# Patient Record
Sex: FEMALE | Race: WHITE | NOT HISPANIC OR LATINO | Employment: FULL TIME | URBAN - METROPOLITAN AREA
[De-identification: names, ages, dates, MRNs, and addresses within clinical notes are randomized per-mention and may not be internally consistent; named-entity substitution may affect disease eponyms.]

---

## 2017-01-17 ENCOUNTER — MYC MEDICAL ADVICE (OUTPATIENT)
Dept: FAMILY MEDICINE | Facility: CLINIC | Age: 47
End: 2017-01-17

## 2017-01-19 ENCOUNTER — OFFICE VISIT (OUTPATIENT)
Dept: FAMILY MEDICINE | Facility: CLINIC | Age: 47
End: 2017-01-19
Payer: COMMERCIAL

## 2017-01-19 ENCOUNTER — RADIANT APPOINTMENT (OUTPATIENT)
Dept: GENERAL RADIOLOGY | Facility: CLINIC | Age: 47
End: 2017-01-19
Attending: FAMILY MEDICINE
Payer: COMMERCIAL

## 2017-01-19 VITALS
HEIGHT: 64 IN | SYSTOLIC BLOOD PRESSURE: 107 MMHG | OXYGEN SATURATION: 97 % | WEIGHT: 226 LBS | DIASTOLIC BLOOD PRESSURE: 68 MMHG | TEMPERATURE: 97 F | HEART RATE: 94 BPM | BODY MASS INDEX: 38.58 KG/M2

## 2017-01-19 DIAGNOSIS — M25.562 LEFT KNEE PAIN, UNSPECIFIED CHRONICITY: ICD-10-CM

## 2017-01-19 DIAGNOSIS — M25.562 LEFT KNEE PAIN, UNSPECIFIED CHRONICITY: Primary | ICD-10-CM

## 2017-01-19 PROCEDURE — 73562 X-RAY EXAM OF KNEE 3: CPT | Mod: LT

## 2017-01-19 PROCEDURE — 99214 OFFICE O/P EST MOD 30 MIN: CPT | Performed by: FAMILY MEDICINE

## 2017-01-19 ASSESSMENT — PAIN SCALES - GENERAL: PAINLEVEL: SEVERE PAIN (7)

## 2017-01-19 NOTE — MR AVS SNAPSHOT
After Visit Summary   1/19/2017    Siri Vyas    MRN: [id number]           Patient Information     Date Of Birth          1970        Visit Information        Provider Department      1/19/2017 3:20 PM Chapito Coleman MD [facility]        Today's Diagnoses     Left knee pain, unspecified chronicity    -  1       Care Instructions        ================================================================================  Normal Values   Blood pressure  <140/90 for most adults    <130/80 for some chronic diseases (ask your care team about yours)    BMI (body mass index)  18.5-25 kg/m2 (based on height and weight)     Thank you for visiting [facility]    Normal or non-critical lab and imaging results will be communicated to you by MyChart, letter or phone within 7 days.  If you do not hear from us within 10 days, please call the clinic. If you have a critical or abnormal lab result, we will notify you by phone as soon as possible.     If you have any questions regarding your visit please contact:     Team Comfort:   Clinic Hours Telephone Number   Dr. Benito Perez 7am-5pm  Monday - Friday [phone]  Jodee Hung RN   Pharmacy 8:00am-8pm Monday-Friday    9am-5pm Saturday-Sunday [phone]   Urgent Care 11am-9pm Monday-Friday        9am-5pm Saturday-Sunday [phone]     After hours, weekend or if you need to make an appointment with your primary provider please call [phone].   After Hours nurse advise: call [facility]: [phone]    Medication Refills:  Call your pharmacy and they will forward the refill to us. Please allow 3 business days for your refills to be completed.                Follow-ups after your visit        Your next 10 appointments already scheduled     Jan 19, 2017  3:35 PM   XR KNEE LEFT 3 VIEWS with [id number]   [facility] ([facility])    [address] [place] [id number] [phone]           Please bring a list of your current medicines to your exam. (Include vitamins, minerals and over-thecounter medicines.) Leave your valuables at home.  Tell your doctor if there is a chance you may be pregnant.  You do not need to do anything special for this exam.              Future tests that were ordered for you today     Open Future Orders        Priority Expected Expires Ordered    MR Knee Left w/o Contrast Routine  1/19/2018 1/19/2017            Who to contact     If you have questions or need follow up information about today's clinic visit or your schedule please contact [facility] directly at [phone].  Normal or non-critical lab and imaging results will be communicated to you by MyChart, letter or phone within 4 business days after the clinic has received the results. If you do not hear from us within 7 days, please contact the clinic through [facility] or phone. If you have a critical or abnormal lab result, we will notify you by phone as soon as possible.  Submit refill requests through wali or call your pharmacy and they will forward the refill request to us. Please allow 3 business days for your refill to be completed.          Additional Information About Your Visit        [facility] Information     wali gives you secure access to your electronic health record. If you see a primary care provider, you can also send messages to your care team and make appointments. If you have questions, please call your primary care clinic.  If you do not have a primary care provider, please call [phone] and they will assist you.        Care EveryWhere ID     This is your Care EveryWhere ID. This could be used by other organizations to access your [facility] medical records  [id number]        Your Vitals Were     Pulse Temperature Height BMI (Body Mass Index) Pulse Oximetry  "Breastfeeding?    94 97  F (36.1  C) (Oral) 5' 3.5\" (1.613 m) 39.40 kg/m2 97% No       Blood Pressure from Last 3 Encounters:   01/19/17 107/68   09/15/16 112/74   03/29/16 105/70    Weight from Last 3 Encounters:   01/19/17 226 lb (102.513 kg)   09/15/16 212 lb 14.4 oz (96.571 kg)   03/29/16 203 lb 3.2 oz (92.171 kg)               Primary Care Provider Office Phone # Fax #    Chapito Coleman [phone]       [facility] [address]  [facility] [address]        Thank you!     Thank you for choosing [facility]  for your care. Our goal is always to provide you with excellent care. Hearing back from our patients is one way we can continue to improve our services. Please take a few minutes to complete the written survey that you may receive in the mail after your visit with us. Thank you!             Your Updated Medication List - Protect others around you: Learn how to safely use, store and throw away your medicines at www.disposemymeds.org.          This list is accurate as of: 1/19/17  3:32 PM.  Always use your most recent med list.                   Brand Name Dispense Instructions for use    fexofenadine 180 MG tablet    ALLEGRA    90 tablet    Take  by mouth. 1 TABLET DAILY FOR ALLERGIES       fluticasone 50 MCG/ACT spray    FLONASE    1 Package    Spray 2 sprays into both nostrils daily       MULTIVITAMINS PO          OMEGA-3 FISH OIL PO          SUMAtriptan 25 MG tablet    IMITREX    9 tablet    Take 1-2 tablets (25-50 mg) by mouth at onset of headache for migraine May repeat dose in 2 hours.  Do not exceed 200 mg in 24 hours       TYLENOL 325 MG tablet   Generic drug:  acetaminophen      Take 325-650 mg by mouth every 6 hours as needed         "

## 2017-01-19 NOTE — PATIENT INSTRUCTIONS
================================================================================  Normal Values   Blood pressure  <140/90 for most adults    <130/80 for some chronic diseases (ask your care team about yours)    BMI (body mass index)  18.5-25 kg/m2 (based on height and weight)     Thank you for visiting [facility]    Normal or non-critical lab and imaging results will be communicated to you by MyChart, letter or phone within 7 days.  If you do not hear from us within 10 days, please call the clinic. If you have a critical or abnormal lab result, we will notify you by phone as soon as possible.     If you have any questions regarding your visit please contact:     Team Comfort:   Clinic Hours Telephone Number   Dr. Benito Perez 7am-5pm  Monday - Friday [phone]  Jodee Hung RN   Pharmacy 8:00am-8pm Monday-Friday    9am-5pm Saturday-Sunday [phone]   Urgent Care 11am-9pm Monday-Friday        9am-5pm Saturday-Sunday [phone]     After hours, weekend or if you need to make an appointment with your primary provider please call [phone].   After Hours nurse advise: call [facility]: [phone]    Medication Refills:  Call your pharmacy and they will forward the refill to us. Please allow 3 business days for your refills to be completed.

## 2017-01-19 NOTE — PROGRESS NOTES
"  SUBJECTIVE:                                                    Siri Vyas is a 46 year old female who presents to clinic today for the following health issues:      Joint Pain     Onset: about 2 weeks     Description:   Location: left knee  Character: Sharp, Dull ache, Stabbing and Burning    Intensity: 7/10    Progression of Symptoms: worse    Accompanying Signs & Symptoms:  Other symptoms: swelling   History:   Previous similar pain: no       Precipitating factors:   Trauma or overuse: no     Alleviating factors:  Improved by: nothing       Therapies Tried and outcome: none      Problem list and histories reviewed & adjusted, as indicated.  Additional history: as documented    Problem list, Medication list, Allergies, and Medical/Social/Surgical histories reviewed in EPIC and updated as appropriate.    ROS:  Constitutional, HEENT, cardiovascular, pulmonary, GI, , musculoskeletal, neuro, skin, endocrine and psych systems are negative, except as otherwise noted.    OBJECTIVE:                                                    /68 mmHg  Pulse 94  Temp(Src) 97  F (36.1  C) (Oral)  Ht 5' 3.5\" (1.613 m)  Wt 226 lb (102.513 kg)  BMI 39.40 kg/m2  SpO2 97%  Breastfeeding? No  Body mass index is 39.4 kg/(m^2).  GENERAL: healthy, alert and no distress  NECK: no adenopathy, no asymmetry, masses, or scars and thyroid normal to palpation  RESP: lungs clear to auscultation - no rales, rhonchi or wheezes  CV: regular rate and rhythm, normal S1 S2, no S3 or S4, no murmur, click or rub, no peripheral edema and peripheral pulses strong  ABDOMEN: soft, nontender, no hepatosplenomegaly, no masses and bowel sounds normal  MS: medial joint line pain and posterior pain with Sherri  Diagnostic Test Results:  none      ASSESSMENT/PLAN:                                                      (M25.562) Left knee pain, unspecified chronicity  (primary encounter diagnosis)  Comment: likely cartilage injury  Plan: XR Knee " Left 3 Views, MR Knee Left w/o Contrast        Will await MRI scan results. May take ibuprofen as needed. Ice as needed.      See Patient Instructions    Chapito Coleman MD, MD  [facility]

## 2017-01-19 NOTE — NURSING NOTE
"Chief Complaint   Patient presents with     Knee Pain     Left       Initial /68 mmHg  Pulse 94  Temp(Src) 97  F (36.1  C) (Oral)  Ht 5' 3.5\" (1.613 m)  Wt 226 lb (102.513 kg)  BMI 39.40 kg/m2  SpO2 97%  Breastfeeding? No Estimated body mass index is 39.4 kg/(m^2) as calculated from the following:    Height as of this encounter: 5' 3.5\" (1.613 m).    Weight as of this encounter: 226 lb (102.513 kg).  BP completed using cuff size: suhail Vaughan MA      "

## 2017-01-21 ENCOUNTER — RADIANT APPOINTMENT (OUTPATIENT)
Dept: MRI IMAGING | Facility: CLINIC | Age: 47
End: 2017-01-21
Attending: FAMILY MEDICINE
Payer: COMMERCIAL

## 2017-01-21 DIAGNOSIS — M25.562 LEFT KNEE PAIN, UNSPECIFIED CHRONICITY: ICD-10-CM

## 2017-01-21 PROCEDURE — 73721 MRI JNT OF LWR EXTRE W/O DYE: CPT | Mod: TC

## 2017-01-23 ENCOUNTER — TELEPHONE (OUTPATIENT)
Dept: FAMILY MEDICINE | Facility: CLINIC | Age: 47
End: 2017-01-23

## 2017-01-23 NOTE — TELEPHONE ENCOUNTER
Notes Recorded by Chapito Coleman MD on 1/23/2017 at 1:06 PM  Please notify patient that there is a cartilage defect of the left knee that is likely causing her symptoms. Patient referred to Orthopedics for further evaluation and recommendations. Patient can call [phone] to schedule appointment.    Chapito Coleman MD    This writer attempted to contact Siri on 01/23/2017.    Was call answered?  No.  Left message on voicemail with information to call me back.    If patient calls back, please Contact Clinic RN team. If no one available, send encounter message    Anabell Claros

## 2017-01-24 ENCOUNTER — OFFICE VISIT (OUTPATIENT)
Dept: ORTHOPEDICS | Facility: CLINIC | Age: 47
End: 2017-01-24
Payer: COMMERCIAL

## 2017-01-24 VITALS — WEIGHT: 227 LBS | RESPIRATION RATE: 14 BRPM | BODY MASS INDEX: 37.82 KG/M2 | HEIGHT: 65 IN

## 2017-01-24 DIAGNOSIS — M94.262 CHONDROMALACIA, KNEE, LEFT: Primary | ICD-10-CM

## 2017-01-24 PROCEDURE — 99243 OFF/OP CNSLTJ NEW/EST LOW 30: CPT | Performed by: ORTHOPAEDIC SURGERY

## 2017-01-24 NOTE — MR AVS SNAPSHOT
After Visit Summary   1/24/2017    Siri Vyas    MRN: [id number]           Patient Information     Date Of Birth          1970        Visit Information        Provider Department      1/24/2017 9:15 AM Hernesto Cunningham MD [facility] Instructions    Diagnosis  Chondromalacia patella.  Glucosamine 1500 mg/day and chondroitin sulfate 1200 mg/day advised.  Avoid hopping.  Do quadriceps strengthening (especially VMO) .  Do hip abduction strengthening.  Use anti-inflammatories as needed.  Aleve up to 4 tablets per day or ibuprofen up to 12 tablets per day.                      Patellofemoral Pain Syndrome (Runner's Knee)             What is patellofemoral pain syndrome?   Patellofemoral pain syndrome is pain behind the kneecap. It may also be called patellofemoral disorder, patellar malalignment, runner's knee, and chondromalacia.   How does it occur?   Patellofemoral pain syndrome can occur from overuse of the knee in sports and activities such as running, walking, jumping, or bicycling.   The kneecap (patella) is attached to the large group of muscles in the thigh called the quadriceps. It is also attached to the shin bone by the patellar tendon. The kneecap fits into grooves in the end of the thigh bone (femur) called the femoral condyle. With repeated bending and straightening of the knee, you can irritate the inside surface of the kneecap and cause pain.   Patellofemoral pain syndrome also may result from the way your hips, legs, knees, or feet are aligned. For example, if you have wide hips or underdeveloped thigh muscles, or if you are knock-kneed You may also have this problem if your foot flattens too much when you walk or run (a condition called over-pronation).   What are the symptoms?   The main symptom is pain behind the kneecap. You may have pain when you walk, run, or sit for a long time. The pain is usually worse when you walk downhill or down  stairs. Your knee may swell at times. You may feel or hear snapping, popping, or grinding in the knee.   How is it diagnosed?   Your healthcare provider will review your symptoms and examine your knee. You will have knee X-rays. You may have an MRI to check for damage to the surface of the patella or femur or another injury.   How is it treated?   Treatment includes the following:   Put an ice pack, gel pack, or package of frozen vegetables, wrapped in a cloth on the area every 3 to 4 hours, for up to 20 minutes at a time.   Raise the knee on a pillow when you sit or lie down.   Take an anti-inflammatory medicine such as ibuprofen, or other medicine as directed by your provider. Nonsteroidal anti-inflammatory medicines (NSAIDs) may cause stomach bleeding and other problems. These risks increase with age. Read the label and take as directed. Unless recommended by your healthcare provider, do not take for more than 10 days.   Follow your provider's instructions for doing exercises to help you recover. Your healthcare provider will show you exercises to help decrease the pain behind your kneecap.   If you over-pronate, your healthcare provider may recommend shoe inserts, called orthotics. You can buy orthotics at a pharmacy or athletic shoe store or they can be custom-made.   Use an infrapatellar strap, a strap placed below the kneecap over the patellar tendon.   Wear a neoprene knee sleeve, which will give support to your knee and patella.   While you recover from your injury, you will need to change your sport or activity to one that does not make your condition worse. For example, you may need to bicycle or swim instead of run.   In cases of severe patellofemoral pain syndrome, surgery may be recommended.   How long will the effects last?   Patellofemoral pain often lasts a long time and can come back after symptoms were better for a while. Treatment requires proper rehabilitation exercises that are done regularly.    When can I return to my normal activities?   Everyone recovers from an injury at a different rate. Return to your activities depends on how soon your knee recovers, not by how many days or weeks it has been since your injury has occurred. In general, the longer you have symptoms before you start treatment, the longer it will take to get better. The goal is to return you to your normal activities as soon as is safely possible. If you return too soon you may worsen your injury.   You may safely return to your normal activities when, starting from the top of the list and progressing to the end, each of the following is true:   Your injured knee can be fully straightened and bent without pain.   Your knee and leg have regained normal strength compared to the uninjured knee and leg.   You are able to walk, bend, and squat without pain.   How can I prevent runner's knee?   Runner's knee can best be prevented by strengthening your thigh muscles, particularly the inside part of this muscle group. It is also important to wear shoes that fit well and that have good arch supports.     Published by [employer].  This content is reviewed periodically and is subject to change as new health information becomes available. The information is intended to inform and educate and is not a replacement for medical evaluation, advice, diagnosis or treatment by a healthcare professional.   Written by Rey Pinzon MD, for [employer]   ? 2010 [employer] and/or its affiliates. All Rights Reserved.   Copyright   [employer] 2011  Adult Health Advisor    Patellofemoral Pain Syndrome (Runner's Knee) Rehabilitation Exercises              You can do the hamstring stretch right away. When the pain in your knee has decreased, you can do the quadriceps stretch and start strengthening the thigh muscles using the rest of the exercises.   Standing hamstring stretch: Put the heel of the leg on your injured side on a stool about 15  inches high. Keep your leg straight. Lean forward, bending at the hips, until you feel a mild stretch in the back of your thigh. Make sure you don't roll your shoulders or bend at the waist when doing this or you will stretch your lower back instead of your leg. Hold the stretch for 15 to 30 seconds. Repeat 3 times.   Quadriceps stretch: Stand an arm's length away from the wall with your injured leg farthest from the wall. Facing straight ahead, brace yourself by keeping one hand against the wall. With your other hand, grasp the ankle of your injured leg and pull your heel toward your buttocks. Don't arch or twist your back. Keep your knees together. Hold this stretch for 15 to 30 seconds.   Side-lying leg lift: Lie on your uninjured side. Tighten the front thigh muscles on your injured leg and lift that leg 8 to 10 inches away from the other leg. Keep the leg straight and lower it slowly. Do 3 sets of 10.   Quad sets: Sit on the floor with your injured leg straight and your other leg bent. Press the back of the knee of your injured leg against the floor by tightening the muscles on the top of your thigh. Hold this position 10 seconds. Relax. Do 3 sets of 10.   Straight leg raise: Lie on your back with your legs straight out in front of you. Bend the knee on your uninjured side and place the foot flat on the floor. Tighten the thigh muscle on your injured side and lift your leg about 8 inches off the floor. Keep your leg straight and your thigh muscle tight. Slowly lower your leg back down to the floor. Do 3 sets of 10.   Step-up: Stand with the foot of your injured leg on a support 3 to 5 inches high (like a small step or block of wood). Keep your other foot flat on the floor. Shift your weight onto the injured leg on the support. Straighten your injured leg as the other leg comes off the floor. Return to the starting position by bending your injured leg and slowly lowering your uninjured leg back to the floor. Do  3 sets of 10.   Wall squat with a ball: Stand with your back, shoulders, and head against a wall. Look straight ahead. Keep your shoulders relaxed and your feet 3 feet from the wall and shoulder's width apart. Place a soccer or basketball-sized ball behind your back. Keeping your back against the wall, slowly squat down to a 45-degree angle. Your thighs will not yet be parallel to the floor. Hold this position for 10 seconds and then slowly slide back up the wall. Repeat 10 times. Build up to 3 sets of 10.   Knee stabilization: Wrap a piece of elastic tubing around the ankle of the uninjured leg. Tie a knot in the other end of the tubing and close it in a door.   0. Stand facing the door on the leg without tubing and bend your knee slightly, keeping your thigh muscles tight. While maintaining this position, move the leg with the tubing straight back behind you. Do 3 sets of 10.   0. Turn 90 degrees so the leg without tubing is closest to the door. Move the leg with tubing away from your body. Do 3 sets of 10.   0. Turn 90 degrees again so your back is to the door. Move the leg with tubing straight out in front of you. Do 3 sets of 10.   0. Turn your body 90 degrees again so the leg with tubing is closest to the door. Move the leg with tubing across your body. Do 3 sets of 10.   Hold onto a chair if you need help balancing. This exercise can be made even more challenging by standing on a pillow while you move the leg with tubing.   Resisted terminal knee extension: Make a loop from a piece of elastic tubing by tying a knot in both ends. Close both knots in a door. Step into the loop so the tubing is around the back of your injured leg. Lift the other foot off the ground. Hold onto a chair for balance, if needed. Bend the knee on the leg with tubing about 45 degrees. Slowly straighten your leg, keeping your thigh muscle tight as you do this. Do this 10 times. Do 3 sets. An easier way to do this is to stand on both  legs for better support while you do the exercise.   Standing calf stretch: Stand facing a wall with your hands on the wall at about eye level. Keep your injured leg back with your heel on the floor. Keep the other leg forward with the knee bent. Turn your back foot slightly inward (as if you were pigeon-toed). Slowly lean into the wall until you feel a stretch in the back of your calf. Hold the stretch for 15 to 30 seconds. Return to the starting position. Repeat 3 times. Do this exercise several times each day.   Clam exercise: Lie on your uninjured side with your hips and knees bent and feet together. Slowly raise your top leg toward the ceiling while keeping your heels touching each other. Hold for 2 seconds and lower slowly. Do 3 sets of 10 repetitions.   Iliotibial band stretch: Side-bending: Cross one leg in front of the other leg and lean in the opposite direction from the front leg. Reach the arm on the side of the back leg over your head while you do this. Hold this position for 15 to 30 seconds. Return to the starting position. Repeat 3 times and then switch legs and repeat the exercise.   Published by [employer].  This content is reviewed periodically and is subject to change as new health information becomes available. The information is intended to inform and educate and is not a replacement for medical evaluation, advice, diagnosis or treatment by a healthcare professional.   Written by Anu Busby MS, PT, and Candace Lopez, PT, [facility], [facility], for [employer].   ? 2010 [facility] and/or its affiliates. All Rights Reserved.   Copyright   [employer] 2011  Adult Health Advisor                                                    Follow-ups after your visit        Who to contact     If you have questions or need follow up information about today's clinic visit or your schedule please contact [facility] directly at [phone].  Normal or non-critical lab and imaging  "results will be communicated to you by MyChart, letter or phone within 4 business days after the clinic has received the results. If you do not hear from us within 7 days, please contact the clinic through [employer] or phone. If you have a critical or abnormal lab result, we will notify you by phone as soon as possible.  Submit refill requests through [employer] or call your pharmacy and they will forward the refill request to us. Please allow 3 business days for your refill to be completed.          Additional Information About Your Visit        [employer] Information     [employer] gives you secure access to your electronic health record. If you see a primary care provider, you can also send messages to your care team and make appointments. If you have questions, please call your primary care clinic.  If you do not have a primary care provider, please call [phone] and they will assist you.        Care EveryWhere ID     This is your Care EveryWhere ID. This could be used by other organizations to access your [place] medical records  [id number]        Your Vitals Were     Respirations Height BMI (Body Mass Index)             14 1.638 m (5' 4.5\") 38.38 kg/m2          Blood Pressure from Last 3 Encounters:   01/19/17 107/68   09/15/16 112/74   03/29/16 105/70    Weight from Last 3 Encounters:   01/24/17 102.967 kg (227 lb)   01/19/17 102.513 kg (226 lb)   09/15/16 96.571 kg (212 lb 14.4 oz)              Today, you had the following     No orders found for display       Primary Care Provider Office Phone # Fax #    Chapito Coleman [phone]       [facility] [address]  [facility] [address]        Thank you!     Thank you for choosing [facility]  for your care. Our goal is always to provide you with excellent care. Hearing back from our patients is one way we can continue to improve our services. Please take a few minutes to complete the written survey that you may " receive in the mail after your visit with us. Thank you!             Your Updated Medication List - Protect others around you: Learn how to safely use, store and throw away your medicines at www.disposemymeds.org.          This list is accurate as of: 1/24/17  9:48 AM.  Always use your most recent med list.                   Brand Name Dispense Instructions for use    fexofenadine 180 MG tablet    ALLEGRA    90 tablet    Take  by mouth. 1 TABLET DAILY FOR ALLERGIES       fluticasone 50 MCG/ACT spray    FLONASE    1 Package    Spray 2 sprays into both nostrils daily       MULTIVITAMINS PO          OMEGA-3 FISH OIL PO          SUMAtriptan 25 MG tablet    IMITREX    9 tablet    Take 1-2 tablets (25-50 mg) by mouth at onset of headache for migraine May repeat dose in 2 hours.  Do not exceed 200 mg in 24 hours       TYLENOL 325 MG tablet   Generic drug:  acetaminophen      Take 325-650 mg by mouth every 6 hours as needed

## 2017-01-24 NOTE — PATIENT INSTRUCTIONS
Diagnosis  Chondromalacia patella.  Glucosamine 1500 mg/day and chondroitin sulfate 1200 mg/day advised.  Avoid hopping.  Do quadriceps strengthening (especially VMO) .  Do hip abduction strengthening.  Use anti-inflammatories as needed.  Aleve up to 4 tablets per day or ibuprofen up to 12 tablets per day.                      Patellofemoral Pain Syndrome (Runner's Knee)             What is patellofemoral pain syndrome?   Patellofemoral pain syndrome is pain behind the kneecap. It may also be called patellofemoral disorder, patellar malalignment, runner's knee, and chondromalacia.   How does it occur?   Patellofemoral pain syndrome can occur from overuse of the knee in sports and activities such as running, walking, jumping, or bicycling.   The kneecap (patella) is attached to the large group of muscles in the thigh called the quadriceps. It is also attached to the shin bone by the patellar tendon. The kneecap fits into grooves in the end of the thigh bone (femur) called the femoral condyle. With repeated bending and straightening of the knee, you can irritate the inside surface of the kneecap and cause pain.   Patellofemoral pain syndrome also may result from the way your hips, legs, knees, or feet are aligned. For example, if you have wide hips or underdeveloped thigh muscles, or if you are knock-kneed You may also have this problem if your foot flattens too much when you walk or run (a condition called over-pronation).   What are the symptoms?   The main symptom is pain behind the kneecap. You may have pain when you walk, run, or sit for a long time. The pain is usually worse when you walk downhill or down stairs. Your knee may swell at times. You may feel or hear snapping, popping, or grinding in the knee.   How is it diagnosed?   Your healthcare provider will review your symptoms and examine your knee. You will have knee X-rays. You may have an MRI to check for damage to the surface of the patella or femur or  another injury.   How is it treated?   Treatment includes the following:   Put an ice pack, gel pack, or package of frozen vegetables, wrapped in a cloth on the area every 3 to 4 hours, for up to 20 minutes at a time.   Raise the knee on a pillow when you sit or lie down.   Take an anti-inflammatory medicine such as ibuprofen, or other medicine as directed by your provider. Nonsteroidal anti-inflammatory medicines (NSAIDs) may cause stomach bleeding and other problems. These risks increase with age. Read the label and take as directed. Unless recommended by your healthcare provider, do not take for more than 10 days.   Follow your provider's instructions for doing exercises to help you recover. Your healthcare provider will show you exercises to help decrease the pain behind your kneecap.   If you over-pronate, your healthcare provider may recommend shoe inserts, called orthotics. You can buy orthotics at a pharmacy or athletic shoe store or they can be custom-made.   Use an infrapatellar strap, a strap placed below the kneecap over the patellar tendon.   Wear a neoprene knee sleeve, which will give support to your knee and patella.   While you recover from your injury, you will need to change your sport or activity to one that does not make your condition worse. For example, you may need to bicycle or swim instead of run.   In cases of severe patellofemoral pain syndrome, surgery may be recommended.   How long will the effects last?   Patellofemoral pain often lasts a long time and can come back after symptoms were better for a while. Treatment requires proper rehabilitation exercises that are done regularly.   When can I return to my normal activities?   Everyone recovers from an injury at a different rate. Return to your activities depends on how soon your knee recovers, not by how many days or weeks it has been since your injury has occurred. In general, the longer you have symptoms before you start treatment,  the longer it will take to get better. The goal is to return you to your normal activities as soon as is safely possible. If you return too soon you may worsen your injury.   You may safely return to your normal activities when, starting from the top of the list and progressing to the end, each of the following is true:   Your injured knee can be fully straightened and bent without pain.   Your knee and leg have regained normal strength compared to the uninjured knee and leg.   You are able to walk, bend, and squat without pain.   How can I prevent runner's knee?   Runner's knee can best be prevented by strengthening your thigh muscles, particularly the inside part of this muscle group. It is also important to wear shoes that fit well and that have good arch supports.     Published by [employer].  This content is reviewed periodically and is subject to change as new health information becomes available. The information is intended to inform and educate and is not a replacement for medical evaluation, advice, diagnosis or treatment by a healthcare professional.   Written by Rey Pinzon MD, for [employer]   ? 2010 [employer] and/or its affiliates. All Rights Reserved.   Copyright   [employer] 2011  Adult Health Advisor    Patellofemoral Pain Syndrome (Runner's Knee) Rehabilitation Exercises              You can do the hamstring stretch right away. When the pain in your knee has decreased, you can do the quadriceps stretch and start strengthening the thigh muscles using the rest of the exercises.   Standing hamstring stretch: Put the heel of the leg on your injured side on a stool about 15 inches high. Keep your leg straight. Lean forward, bending at the hips, until you feel a mild stretch in the back of your thigh. Make sure you don't roll your shoulders or bend at the waist when doing this or you will stretch your lower back instead of your leg. Hold the stretch for 15 to 30 seconds. Repeat 3  times.   Quadriceps stretch: Stand an arm's length away from the wall with your injured leg farthest from the wall. Facing straight ahead, brace yourself by keeping one hand against the wall. With your other hand, grasp the ankle of your injured leg and pull your heel toward your buttocks. Don't arch or twist your back. Keep your knees together. Hold this stretch for 15 to 30 seconds.   Side-lying leg lift: Lie on your uninjured side. Tighten the front thigh muscles on your injured leg and lift that leg 8 to 10 inches away from the other leg. Keep the leg straight and lower it slowly. Do 3 sets of 10.   Quad sets: Sit on the floor with your injured leg straight and your other leg bent. Press the back of the knee of your injured leg against the floor by tightening the muscles on the top of your thigh. Hold this position 10 seconds. Relax. Do 3 sets of 10.   Straight leg raise: Lie on your back with your legs straight out in front of you. Bend the knee on your uninjured side and place the foot flat on the floor. Tighten the thigh muscle on your injured side and lift your leg about 8 inches off the floor. Keep your leg straight and your thigh muscle tight. Slowly lower your leg back down to the floor. Do 3 sets of 10.   Step-up: Stand with the foot of your injured leg on a support 3 to 5 inches high (like a small step or block of wood). Keep your other foot flat on the floor. Shift your weight onto the injured leg on the support. Straighten your injured leg as the other leg comes off the floor. Return to the starting position by bending your injured leg and slowly lowering your uninjured leg back to the floor. Do 3 sets of 10.   Wall squat with a ball: Stand with your back, shoulders, and head against a wall. Look straight ahead. Keep your shoulders relaxed and your feet 3 feet from the wall and shoulder's width apart. Place a soccer or basketball-sized ball behind your back. Keeping your back against the wall, slowly  squat down to a 45-degree angle. Your thighs will not yet be parallel to the floor. Hold this position for 10 seconds and then slowly slide back up the wall. Repeat 10 times. Build up to 3 sets of 10.   Knee stabilization: Wrap a piece of elastic tubing around the ankle of the uninjured leg. Tie a knot in the other end of the tubing and close it in a door.   0. Stand facing the door on the leg without tubing and bend your knee slightly, keeping your thigh muscles tight. While maintaining this position, move the leg with the tubing straight back behind you. Do 3 sets of 10.   0. Turn 90 degrees so the leg without tubing is closest to the door. Move the leg with tubing away from your body. Do 3 sets of 10.   0. Turn 90 degrees again so your back is to the door. Move the leg with tubing straight out in front of you. Do 3 sets of 10.   0. Turn your body 90 degrees again so the leg with tubing is closest to the door. Move the leg with tubing across your body. Do 3 sets of 10.   Hold onto a chair if you need help balancing. This exercise can be made even more challenging by standing on a pillow while you move the leg with tubing.   Resisted terminal knee extension: Make a loop from a piece of elastic tubing by tying a knot in both ends. Close both knots in a door. Step into the loop so the tubing is around the back of your injured leg. Lift the other foot off the ground. Hold onto a chair for balance, if needed. Bend the knee on the leg with tubing about 45 degrees. Slowly straighten your leg, keeping your thigh muscle tight as you do this. Do this 10 times. Do 3 sets. An easier way to do this is to stand on both legs for better support while you do the exercise.   Standing calf stretch: Stand facing a wall with your hands on the wall at about eye level. Keep your injured leg back with your heel on the floor. Keep the other leg forward with the knee bent. Turn your back foot slightly inward (as if you were pigeon-toed).  Slowly lean into the wall until you feel a stretch in the back of your calf. Hold the stretch for 15 to 30 seconds. Return to the starting position. Repeat 3 times. Do this exercise several times each day.   Clam exercise: Lie on your uninjured side with your hips and knees bent and feet together. Slowly raise your top leg toward the ceiling while keeping your heels touching each other. Hold for 2 seconds and lower slowly. Do 3 sets of 10 repetitions.   Iliotibial band stretch: Side-bending: Cross one leg in front of the other leg and lean in the opposite direction from the front leg. Reach the arm on the side of the back leg over your head while you do this. Hold this position for 15 to 30 seconds. Return to the starting position. Repeat 3 times and then switch legs and repeat the exercise.   Published by [employer].  This content is reviewed periodically and is subject to change as new health information becomes available. The information is intended to inform and educate and is not a replacement for medical evaluation, advice, diagnosis or treatment by a healthcare professional.   Written by Anu Busby, MS, PT, and Candace Lopez PT, [facility], [facility], for [employer].   ? 2010 [facility] and/or its affiliates. All Rights Reserved.   Copyright   [employer] Reference Systems 2011  Adult Health Advisor

## 2017-01-24 NOTE — NURSING NOTE
"Chief Complaint   Patient presents with     Consult     Left knee pain, referred by Dr. Coleman. Xray left knee 1/19/17, MRI 1/24/17.        Initial Resp 14  Ht 1.638 m (5' 4.5\")  Wt 102.967 kg (227 lb)  BMI 38.38 kg/m2 Estimated body mass index is 38.38 kg/(m^2) as calculated from the following:    Height as of this encounter: 1.638 m (5' 4.5\").    Weight as of this encounter: 102.967 kg (227 lb).  BP completed using cuff size: NA (Not Taken)    Chago Siegel PA-C  Supervising physician: Hernesto Cunningham MD  Dept. of Orthopedics  [facility]          "

## 2017-01-26 PROBLEM — M94.262 CHONDROMALACIA, KNEE, LEFT: Status: ACTIVE | Noted: 2017-01-26

## 2017-01-26 NOTE — PROGRESS NOTES
Siri Vyas is a 46 year old female who is seen in consultation at the request of Dr. Chapito Coleman  for left knee pain.    Past Medical History   Diagnosis Date     Anxiety disorder      High cholesterol 10/2005     Depression with anxiety 10/2006     PE (pulmonary embolism) 2007     AR (allergic rhinitis)        Past Surgical History   Procedure Laterality Date     Tonsillectomy  1999     Laparoscopic cholecystectomy       Sinus surgery  2002     Nasls sinuses     Gyn surgery  ,,,           Dermoid cyst  (ovary) removal         Family History   Problem Relation Age of Onset     Breast Cancer Mother      Lipids Mother      Hypertension Mother      Lipids Brother      CEREBROVASCULAR DISEASE Maternal Grandmother 76     Neurologic Disorder Son 14     migraines     Hearing Loss Mother        Social History     Social History     Marital Status:      Spouse Name: Jose Armando     Number of Children: 4     Years of Education: 16     Occupational History     [employer]     Social History Main Topics     Smoking status: Never Smoker      Smokeless tobacco: Never Used      Comment: no second hand smoke     Alcohol Use: Yes      Comment: 1-2 days in a week. Each time 1-2 beers. 0-5 beers weeks     Drug Use: No     Sexual Activity:     Partners: Male     Other Topics Concern     Not on file     Social History Narrative       Current Outpatient Prescriptions   Medication Sig Dispense Refill     Omega-3 Fatty Acids (OMEGA-3 FISH OIL PO)        Multiple Vitamin (MULTIVITAMINS PO)        SUMAtriptan (IMITREX) 25 MG tablet Take 1-2 tablets (25-50 mg) by mouth at onset of headache for migraine May repeat dose in 2 hours.  Do not exceed 200 mg in 24 hours 9 tablet 5     fluticasone (FLONASE) 50 MCG/ACT nasal spray Spray 2 sprays into both nostrils daily 1 Package 1     acetaminophen (TYLENOL) 325 MG tablet Take 325-650 mg by mouth every 6 hours as needed        "fexofenadine (ALLEGRA) 180 MG tablet Take  by mouth. 1 TABLET DAILY FOR ALLERGIES 90 tablet 0       Allergies   Allergen Reactions     No Known Drug Allergy      Seasonal Allergies        REVIEW OF SYSTEMS:  CONSTITUTIONAL:  NEGATIVE for fever, chills, change in weight, not feeling tired  SKIN:  NEGATIVE for worrisome rashes, no skin lumps, no skin ulcers and no non-healing wounds  EYES:  NEGATIVE for vision changes or irritation.  ENT/MOUTH:  NEGATIVE.  No hearing loss, no hoarseness, no difficulty swallowing.  RESP:  NEGATIVE. No cough or shortness of breath.  CV:  NEGATIVE for chest pain, palpitations or peripheral edema  GI:  NEGATIVE for nausea, abdominal pain, heartburn, or change in bowel habits  :  Negative. No dysuria, no hematuria  MUSCULOSKELETAL:  See HPI above  NEURO:  NEGATIVE . No headaches, no dizziness,  no numbness  ENDOCRINE:  NEGATIVE for temperature intolerance, skin/hair changes  HEME/ALLERGY/IMMUNE:  NEGATIVE for bleeding problems  PSYCHIATRIC:  NEGATIVE. no anxiety, no depression.      Exam:  Vitals: Resp 14  Ht 1.638 m (5' 4.5\")  Wt 102.967 kg (227 lb)  BMI 38.38 kg/m2  BMI= Body mass index is 38.38 kg/(m^2).  Constitutional:  healthy, alert and no distress  Neuro: Alert and Oriented x 3, Gait normal. Sensation grossly WNL.  HEENT:  Atraumatic, EOMI  Neck:  Neck supple with no tenderness.  Psych: Affect normal   Respiratory: Breathing not labored.  Cardiovascular: normal peripheral pulses  Lymph: no adenopathy  Skin: No rashes,worrisome lesions or skin problems  Spine: straight, no straight leg raising pain.  Hips show full range of motion.  There is no tenderness over the sacro-iliac joints, sciatic notch, or greater trochanters.     "

## 2017-01-26 NOTE — PROGRESS NOTES
DATE OF VISIT:  2017       HISTORY OF PRESENT ILLNESS:  Ms. Daiana Figueroa is a 46-year-old female referred from Dr. Chapito Coleman for evaluation of left knee pain.  This started in 2016 and has gotten progressively worse since then.  It bothers her with stairs, bending and running.  She has a clicking in the knee.  She has sharp, aching, shooting pain primarily in the anterior portion of the knee, rates this at 6/10.  She has had MRI scan showing cartilage thinning and a small 1 cm defect in the articular cartilage in the weightbearing surface of the medial femoral condyle.  Menisci were normal.      PHYSICAL EXAMINATION:  Shows a trace effusion of the left knee. She has full mobility of the knees.  She does have clicking under the patella on the left.  She has tenderness around the medial joint and anteromedial joint on the left.  There is also some tenderness laterally over the femur, possibly related to IT band.  She has no ligamentous laxity of MCL, LCL or cruciates.  She had anteromedial pain with lateral Sherri.  She had negative medial Sherri, indicating this is primarily inflammation.  Sensation and circulation were intact to the legs.  There is no increased warmth or erythema.      IMPRESSION:  Left knee chondromalacia.  We discussed options and will recommend glucosamine and chondroitin sulfate, avoid hopping or jumping.  Rhythmic activities were recommended Aleve up to 4 tablets per day, quad and hip abduction strengthening were described.  Return to clinic p.kim ISLAS MD             D: 2017 07:43   T: 2017 08:09   MT: mala      Name:     DAIANA FIGUEROA   MRN:      [id number]        Account:      [id number]   :      1970           Visit Date:   2017      Document: [id number]       cc: Chapito Coleman MD

## 2017-02-22 ENCOUNTER — OFFICE VISIT (OUTPATIENT)
Dept: FAMILY MEDICINE | Facility: CLINIC | Age: 47
End: 2017-02-22
Payer: COMMERCIAL

## 2017-02-22 VITALS
BODY MASS INDEX: 35.65 KG/M2 | WEIGHT: 214 LBS | HEIGHT: 65 IN | OXYGEN SATURATION: 94 % | TEMPERATURE: 99.5 F | DIASTOLIC BLOOD PRESSURE: 81 MMHG | SYSTOLIC BLOOD PRESSURE: 131 MMHG | HEART RATE: 110 BPM

## 2017-02-22 DIAGNOSIS — R07.0 THROAT PAIN: ICD-10-CM

## 2017-02-22 DIAGNOSIS — Z13.6 CARDIOVASCULAR SCREENING; LDL GOAL LESS THAN 160: ICD-10-CM

## 2017-02-22 DIAGNOSIS — E66.812 OBESITY, CLASS II, BMI 35-39.9: ICD-10-CM

## 2017-02-22 DIAGNOSIS — J06.9 VIRAL UPPER RESPIRATORY ILLNESS: Primary | ICD-10-CM

## 2017-02-22 LAB
DEPRECATED S PYO AG THROAT QL EIA: NORMAL
FLUAV+FLUBV AG SPEC QL: NEGATIVE
FLUAV+FLUBV AG SPEC QL: NORMAL
MICRO REPORT STATUS: NORMAL
SPECIMEN SOURCE: NORMAL
SPECIMEN SOURCE: NORMAL

## 2017-02-22 PROCEDURE — 99213 OFFICE O/P EST LOW 20 MIN: CPT | Performed by: NURSE PRACTITIONER

## 2017-02-22 PROCEDURE — 87081 CULTURE SCREEN ONLY: CPT | Performed by: NURSE PRACTITIONER

## 2017-02-22 PROCEDURE — 87804 INFLUENZA ASSAY W/OPTIC: CPT | Performed by: NURSE PRACTITIONER

## 2017-02-22 PROCEDURE — 87880 STREP A ASSAY W/OPTIC: CPT | Performed by: NURSE PRACTITIONER

## 2017-02-22 RX ORDER — OSELTAMIVIR PHOSPHATE 75 MG/1
75 CAPSULE ORAL 2 TIMES DAILY
Qty: 10 CAPSULE | Refills: 0 | Status: SHIPPED | OUTPATIENT
Start: 2017-02-22 | End: 2017-04-24

## 2017-02-22 NOTE — NURSING NOTE
"Chief Complaint   Patient presents with     URI       Initial /81  Pulse 110  Temp 99.5  F (37.5  C) (Oral)  Ht 5' 4.5\" (1.638 m)  Wt 214 lb (97.1 kg)  LMP 02/06/2017  SpO2 94%  BMI 36.17 kg/m2 Estimated body mass index is 36.17 kg/(m^2) as calculated from the following:    Height as of this encounter: 5' 4.5\" (1.638 m).    Weight as of this encounter: 214 lb (97.1 kg).  Medication Reconciliation: complete       Concepcion Taylor MA    "

## 2017-02-22 NOTE — PATIENT INSTRUCTIONS
"  Viral Syndrome (Adult)  A viral illness may cause a number of symptoms. The symptoms depend on the part of the body that the virus affects. If it settles in the nose, throat, and lungs, it may cause cough, sore throat, congestion, and sometimes headache. If it settles in the stomach and intestinal tract, it may cause vomiting and diarrhea. Sometimes it causes vague symptoms like \"aching all over,\" feeling tired, loss of appetite, or fever.  A viral illness usually lasts 1 to 2 weeks, but sometimes it lasts longer. In some cases, a more serious infection can look like a viral syndrome in the first few days of the illness. You may need another exam and additional tests to know the difference. Watch for the warning signs listed below.  Home care  Follow these guidelines for taking care of yourself at home:    If symptoms are severe, rest at home for the first 2 to 3 days.    Stay away from cigarette smoke - both your smoke and the smoke from others.    You may use over-the-counter medication acetaminophen or ibuprofen for fever, muscle aching, and headache, unless another medicine was prescribed for this. If you have chronic liver or kidney disease or ever had a stomach ulcer or GI bleeding, talk with your doctor before using these medicines . No one who is younger than 18 and ill with a fever should take aspirin. It may cause severe disease or death liver damage .    Your appetite may be poor, so a light diet is fine. Avoid dehydration by drinking 8 to 12 8-ounce glasses of fluids each day. This may include water; orange juice; lemonade; apple, grape, and cranberry juice; clear fruit drinks; electrolyte replacement and sports drinks; and decaffeinated teas and coffee. If you have been diagnosed with a kidney disease, ask your doctor how much and what types of fluids you should drink to prevent dehydration. If you have kidney disease, drinking too much fluid can cause it build up in the your body and be dangerous to " your health.    Over-the-counter remedies won't shorten the length of the illness but may be helpful for cough, sore throat; and nasal and sinus congestion. Don't use decongestants if you have high blood pressure.  Follow-up care  Follow up with your health care provider if you do not improve over the next week.  When to seek medical advice  Call your healthcare provider right away if any of these occur:    Cough with lots of colored sputum (mucus) or blood in your sputum    Chest pain, shortness of breath, wheezing, or difficulty breathing    Severe headache; face, neck, or ear pain    Severe, constant pain in the lower right side of your belly (abdominal)    Continued vomiting (can t keep liquids down)    Frequent diarrhea (more than 5 times a day); blood (red or black color) or mucus in diarrhea    Feeling weak, dizzy, or like you are going to faint    Extreme thirst    Fever of 100.4 F (38 C) or higher, or as directed by your healthcare provider  Call 911  Get emergency medical care if any of the following occur:    Convulsion    Feeling weak, dizzy, or like you are going to faint    Chest pain, shortness of breath, wheezing, or difficulty breathing    [id number] [employer]. [address] [place] [address], [place] [address]. All rights reserved. This information is not intended as a substitute for professional medical care. Always follow your healthcare professional's instructions.

## 2017-02-22 NOTE — PROGRESS NOTES
"  SUBJECTIVE:                                                    Siri Vyas is a 46 year old female who presents to clinic today for the following health issues:      Acute Illness   Acute illness concerns: URI  Onset: 3 days ago    Fever: YES- 102.5 T max    Chills/Sweats: YES    Headache (location?): YES    Sinus Pressure:YES    Conjunctivitis: NO    Ear Pain: YES: both    Rhinorrhea: NO    Congestion: YES    Sore Throat: YES     Cough: YES-nonproductive    Wheeze: YES    Decreased Appetite: YES    Nausea: no     Vomiting: no     Diarrhea:  no     Dysuria/Freq.: no     Fatigue/Achiness: YES    Sick/Strep Exposure: no      Therapies Tried and outcome: Tylenol to break fevers       Problem list and histories reviewed & adjusted, as indicated.  Additional history: as documented    Labs reviewed in EPIC  Problem list, Medication list, Allergies, and Medical/Social/Surgical histories reviewed in [facility] and updated as appropriate.    ROS:  Constitutional, HEENT, cardiovascular, pulmonary, gi and gu systems are negative, except as otherwise noted.    OBJECTIVE:                                                    /81  Pulse 110  Temp 99.5  F (37.5  C) (Oral)  Ht 5' 4.5\" (1.638 m)  Wt 214 lb (97.1 kg)  LMP 02/06/2017  SpO2 94%  BMI 36.17 kg/m2  Body mass index is 36.17 kg/(m^2).  GENERAL: healthy, alert and no distress  EYES: Eyes grossly normal to inspection, PERRL and conjunctivae and sclerae normal  HENT: ear canals and TM's normal, nose and mouth without ulcers or lesions, clear rhinorrhea  NECK: no adenopathy, no asymmetry, masses, or scars and thyroid normal to palpation  RESP:Loose, nonproductive cough, lungs clear to auscultation - no rales, rhonchi or wheezes  CV: regular rate and rhythm, normal S1 S2, no S3 or S4, no murmur, click or rub, no peripheral edema and peripheral pulses strong  ABDOMEN: soft, nontender, no hepatosplenomegaly, no masses and bowel sounds normal  MS: no gross musculoskeletal defects " "noted, no edema  SKIN: no suspicious lesions or rashes  PSYCH: mentation appears normal, affect normal/bright    Diagnostic Test Results:  No results found for this or any previous visit (from the past 24 hour(s)).     ASSESSMENT/PLAN:                                                        BP Screening:   Last 3 BP Readings:    BP Readings from Last 3 Encounters:   02/22/17 131/81   01/19/17 107/68   09/15/16 112/74       The following was recommended to the patient:  Re-screen BP within a year and recommended lifestyle modifications  BMI:   Estimated body mass index is 36.17 kg/(m^2) as calculated from the following:    Height as of this encounter: 5' 4.5\" (1.638 m).    Weight as of this encounter: 214 lb (97.1 kg).   Weight management plan: Discussed healthy diet and exercise guidelines and patient will follow up in 6 months in clinic to re-evaluate.      1. Viral upper respiratory illness  Patient looks ill- am treating with Tamiflu despite negative Rapid  Influenza A/B testing. We discussed the pathophysiology of influenza and viral etiology,reviewed the risks and benefits of various over the counter and prescription medications.  Additionally, we reviewed the infectious nature of this condition and techniques to minimize transmission and future infections.Wediscussed warning signs of worsening infection, if she deviates from the expected course, she will contact us.  I also discussed signs and symptoms of the flu, if other household contacts develop these symptoms, they are to come into the clinic immediately, as anti-viral medications need to be started within the first 48 hours.       - Influenza A/B antigen  - oseltamivir (TAMIFLU) 75 MG capsule; Take 1 capsule (75 mg) by mouth 2 times daily  Dispense: 10 capsule; Refill: 0    2. Throat pain    RST negative, await TC result.  Supportive measures for now- fluids, rest, humidified air, tylenol/Ibuprofen prn fever, body aches, hand washing reviewed, avoid " shared eating/drinking utensils.    - Rapid strep screen  - Beta strep group A culture    3. CARDIOVASCULAR SCREENING; LDL GOAL LESS THAN 160    - Lipid Profile (Chol, Trig, HDL, LDL calc); Future    4. Obesity, Class II, BMI 35-39.9 (H)  Benefits of weight loss reviewed in detail, encouraged her to cut back on the carbohydrates in the diet, consume more fruits and vegetables, drink plenty of water, avoid fruit juices, sodas, get 150 min moderate exercise/week.  Recheck weight in 6 months.        See Patient Instructions    [facility]

## 2017-02-22 NOTE — LETTER
[address] [address]  [phone]    February 22, 2017        Siri Vyas  [address]  [facility] [address]          To whom it may concern:    This patient missed work 2/22/2017 due to a clinic visit.  She can return to work when she is afebrile, likely not before Monday 2/27/17.    Please contact me for questions or concerns.        Sincerely,        Ines DEL RIO, CNP

## 2017-02-22 NOTE — MR AVS SNAPSHOT
"              After Visit Summary   2/22/2017    Siri Vyas    MRN: [id number]           Patient Information     Date Of Birth          1970        Visit Information        Provider Department      2/22/2017 1:20 PM Ines Mayen APRN CNP [facility] [address] [facility]        Today's Diagnoses     Viral upper respiratory illness    -  1    Throat pain        CARDIOVASCULAR SCREENING; LDL GOAL LESS THAN 160          Care Instructions      Viral Syndrome (Adult)  A viral illness may cause a number of symptoms. The symptoms depend on the part of the body that the virus affects. If it settles in the nose, throat, and lungs, it may cause cough, sore throat, congestion, and sometimes headache. If it settles in the stomach and intestinal tract, it may cause vomiting and diarrhea. Sometimes it causes vague symptoms like \"aching all over,\" feeling tired, loss of appetite, or fever.  A viral illness usually lasts 1 to 2 weeks, but sometimes it lasts longer. In some cases, a more serious infection can look like a viral syndrome in the first few days of the illness. You may need another exam and additional tests to know the difference. Watch for the warning signs listed below.  Home care  Follow these guidelines for taking care of yourself at home:    If symptoms are severe, rest at home for the first 2 to 3 days.    Stay away from cigarette smoke - both your smoke and the smoke from others.    You may use over-the-counter medication acetaminophen or ibuprofen for fever, muscle aching, and headache, unless another medicine was prescribed for this. If you have chronic liver or kidney disease or ever had a stomach ulcer or GI bleeding, talk with your doctor before using these medicines . No one who is younger than 18 and ill with a fever should take aspirin. It may cause severe disease or death liver damage .    Your appetite may be poor, so a light diet is fine. Avoid dehydration by drinking 8 to 12 8-ounce " glasses of fluids each day. This may include water; orange juice; lemonade; apple, grape, and cranberry juice; clear fruit drinks; electrolyte replacement and sports drinks; and decaffeinated teas and coffee. If you have been diagnosed with a kidney disease, ask your doctor how much and what types of fluids you should drink to prevent dehydration. If you have kidney disease, drinking too much fluid can cause it build up in the your body and be dangerous to your health.    Over-the-counter remedies won't shorten the length of the illness but may be helpful for cough, sore throat; and nasal and sinus congestion. Don't use decongestants if you have high blood pressure.  Follow-up care  Follow up with your health care provider if you do not improve over the next week.  When to seek medical advice  Call your healthcare provider right away if any of these occur:    Cough with lots of colored sputum (mucus) or blood in your sputum    Chest pain, shortness of breath, wheezing, or difficulty breathing    Severe headache; face, neck, or ear pain    Severe, constant pain in the lower right side of your belly (abdominal)    Continued vomiting (can t keep liquids down)    Frequent diarrhea (more than 5 times a day); blood (red or black color) or mucus in diarrhea    Feeling weak, dizzy, or like you are going to faint    Extreme thirst    Fever of 100.4 F (38 C) or higher, or as directed by your healthcare provider  Call 911  Get emergency medical care if any of the following occur:    Convulsion    Feeling weak, dizzy, or like you are going to faint    Chest pain, shortness of breath, wheezing, or difficulty breathing    9430-1587 [employer]. [address] [place] [address], [place] [address]. All rights reserved. This information is not intended as a substitute for professional medical care. Always follow your healthcare professional's instructions.              Follow-ups after your visit        Future tests that were  "ordered for you today     Open Future Orders        Priority Expected Expires Ordered    Lipid Profile (Chol, Trig, HDL, LDL calc) Routine  2/22/2018 2/22/2017            Who to contact     If you have questions or need follow up information about today's clinic visit or your schedule please contact [facility] directly at [phone].  Normal or non-critical lab and imaging results will be communicated to you by MyChart, letter or phone within 4 business days after the clinic has received the results. If you do not hear from us within 7 days, please contact the clinic through [employer] or phone. If you have a critical or abnormal lab result, we will notify you by phone as soon as possible.  Submit refill requests through [employer] or call your pharmacy and they will forward the refill request to us. Please allow 3 business days for your refill to be completed.          Additional Information About Your Visit        [employer] Information     [employer] gives you secure access to your electronic health record. If you see a primary care provider, you can also send messages to your care team and make appointments. If you have questions, please call your primary care clinic.  If you do not have a primary care provider, please call [phone] and they will assist you.        Care EveryWhere ID     This is your Care EveryWhere ID. This could be used by other organizations to access your [facility] medical records  [id number]        Your Vitals Were     Pulse Temperature Height Last Period Pulse Oximetry BMI (Body Mass Index)    110 99.5  F (37.5  C) (Oral) 5' 4.5\" (1.638 m) 02/06/2017 94% 36.17 kg/m2       Blood Pressure from Last 3 Encounters:   02/22/17 131/81   01/19/17 107/68   09/15/16 112/74    Weight from Last 3 Encounters:   02/22/17 214 lb (97.1 kg)   01/24/17 227 lb (103 kg)   01/19/17 226 lb (102.5 kg)              We Performed the Following     Beta strep group A culture     Influenza A/B antigen  "    Rapid strep screen          Today's Medication Changes          These changes are accurate as of: 2/22/17  2:46 PM.  If you have any questions, ask your nurse or doctor.               Start taking these medicines.        Dose/Directions    oseltamivir 75 MG capsule   Commonly known as:  TAMIFLU   Used for:  Viral upper respiratory illness   Started by:  Ines Mayen APRN CNP        Dose:  75 mg   Take 1 capsule (75 mg) by mouth 2 times daily   Quantity:  10 capsule   Refills:  0            Where to get your medicines      These medications were sent to [facility] [place], [place] - [address]  [address] [address] [facility] [address]     Phone:  [phone]     oseltamivir 75 MG capsule                Primary Care Provider Office Phone # Fax #    Chapito Coleman [phone]       [facility] [address]  [facility] [address]        Thank you!     Thank you for choosing [facility]  for your care. Our goal is always to provide you with excellent care. Hearing back from our patients is one way we can continue to improve our services. Please take a few minutes to complete the written survey that you may receive in the mail after your visit with us. Thank you!             Your Updated Medication List - Protect others around you: Learn how to safely use, store and throw away your medicines at www.disposemymeds.org.          This list is accurate as of: 2/22/17  2:46 PM.  Always use your most recent med list.                   Brand Name Dispense Instructions for use    fexofenadine 180 MG tablet    ALLEGRA    90 tablet    Take  by mouth. 1 TABLET DAILY FOR ALLERGIES       MULTIVITAMINS PO          OMEGA-3 FISH OIL PO          oseltamivir 75 MG capsule    TAMIFLU    10 capsule    Take 1 capsule (75 mg) by mouth 2 times daily       SUMAtriptan 25 MG tablet    IMITREX    9 tablet    Take 1-2 tablets (25-50 mg) by mouth at onset of  headache for migraine May repeat dose in 2 hours.  Do not exceed 200 mg in 24 hours       TYLENOL 325 MG tablet   Generic drug:  acetaminophen      Take 325-650 mg by mouth every 6 hours as needed

## 2017-02-24 LAB
BACTERIA SPEC CULT: NORMAL
MICRO REPORT STATUS: NORMAL
SPECIMEN SOURCE: NORMAL

## 2017-04-01 ENCOUNTER — EXTERNAL ORDER RESULTS (OUTPATIENT)
Dept: HEALTH INFORMATION MANAGEMENT | Facility: CLINIC | Age: 47
End: 2017-04-01

## 2017-04-24 ENCOUNTER — OFFICE VISIT (OUTPATIENT)
Dept: FAMILY MEDICINE | Facility: CLINIC | Age: 47
End: 2017-04-24
Payer: COMMERCIAL

## 2017-04-24 VITALS
OXYGEN SATURATION: 96 % | SYSTOLIC BLOOD PRESSURE: 122 MMHG | HEART RATE: 100 BPM | DIASTOLIC BLOOD PRESSURE: 78 MMHG | RESPIRATION RATE: 16 BRPM | HEIGHT: 65 IN | BODY MASS INDEX: 37.39 KG/M2 | TEMPERATURE: 98 F | WEIGHT: 224.4 LBS

## 2017-04-24 DIAGNOSIS — R10.9 ACUTE LEFT FLANK PAIN: ICD-10-CM

## 2017-04-24 DIAGNOSIS — Z13.6 CARDIOVASCULAR SCREENING; LDL GOAL LESS THAN 160: ICD-10-CM

## 2017-04-24 DIAGNOSIS — R10.12 LUQ ABDOMINAL PAIN: Primary | ICD-10-CM

## 2017-04-24 DIAGNOSIS — R19.7 DIARRHEA, UNSPECIFIED TYPE: ICD-10-CM

## 2017-04-24 LAB
ALBUMIN SERPL-MCNC: 3.5 G/DL (ref 3.4–5)
ALBUMIN UR-MCNC: NEGATIVE MG/DL
ALP SERPL-CCNC: 92 U/L (ref 40–150)
ALT SERPL W P-5'-P-CCNC: 34 U/L (ref 0–50)
ANION GAP SERPL CALCULATED.3IONS-SCNC: 8 MMOL/L (ref 3–14)
APPEARANCE UR: CLEAR
AST SERPL W P-5'-P-CCNC: 17 U/L (ref 0–45)
BASOPHILS # BLD AUTO: 0 10E9/L (ref 0–0.2)
BASOPHILS NFR BLD AUTO: 0.4 %
BILIRUB SERPL-MCNC: 0.2 MG/DL (ref 0.2–1.3)
BILIRUB UR QL STRIP: NEGATIVE
BUN SERPL-MCNC: 14 MG/DL (ref 7–30)
CALCIUM SERPL-MCNC: 9.2 MG/DL (ref 8.5–10.1)
CHLORIDE SERPL-SCNC: 102 MMOL/L (ref 94–109)
CHOLEST SERPL-MCNC: 203 MG/DL
CO2 SERPL-SCNC: 27 MMOL/L (ref 20–32)
COLOR UR AUTO: YELLOW
CREAT SERPL-MCNC: 0.6 MG/DL (ref 0.52–1.04)
CRP SERPL-MCNC: 10.5 MG/L (ref 0–8)
DIFFERENTIAL METHOD BLD: NORMAL
EOSINOPHIL # BLD AUTO: 0.1 10E9/L (ref 0–0.7)
EOSINOPHIL NFR BLD AUTO: 1.4 %
ERYTHROCYTE [DISTWIDTH] IN BLOOD BY AUTOMATED COUNT: 14.8 % (ref 10–15)
ERYTHROCYTE [SEDIMENTATION RATE] IN BLOOD BY WESTERGREN METHOD: 30 MM/H (ref 0–20)
GFR SERPL CREATININE-BSD FRML MDRD: ABNORMAL ML/MIN/1.7M2
GLUCOSE SERPL-MCNC: 111 MG/DL (ref 70–99)
GLUCOSE UR STRIP-MCNC: NEGATIVE MG/DL
HCT VFR BLD AUTO: 37.7 % (ref 35–47)
HDLC SERPL-MCNC: 60 MG/DL
HGB BLD-MCNC: 12 G/DL (ref 11.7–15.7)
HGB UR QL STRIP: NEGATIVE
KETONES UR STRIP-MCNC: NEGATIVE MG/DL
LDLC SERPL CALC-MCNC: 80 MG/DL
LEUKOCYTE ESTERASE UR QL STRIP: NEGATIVE
LIPASE SERPL-CCNC: 99 U/L (ref 73–393)
LYMPHOCYTES # BLD AUTO: 2.1 10E9/L (ref 0.8–5.3)
LYMPHOCYTES NFR BLD AUTO: 26.6 %
MCH RBC QN AUTO: 26.8 PG (ref 26.5–33)
MCHC RBC AUTO-ENTMCNC: 31.8 G/DL (ref 31.5–36.5)
MCV RBC AUTO: 84 FL (ref 78–100)
MONOCYTES # BLD AUTO: 0.6 10E9/L (ref 0–1.3)
MONOCYTES NFR BLD AUTO: 7.4 %
NEUTROPHILS # BLD AUTO: 5.1 10E9/L (ref 1.6–8.3)
NEUTROPHILS NFR BLD AUTO: 64.2 %
NITRATE UR QL: NEGATIVE
NONHDLC SERPL-MCNC: 143 MG/DL
PH UR STRIP: 5.5 PH (ref 5–7)
PLATELET # BLD AUTO: 345 10E9/L (ref 150–450)
POTASSIUM SERPL-SCNC: 3.9 MMOL/L (ref 3.4–5.3)
PROT SERPL-MCNC: 7.9 G/DL (ref 6.8–8.8)
RBC # BLD AUTO: 4.48 10E12/L (ref 3.8–5.2)
SODIUM SERPL-SCNC: 137 MMOL/L (ref 133–144)
SP GR UR STRIP: <=1.005 (ref 1–1.03)
TRIGL SERPL-MCNC: 316 MG/DL
URN SPEC COLLECT METH UR: NORMAL
UROBILINOGEN UR STRIP-ACNC: 0.2 EU/DL (ref 0.2–1)
WBC # BLD AUTO: 8 10E9/L (ref 4–11)

## 2017-04-24 PROCEDURE — 86140 C-REACTIVE PROTEIN: CPT | Performed by: FAMILY MEDICINE

## 2017-04-24 PROCEDURE — 83690 ASSAY OF LIPASE: CPT | Performed by: FAMILY MEDICINE

## 2017-04-24 PROCEDURE — 80053 COMPREHEN METABOLIC PANEL: CPT | Performed by: FAMILY MEDICINE

## 2017-04-24 PROCEDURE — 85025 COMPLETE CBC W/AUTO DIFF WBC: CPT | Performed by: FAMILY MEDICINE

## 2017-04-24 PROCEDURE — 99214 OFFICE O/P EST MOD 30 MIN: CPT | Performed by: FAMILY MEDICINE

## 2017-04-24 PROCEDURE — 80061 LIPID PANEL: CPT | Performed by: FAMILY MEDICINE

## 2017-04-24 PROCEDURE — 85652 RBC SED RATE AUTOMATED: CPT | Performed by: FAMILY MEDICINE

## 2017-04-24 PROCEDURE — 36415 COLL VENOUS BLD VENIPUNCTURE: CPT | Performed by: FAMILY MEDICINE

## 2017-04-24 PROCEDURE — 81003 URINALYSIS AUTO W/O SCOPE: CPT | Performed by: FAMILY MEDICINE

## 2017-04-24 ASSESSMENT — PAIN SCALES - GENERAL: PAINLEVEL: MODERATE PAIN (5)

## 2017-04-24 NOTE — PATIENT INSTRUCTIONS
Please schedule your DEXA scan or ultrasound by calling [facility] at [phone].     *Abdominal Pain, Unknown Cause (Female)    The exact cause of your abdominal (stomach) pain is not certain. This does not mean that this is something to worry about, or the right tests were not done. Everyone likes to know the exact cause of the problem, but sometimes with abdominal pain, there is no clear-cut cause, and this could be a good thing. The good news is that your symptoms can be treated, and you will feel better.   Your condition does not seem serious now; however, sometimes the signs of a serious problem may take more time to appear. For this reason, it is important for you to watch for any new symptoms, problems, or worsening of your condition.  Over the next few days, the abdominal pain may come and go, or be continuous. Other common symptoms can include nausea and vomiting. Sometimes it can be difficult to tell if you feel nauseous, you may just feel bad and not associate that feeling with nausea. Constipation, diarrhea, and a fever may go along with the pain.  The pain may continue even if treated correctly over the following days. Depending on how things go, sometimes the cause can become clear and may require further or different treatment. Additional evaluations, medications, or tests may be needed.  Home care  Your health care provider may prescribe medications for pain, symptoms, or an infection.  Follow the health care provider's instructions for taking these medications.  General care    Rest until your next exam. No strenuous activities.    Try to find positions that ease discomfort. A small pillow placed on the abdomen may help relieve pain.    Something warm on your abdomen (such as a heating pad) may help, but be careful not to burn yourself.  Diet    Do not force yourself to eat, especially if having cramps, vomiting, or diarrhea.    Water is important so you do not get dehydrated. Soup  may also be good. Sports drinks may also help, especially if they are not too acidic. Make sure you don't drink sugary drinks as this can make things worse. Take liquids in small amounts. Do not guzzle them.    Caffeine sometimes makes the pain and cramping worse.    Avoid dairy products if you have vomiting or diarrhea.    Don't eat large amounts at a time. Wait a few minutes between bites.    Eat a diet low in fiber (called a low-residue diet). Foods allowed include refined breads, white rice, fruit and vegetable juices without pulp, tender meats. These foods will pass more easily through the intestine.    Avoid fried or fatty foods, dairy, alcohol and spicy foods until your symptoms go away.  Follow-up care  Follow up with your health care provider as instructed, or if your pain does not begin to improve in the next 24 hours.  When to seek medical care  Seek prompt medical care if any of the following occur:    Pain gets worse or moves to the right lower abdomen    New or worsening vomiting or diarrhea    Swelling of the abdomen    Unable to pass stool for more than three days    New fever over 101  F (38.3 C), or rising fever    Blood in vomit or bowel movements (dark red or black color)    Jaundice (yellow color of eyes and skin)    Weakness, dizziness    Chest, arm, back, neck or jaw pain    Unexpected vaginal bleeding or missed period  Call 911  Call emergency services if any of the following occur:    Trouble breathing    Confusion    Fainting or loss of consciousness    Rapid heart rate    Seizure    6049-2202 [facility], [address] [place] [address]. All rights reserved. This information is not intended as a substitute for professional medical care. Always follow your healthcare professional's instructions.        Uncertain Causes of Diarrhea (Adult)    Diarrhea is when stools are loose and watery. This can be caused by:    Viral infections    Bacterial infections    Food poisoning     Parasites    Irritable bowel syndrome (IBS)    Inflammatory bowel diseases such as ulcerative colitis, Crohn's disease, and celiac disease    Food intolerance, such as to lactose, the sugar found in milk and milk products    Reaction to medicines like antibiotics, laxatives, cancer drugs, and antacids  Along with diarrhea, you may also have:    Abdominal pain and cramping    Nausea and vomiting    Loss of bowel control    Fever and chills    Bloody stools  In some cases, antibiotics may help to treat diarrhea. You may have a stool sample test. This is done to see what is causing your diarrhea, and if antibiotics will help treat it. The results of a stool sample test may take up to 2 days. The healthcare provider may not give you antibiotics until he or she has the stool test results.  Diarrhea can cause dehydration. This is the loss of too much water and other fluids from the body. When this occurs, body fluid must be replaced. This can be done with oral rehydration solutions. Oral rehydration solutions are available at drugstores and grocery stores without a prescription.  Home care  Follow all instructions given by your healthcare provider. Rest at home for the next 24 hours, or until you feel better. Avoid caffeine, tobacco, and alcohol. These can make diarrhea, cramping, and pain worse.  If taking medicines:    Don t take over-the-counter diarrhea or nausea medicines unless your healthcare provider tells you to.    You may use acetaminophen or NSAID medicines like ibuprofen or naproxen to reduce pain and fever. Don t use these if you have chronic liver or kidney disease, or ever had a stomach ulcer or gastrointestinal bleeding. Don't use NSAID medicines if you are already taking one for another condition (like arthritis) or are on daily aspirin therapy (such as for heart disease or after a stroke). Talk with your healthcare provider first.    If antibiotics were prescribed, be sure you take them until they are  finished. Don t stop taking them even when you feel better. Antibiotics must be taken as a full course.  To prevent the spread of illness:    Remember that washing with soap and water and using alcohol-based  is the best way to prevent the spread of infection.    Clean the toilet after each use.    Wash your hands before eating.    Wash your hands before and after preparing food. Keep in mind that people with diarrhea or vomiting should not prepare food for others.    Wash your hands after using cutting boards, countertops, and knives that have been in contact with raw foods.    Wash and then peel fruits and vegetables.    Keep uncooked meats away from cooked and ready-to-eat foods.    Use a food thermometer when cooking. Cook poultry to at least 165 F (74 C). Cook ground meat (beef, veal, pork, lamb) to at least 160 F (71 C). Cook fresh beef, veal, lamb, and pork to at least 145 F (63 C).    Don t eat raw or undercooked eggs (poached or rico side up), poultry, meat, or unpasteurized milk and juices.  Food and drinks  The main goal while treating vomiting or diarrhea is to prevent dehydration. This is done by taking small amounts of liquids often.    Keep in mind that liquids are more important than food right now.    Drink only small amounts of liquids at a time.    Don t force yourself to eat, especially if you are having cramping, vomiting, or diarrhea. Don t eat large amounts at a time, even if you are hungry.    If you eat, avoid fatty, greasy, spicy, or fried foods.    Don t eat dairy foods or drink milk if you have diarrhea. These can make diarrhea worse.  During the first 24 hours you can try:    Oral rehydration solutions. Do not use sports drinks. They have too much sugar and not enough electrolytes.    Soft drinks without caffeine    Ginger ale    Water (plain or flavored)    Decaf tea or coffee    Clear broth, consommé, or bouillon    Gelatin, popsicles, or frozen fruit juice bars  The second  24 hours, if you are feeling better, you can add:    Hot cereal, plain toast, bread, rolls, or crackers    Plain noodles, rice, mashed potatoes, chicken noodle soup, or rice soup    Unsweetened canned fruit (no pineapple)    Bananas  As you recover:    Limit fat intake to less than 15 grams per day. Don t eat margarine, butter, oils, mayonnaise, sauces, gravies, fried foods, peanut butter, meat, poultry, or fish.    Limit fiber. Don t eat raw or cooked vegetables, fresh fruits except bananas, or bran cereals.    Limit caffeine and chocolate.    Limit dairy.    Don t use spices or seasonings except salt.    Go back to your normal diet over time, as you feel better and your symptoms improve.    If the symptoms come back, go back to a simple diet or clear liquids.  Follow-up care  Follow up with your healthcare provider, or as advised. If a stool sample was taken or cultures were done, call the healthcare provider for the results as instructed.  Call 911  Call 911 if you have any of these symptoms:    Trouble breathing    Confusion    Extreme drowsiness or trouble walking    Loss of consciousness    Rapid heart rate    Chest pain    Stiff neck    Seizure  When to seek medical advice  Call your healthcare provider right away if any of these occur:    Abdominal pain that gets worse    Constant lower right abdominal pain    Continued vomiting and inability to keep liquids down    Diarrhea more than 5 times a day    Blood in vomit or stool    Dark urine or no urine for 8 hours, dry mouth and tongue, tiredness, weakness, or dizziness    Drowsiness    New rash    You don t get better in 2 to 3 days    Fever of 100.4 F (38 C) or higher that doesn t get lower with medicine    8751-5173 [employer]. [address] [place] [address], [place] [address]. All rights reserved. This information is not intended as a substitute for professional medical care. Always follow your healthcare professional's instructions.

## 2017-04-24 NOTE — PROGRESS NOTES
SUBJECTIVE:                                                    Siri Vyas is a 46 year old female who presents to clinic today for the following health issues:      Left flank Pain     Onset: 2 weeks    Description:   Location: L side in back, started in back, pain has radiated to L flank, L abdomen  Character: Sharp and Stabbing    Intensity: 5/10    Progression of Symptoms: intermittent    Accompanying Signs & Symptoms:  Other symptoms:loose stools and diarrhea, bloated, nausea, HA, gas   History:   Previous similar pain: no       Precipitating factors:   Trauma or overuse: no     Alleviating factors:  Improved by: nothing       Therapies Tried and outcome: Tylenol - didn't notice improvement    FYI - pt does have Essure  (permanent birth control by [employer] -- possibly placed 4/5 years ago)    No fever, chills or sweats.     History of 4 C-sections, cholecystectomy during pregnancy, dermoid cysts, adhesion removal, bile duct stone removal.           Problem list and histories reviewed & adjusted, as indicated.  Additional history: as documented    Patient Active Problem List   Diagnosis     Seasonal allergic rhinitis     CARDIOVASCULAR SCREENING; LDL GOAL LESS THAN 160     S/P cholecystectomy     Dermoid cyst of ovary     H/O:  section     LUQ abdominal pain     Paresthesias/numbness     Median neuropathy     Epicondylitis, medial humeral     Tendinitis of left wrist     Headache     Chondromalacia, knee, left     Obesity, Class II, BMI 35-39.9 (H)     Past Surgical History:   Procedure Laterality Date     Dermoid cyst  (OVARY) removal       GYN SURGERY  ,,,          LAPAROSCOPIC CHOLECYSTECTOMY       SINUS SURGERY  2002    Nasls sinuses     TONSILLECTOMY  1999       Social History   Substance Use Topics     Smoking status: Never Smoker     Smokeless tobacco: Never Used      Comment: no second hand smoke     Alcohol use Yes      Comment: 1-2 days in a week.  Each time 1-2 beers. 0-5 beers weeks     Family History   Problem Relation Age of Onset     Breast Cancer Mother      Lipids Mother      Hypertension Mother      Hearing Loss Mother      Lipids Brother      CEREBROVASCULAR DISEASE Maternal Grandmother 76     Neurologic Disorder Son 14     migraines         Current Outpatient Prescriptions   Medication Sig Dispense Refill     Omega-3 Fatty Acids (OMEGA-3 FISH OIL PO)        Multiple Vitamin (MULTIVITAMINS PO)        SUMAtriptan (IMITREX) 25 MG tablet Take 1-2 tablets (25-50 mg) by mouth at onset of headache for migraine May repeat dose in 2 hours.  Do not exceed 200 mg in 24 hours 9 tablet 5     acetaminophen (TYLENOL) 325 MG tablet Take 325-650 mg by mouth every 6 hours as needed       fexofenadine (ALLEGRA) 180 MG tablet Take  by mouth. 1 TABLET DAILY FOR ALLERGIES 90 tablet 0     Allergies   Allergen Reactions     Dust Mites      No Clinical Screening - See Comments Other (See Comments)     No Known Drug Allergy      Seasonal Allergies      Recent Labs   Lab Test  04/24/17   1622  01/21/16   1427  04/11/12   1224   LDL  80   --    --    HDL  60   --    --    TRIG  316*   --    --    ALT  34  26  18   CR  0.60  0.78  0.59   GFRESTIMATED  >90  Non  GFR Calc    80  >90   GFRESTBLACK  >90   GFR Calc    >90   GFR Calc    >90   POTASSIUM  3.9  3.5  3.6   TSH   --    --   1.72      BP Readings from Last 3 Encounters:   04/24/17 122/78   02/22/17 131/81   01/19/17 107/68    Wt Readings from Last 3 Encounters:   04/24/17 224 lb 6.4 oz (101.8 kg)   02/22/17 214 lb (97.1 kg)   01/24/17 227 lb (103 kg)                  Labs reviewed in EPIC    Reviewed and updated as needed this visit by clinical staff  Tobacco  Allergies  Meds  Med Hx  Surg Hx  Fam Hx  Soc Hx      Reviewed and updated as needed this visit by Provider         ROS:  Constitutional, HEENT, cardiovascular, pulmonary, gi and gu systems are negative, except as  "otherwise noted.    OBJECTIVE:                                                    /78 (BP Location: Right arm, Patient Position: Right side, Cuff Size: Adult Large)  Pulse 100  Temp 98  F (36.7  C) (Oral)  Resp 16  Ht 5' 4.5\" (1.638 m)  Wt 224 lb 6.4 oz (101.8 kg)  LMP 04/14/2017 (Exact Date)  SpO2 96%  BMI 37.92 kg/m2  Body mass index is 37.92 kg/(m^2).  GENERAL: healthy, alert, well nourished, well hydrated, no distress, obese  HENT: ear canals- normal; TMs- normal; Nose- normal; Mouth- no ulcers, no lesions, throat is clear with no erythema or exudate.   NECK: no tenderness, no adenopathy, no asymmetry, no masses, no stiffness; thyroid- normal to palpation  RESP: lungs clear to auscultation - no rales, no rhonchi, no wheezes  CV: regular rates and rhythm, normal S1 S2, no S3 or S4 and no murmur, no click or rub -  ABDOMEN: soft, left CVA tenderness to percussion and left upper quadrant tenderness with no rebound, no  hepatosplenomegaly, no masses, normal bowel sounds  MS: extremities- no gross deformities noted, no edema  SKIN: no suspicious lesions, no rashes  NEURO: strength and tone- normal, sensory exam- grossly normal, mentation- intact, speech- normal, reflexes- symmetric  BACK: no CVA tenderness, no paralumbar tenderness  PSYCH: Alert and oriented times 3; speech- coherent , normal rate and volume; able to articulate logical thoughts, able to abstract reason, no tangential thoughts, no hallucinations or delusions, affect- normal     Diagnostic Test Results:  Results for orders placed or performed in visit on 04/24/17 (from the past 24 hour(s))   Lipid panel reflex to direct LDL   Result Value Ref Range    Cholesterol 203 (H) <200 mg/dL    Triglycerides 316 (H) <150 mg/dL    HDL Cholesterol 60 >49 mg/dL    LDL Cholesterol Calculated 80 <100 mg/dL    Non HDL Cholesterol 143 (H) <130 mg/dL   Comprehensive metabolic panel   Result Value Ref Range    Sodium 137 133 - 144 mmol/L    Potassium 3.9 " 3.4 - 5.3 mmol/L    Chloride 102 94 - 109 mmol/L    Carbon Dioxide 27 20 - 32 mmol/L    Anion Gap 8 3 - 14 mmol/L    Glucose 111 (H) 70 - 99 mg/dL    Urea Nitrogen 14 7 - 30 mg/dL    Creatinine 0.60 0.52 - 1.04 mg/dL    GFR Estimate >90  Non  GFR Calc   >60 mL/min/1.7m2    GFR Estimate If Black >90   GFR Calc   >60 mL/min/1.7m2    Calcium 9.2 8.5 - 10.1 mg/dL    Bilirubin Total 0.2 0.2 - 1.3 mg/dL    Albumin 3.5 3.4 - 5.0 g/dL    Protein Total 7.9 6.8 - 8.8 g/dL    Alkaline Phosphatase 92 40 - 150 U/L    ALT 34 0 - 50 U/L    AST 17 0 - 45 U/L   CBC with platelets differential   Result Value Ref Range    WBC 8.0 4.0 - 11.0 10e9/L    RBC Count 4.48 3.8 - 5.2 10e12/L    Hemoglobin 12.0 11.7 - 15.7 g/dL    Hematocrit 37.7 35.0 - 47.0 %    MCV 84 78 - 100 fl    MCH 26.8 26.5 - 33.0 pg    MCHC 31.8 31.5 - 36.5 g/dL    RDW 14.8 10.0 - 15.0 %    Platelet Count 345 150 - 450 10e9/L    Diff Method Automated Method     % Neutrophils 64.2 %    % Lymphocytes 26.6 %    % Monocytes 7.4 %    % Eosinophils 1.4 %    % Basophils 0.4 %    Absolute Neutrophil 5.1 1.6 - 8.3 10e9/L    Absolute Lymphocytes 2.1 0.8 - 5.3 10e9/L    Absolute Monocytes 0.6 0.0 - 1.3 10e9/L    Absolute Eosinophils 0.1 0.0 - 0.7 10e9/L    Absolute Basophils 0.0 0.0 - 0.2 10e9/L   CRP inflammation   Result Value Ref Range    CRP Inflammation 10.5 (H) 0.0 - 8.0 mg/L   Erythrocyte sedimentation rate auto   Result Value Ref Range    Sed Rate 30 (H) 0 - 20 mm/h   UA reflex to Microscopic and Culture   Result Value Ref Range    Color Urine Yellow     Appearance Urine Clear     Glucose Urine Negative NEG mg/dL    Bilirubin Urine Negative NEG    Ketones Urine Negative NEG mg/dL    Specific Gravity Urine <=1.005 1.003 - 1.035    Blood Urine Negative NEG    pH Urine 5.5 5.0 - 7.0 pH    Protein Albumin Urine Negative NEG mg/dL    Urobilinogen Urine 0.2 0.2 - 1.0 EU/dL    Nitrite Urine Negative NEG    Leukocyte Esterase Urine Negative NEG     "Source Midstream Urine    Lipase   Result Value Ref Range    Lipase 99 73 - 393 U/L        ASSESSMENT/PLAN:                                                        BMI:   Estimated body mass index is 37.92 kg/(m^2) as calculated from the following:    Height as of this encounter: 5' 4.5\" (1.638 m).    Weight as of this encounter: 224 lb 6.4 oz (101.8 kg).   Weight management plan: Discussed healthy diet and exercise guidelines and patient will follow up in 1 month in clinic to re-evaluate.        ICD-10-CM    1. LUQ abdominal pain- 2 weeks intermittent but not severe and labs are not remarkable. Ultrasound to see if there are any kidney stones. CT if symptoms persist R10.12 Comprehensive metabolic panel     CBC with platelets differential     CRP inflammation     Erythrocyte sedimentation rate auto     UA reflex to Microscopic and Culture     Lipase   2. Acute left flank pain R10.9 US Abdomen Complete   3. Diarrhea, unspecified type R19.7 Pepto Bismol as needed for diarrhea with stomach cramps.    4. CARDIOVASCULAR SCREENING; LDL GOAL LESS THAN 160 Z13.6 Lipid panel reflex to direct LDL       FURTHER TESTING:       - abdominal ultrasound       - CT abdomen if needed for diagnosis  CONSULTATION/REFERRAL to emergency department if symptoms worse.   FUTURE LABS:       - Schedule fasting labs in 6 months  FUTURE APPOINTMENTS:       - Follow-up visit in 1-2 weeks or sooner if any questions or concerns.   See Patient Instructions    Yamileth Mensah MD  [facility]    "

## 2017-04-24 NOTE — NURSING NOTE
"Chief Complaint   Patient presents with     Flank Pain       Initial /78 (BP Location: Right arm, Patient Position: Right side, Cuff Size: Adult Large)  Pulse 100  Temp 98  F (36.7  C) (Oral)  Resp 16  Ht 5' 4.5\" (1.638 m)  Wt 224 lb 6.4 oz (101.8 kg)  SpO2 96%  BMI 37.92 kg/m2 Estimated body mass index is 37.92 kg/(m^2) as calculated from the following:    Height as of this encounter: 5' 4.5\" (1.638 m).    Weight as of this encounter: 224 lb 6.4 oz (101.8 kg).  Medication Reconciliation: complete   Will Estelita HOLGUIN      "

## 2017-04-24 NOTE — MR AVS SNAPSHOT
After Visit Summary   4/24/2017    Siri Vyas    MRN: [id number]           Patient Information     Date Of Birth          1970        Visit Information        Provider Department      4/24/2017 3:40 PM Yamileth Mensah MD [facility]        Today's Diagnoses     LUQ abdominal pain    -  1    Acute left flank pain        Diarrhea, unspecified type        CARDIOVASCULAR SCREENING; LDL GOAL LESS THAN 160          Care Instructions      Please schedule your DEXA scan or ultrasound by calling [facility] at [phone].     *Abdominal Pain, Unknown Cause (Female)    The exact cause of your abdominal (stomach) pain is not certain. This does not mean that this is something to worry about, or the right tests were not done. Everyone likes to know the exact cause of the problem, but sometimes with abdominal pain, there is no clear-cut cause, and this could be a good thing. The good news is that your symptoms can be treated, and you will feel better.   Your condition does not seem serious now; however, sometimes the signs of a serious problem may take more time to appear. For this reason, it is important for you to watch for any new symptoms, problems, or worsening of your condition.  Over the next few days, the abdominal pain may come and go, or be continuous. Other common symptoms can include nausea and vomiting. Sometimes it can be difficult to tell if you feel nauseous, you may just feel bad and not associate that feeling with nausea. Constipation, diarrhea, and a fever may go along with the pain.  The pain may continue even if treated correctly over the following days. Depending on how things go, sometimes the cause can become clear and may require further or different treatment. Additional evaluations, medications, or tests may be needed.  Home care  Your health care provider may prescribe medications for pain, symptoms, or an infection.  Follow the health care  provider's instructions for taking these medications.  General care    Rest until your next exam. No strenuous activities.    Try to find positions that ease discomfort. A small pillow placed on the abdomen may help relieve pain.    Something warm on your abdomen (such as a heating pad) may help, but be careful not to burn yourself.  Diet    Do not force yourself to eat, especially if having cramps, vomiting, or diarrhea.    Water is important so you do not get dehydrated. Soup may also be good. Sports drinks may also help, especially if they are not too acidic. Make sure you don't drink sugary drinks as this can make things worse. Take liquids in small amounts. Do not guzzle them.    Caffeine sometimes makes the pain and cramping worse.    Avoid dairy products if you have vomiting or diarrhea.    Don't eat large amounts at a time. Wait a few minutes between bites.    Eat a diet low in fiber (called a low-residue diet). Foods allowed include refined breads, white rice, fruit and vegetable juices without pulp, tender meats. These foods will pass more easily through the intestine.    Avoid fried or fatty foods, dairy, alcohol and spicy foods until your symptoms go away.  Follow-up care  Follow up with your health care provider as instructed, or if your pain does not begin to improve in the next 24 hours.  When to seek medical care  Seek prompt medical care if any of the following occur:    Pain gets worse or moves to the right lower abdomen    New or worsening vomiting or diarrhea    Swelling of the abdomen    Unable to pass stool for more than three days    New fever over 101  F (38.3 C), or rising fever    Blood in vomit or bowel movements (dark red or black color)    Jaundice (yellow color of eyes and skin)    Weakness, dizziness    Chest, arm, back, neck or jaw pain    Unexpected vaginal bleeding or missed period  Call 911  Call emergency services if any of the following occur:    Trouble  breathing    Confusion    Fainting or loss of consciousness    Rapid heart rate    Seizure    [id number] [employer], [address] [facility], [place] [address]. All rights reserved. This information is not intended as a substitute for professional medical care. Always follow your healthcare professional's instructions.        Uncertain Causes of Diarrhea (Adult)    Diarrhea is when stools are loose and watery. This can be caused by:    Viral infections    Bacterial infections    Food poisoning    Parasites    Irritable bowel syndrome (IBS)    Inflammatory bowel diseases such as ulcerative colitis, Crohn's disease, and celiac disease    Food intolerance, such as to lactose, the sugar found in milk and milk products    Reaction to medicines like antibiotics, laxatives, cancer drugs, and antacids  Along with diarrhea, you may also have:    Abdominal pain and cramping    Nausea and vomiting    Loss of bowel control    Fever and chills    Bloody stools  In some cases, antibiotics may help to treat diarrhea. You may have a stool sample test. This is done to see what is causing your diarrhea, and if antibiotics will help treat it. The results of a stool sample test may take up to 2 days. The healthcare provider may not give you antibiotics until he or she has the stool test results.  Diarrhea can cause dehydration. This is the loss of too much water and other fluids from the body. When this occurs, body fluid must be replaced. This can be done with oral rehydration solutions. Oral rehydration solutions are available at drugstores and grocery stores without a prescription.  Home care  Follow all instructions given by your healthcare provider. Rest at home for the next 24 hours, or until you feel better. Avoid caffeine, tobacco, and alcohol. These can make diarrhea, cramping, and pain worse.  If taking medicines:    Don t take over-the-counter diarrhea or nausea medicines unless your healthcare provider tells you  to.    You may use acetaminophen or NSAID medicines like ibuprofen or naproxen to reduce pain and fever. Don t use these if you have chronic liver or kidney disease, or ever had a stomach ulcer or gastrointestinal bleeding. Don't use NSAID medicines if you are already taking one for another condition (like arthritis) or are on daily aspirin therapy (such as for heart disease or after a stroke). Talk with your healthcare provider first.    If antibiotics were prescribed, be sure you take them until they are finished. Don t stop taking them even when you feel better. Antibiotics must be taken as a full course.  To prevent the spread of illness:    Remember that washing with soap and water and using alcohol-based  is the best way to prevent the spread of infection.    Clean the toilet after each use.    Wash your hands before eating.    Wash your hands before and after preparing food. Keep in mind that people with diarrhea or vomiting should not prepare food for others.    Wash your hands after using cutting boards, countertops, and knives that have been in contact with raw foods.    Wash and then peel fruits and vegetables.    Keep uncooked meats away from cooked and ready-to-eat foods.    Use a food thermometer when cooking. Cook poultry to at least 165 F (74 C). Cook ground meat (beef, veal, pork, lamb) to at least 160 F (71 C). Cook fresh beef, veal, lamb, and pork to at least 145 F (63 C).    Don t eat raw or undercooked eggs (poached or rico side up), poultry, meat, or unpasteurized milk and juices.  Food and drinks  The main goal while treating vomiting or diarrhea is to prevent dehydration. This is done by taking small amounts of liquids often.    Keep in mind that liquids are more important than food right now.    Drink only small amounts of liquids at a time.    Don t force yourself to eat, especially if you are having cramping, vomiting, or diarrhea. Don t eat large amounts at a time, even if you  are hungry.    If you eat, avoid fatty, greasy, spicy, or fried foods.    Don t eat dairy foods or drink milk if you have diarrhea. These can make diarrhea worse.  During the first 24 hours you can try:    Oral rehydration solutions. Do not use sports drinks. They have too much sugar and not enough electrolytes.    Soft drinks without caffeine    Ginger ale    Water (plain or flavored)    Decaf tea or coffee    Clear broth, consommé, or bouillon    Gelatin, popsicles, or frozen fruit juice bars  The second 24 hours, if you are feeling better, you can add:    Hot cereal, plain toast, bread, rolls, or crackers    Plain noodles, rice, mashed potatoes, chicken noodle soup, or rice soup    Unsweetened canned fruit (no pineapple)    Bananas  As you recover:    Limit fat intake to less than 15 grams per day. Don t eat margarine, butter, oils, mayonnaise, sauces, gravies, fried foods, peanut butter, meat, poultry, or fish.    Limit fiber. Don t eat raw or cooked vegetables, fresh fruits except bananas, or bran cereals.    Limit caffeine and chocolate.    Limit dairy.    Don t use spices or seasonings except salt.    Go back to your normal diet over time, as you feel better and your symptoms improve.    If the symptoms come back, go back to a simple diet or clear liquids.  Follow-up care  Follow up with your healthcare provider, or as advised. If a stool sample was taken or cultures were done, call the healthcare provider for the results as instructed.  Call 911  Call 911 if you have any of these symptoms:    Trouble breathing    Confusion    Extreme drowsiness or trouble walking    Loss of consciousness    Rapid heart rate    Chest pain    Stiff neck    Seizure  When to seek medical advice  Call your healthcare provider right away if any of these occur:    Abdominal pain that gets worse    Constant lower right abdominal pain    Continued vomiting and inability to keep liquids down    Diarrhea more than 5 times a  day    Blood in vomit or stool    Dark urine or no urine for 8 hours, dry mouth and tongue, tiredness, weakness, or dizziness    Drowsiness    New rash    You don t get better in 2 to 3 days    Fever of 100.4 F (38 C) or higher that doesn t get lower with medicine    [id number] [employer]. [address] [place] [address]. All rights reserved. This information is not intended as a substitute for professional medical care. Always follow your healthcare professional's instructions.              Follow-ups after your visit        Future tests that were ordered for you today     Open Future Orders        Priority Expected Expires Ordered    US Abdomen Complete Routine  4/24/2018 4/24/2017            Who to contact     If you have questions or need follow up information about today's clinic visit or your schedule please contact [facility] directly at [phone].  Normal or non-critical lab and imaging results will be communicated to you by [employer], letter or phone within 4 business days after the clinic has received the results. If you do not hear from us within 7 days, please contact the clinic through [employer] or phone. If you have a critical or abnormal lab result, we will notify you by phone as soon as possible.  Submit refill requests through [facility] or call your pharmacy and they will forward the refill request to us. Please allow 3 business days for your refill to be completed.          Additional Information About Your Visit        MyChart Information     [facility] gives you secure access to your electronic health record. If you see a primary care provider, you can also send messages to your care team and make appointments. If you have questions, please call your primary care clinic.  If you do not have a primary care provider, please call [phone] and they will assist you.        Care EveryWhere ID     This is your Care EveryWhere ID. This could be used by other  "organizations to access your [facility] medical records  [id number]        Your Vitals Were     Pulse Temperature Respirations Height Last Period Pulse Oximetry    100 98  F (36.7  C) (Oral) 16 5' 4.5\" (1.638 m) 04/14/2017 (Exact Date) 96%    BMI (Body Mass Index)                   37.92 kg/m2            Blood Pressure from Last 3 Encounters:   04/24/17 122/78   02/22/17 131/81   01/19/17 107/68    Weight from Last 3 Encounters:   04/24/17 224 lb 6.4 oz (101.8 kg)   02/22/17 214 lb (97.1 kg)   01/24/17 227 lb (103 kg)              We Performed the Following     CBC with platelets differential     Comprehensive metabolic panel     CRP inflammation     Erythrocyte sedimentation rate auto     Lipase     Lipid panel reflex to direct LDL     UA reflex to Microscopic and Culture        Primary Care Provider Office Phone # Fax #    Chapito [place] [phone]       [facility] [address]  [facility] [address]        Thank you!     Thank you for choosing [facility]  for your care. Our goal is always to provide you with excellent care. Hearing back from our patients is one way we can continue to improve our services. Please take a few minutes to complete the written survey that you may receive in the mail after your visit with us. Thank you!             Your Updated Medication List - Protect others around you: Learn how to safely use, store and throw away your medicines at www.disposemymeds.org.          This list is accurate as of: 4/24/17  4:17 PM.  Always use your most recent med list.                   Brand Name Dispense Instructions for use    fexofenadine 180 MG tablet    ALLEGRA    90 tablet    Take  by mouth. 1 TABLET DAILY FOR ALLERGIES       MULTIVITAMINS PO          OMEGA-3 FISH OIL PO          SUMAtriptan 25 MG tablet    IMITREX    9 tablet    Take 1-2 tablets (25-50 mg) by mouth at onset of headache for migraine May repeat dose in 2 hours.  Do not exceed 200 mg " in 24 hours       TYLENOL 325 MG tablet   Generic drug:  acetaminophen      Take 325-650 mg by mouth every 6 hours as needed

## 2017-04-25 ENCOUNTER — RADIANT APPOINTMENT (OUTPATIENT)
Dept: ULTRASOUND IMAGING | Facility: CLINIC | Age: 47
End: 2017-04-25
Attending: FAMILY MEDICINE
Payer: COMMERCIAL

## 2017-04-25 DIAGNOSIS — R10.9 ACUTE LEFT FLANK PAIN: ICD-10-CM

## 2017-04-25 PROCEDURE — 76700 US EXAM ABDOM COMPLETE: CPT | Performed by: STUDENT IN AN ORGANIZED HEALTH CARE EDUCATION/TRAINING PROGRAM

## 2017-04-25 NOTE — PROGRESS NOTES
Dear Siri    Your test results are attached. I am happy to let you know that they are stable.    The tests for inflammation are mildly elevated but the CBC is normal. The kidney and liver tests were normal. The urinalysis does not show any infection or blood. These are all reassuring tests. It will be important to look at the ultrasound results to see if there is any obstruction with a common bile duct stone or kidney stone.     Please contact me by MyChart if you have any questions about your labs or management.    Yamileth Mensah MD

## 2017-04-26 NOTE — PROGRESS NOTES
Dear Siri    Your test results are attached.     The ultrasound shows the absent gallbladder and no signs of a recurrent stone. The kidneys are healthy. If you are continuing to have pain, we may need to follow up with either a CT scan or MRI.     Please contact me by MyChart if you have any questions about your labs or management.    Yamileth Mensah MD

## 2017-04-27 ENCOUNTER — TRANSFERRED RECORDS (OUTPATIENT)
Dept: HEALTH INFORMATION MANAGEMENT | Facility: CLINIC | Age: 47
End: 2017-04-27

## 2017-04-28 ENCOUNTER — RADIANT APPOINTMENT (OUTPATIENT)
Dept: CT IMAGING | Facility: CLINIC | Age: 47
End: 2017-04-28
Attending: FAMILY MEDICINE
Payer: COMMERCIAL

## 2017-04-28 DIAGNOSIS — R10.9 LEFT FLANK PAIN: ICD-10-CM

## 2017-04-28 DIAGNOSIS — R10.12 ABDOMINAL PAIN, LEFT UPPER QUADRANT: ICD-10-CM

## 2017-04-28 PROCEDURE — 74177 CT ABD & PELVIS W/CONTRAST: CPT | Performed by: RADIOLOGY

## 2017-04-28 RX ORDER — IOPAMIDOL 755 MG/ML
135 INJECTION, SOLUTION INTRAVASCULAR ONCE
Status: COMPLETED | OUTPATIENT
Start: 2017-04-28 | End: 2017-04-28

## 2017-04-28 RX ADMIN — IOPAMIDOL 135 ML: 755 INJECTION, SOLUTION INTRAVASCULAR at 15:04

## 2017-04-29 NOTE — PROGRESS NOTES
Dear Siri    Your test results are attached.    The CT scan and ultrasounds show that there is nothing bad going on in your abdomen or pelvis to explain the pain you are experiencing in your left side. This makes it more likely to be muscle related pain or from the back. Please schedule a follow up appointment so that we can focus on this area and see if we can get you better relief from this pain.     Please contact me by Splashhart if you have any questions about your labs or management.    Yamileth Mensah MD

## 2017-05-01 ENCOUNTER — OFFICE VISIT (OUTPATIENT)
Dept: FAMILY MEDICINE | Facility: CLINIC | Age: 47
End: 2017-05-01
Payer: COMMERCIAL

## 2017-05-01 VITALS
TEMPERATURE: 97.9 F | BODY MASS INDEX: 36.82 KG/M2 | HEIGHT: 65 IN | WEIGHT: 221 LBS | DIASTOLIC BLOOD PRESSURE: 74 MMHG | SYSTOLIC BLOOD PRESSURE: 112 MMHG | HEART RATE: 80 BPM | OXYGEN SATURATION: 96 %

## 2017-05-01 DIAGNOSIS — E66.812 OBESITY, CLASS II, BMI 35-39.9: ICD-10-CM

## 2017-05-01 DIAGNOSIS — R10.12 ABDOMINAL PAIN, LEFT UPPER QUADRANT: Primary | ICD-10-CM

## 2017-05-01 DIAGNOSIS — Z90.49 S/P CHOLECYSTECTOMY: ICD-10-CM

## 2017-05-01 DIAGNOSIS — K52.9: ICD-10-CM

## 2017-05-01 PROCEDURE — 99214 OFFICE O/P EST MOD 30 MIN: CPT | Performed by: FAMILY MEDICINE

## 2017-05-01 NOTE — MR AVS SNAPSHOT
After Visit Summary   5/1/2017    Siri Vyas    MRN: [id number]           Patient Information     Date Of Birth          1970        Visit Information        Provider Department      5/1/2017 2:20 PM Yamileth Mensah MD [facility]        Today's Diagnoses     Abdominal pain, left upper quadrant    -  1    Inflammation of large intestine          Care Instructions      Crohn s Disease    Crohn s disease is inflammation of the intestinal tract that comes and goes in  flare-ups . This is a chronic (long-term) illness. During a flare-up, intense abdominal pain and fever may be felt. Mucus, blood, or pus may appear in the stool. Between flare ups, inflammation lessens and there usually are no symptoms. Crohn s disease is a form of inflammatory bowel disease (IBD).   Symptoms of Crohn's disease may include:    Abdominal cramps and pain    Diarrhea, usually bloody, alternating with constipation    Mucus in stools    Rectal bleeding    Rectal pain    Fever    Low energy    Decreased appetite and weight loss  No one knows what exactly causes Crohn's disease, and there is no cure. The goal of treatment is to control and relieve symptoms and prevent complications, so you can lead a full and active life. No single treatment works for everyone, but many things can be done to help.  Diet  Foods did not cause your Crohn's, but they can affect it. Unfortunately, there is no one diet that works for everyone. Below are some things to try. Keep a food log to figure out what you are sensitive to.    Eat more slowly and smaller amounts at a time, but more often. Remember, you can always eat more, but cannot eat less once you've eaten too much.    High fiber foods are complicated. While they may help constipation they can make the bloating, cramping, gas, and diarrhea worse.    Try avoiding dairy products, sometimes this helps    Try cutting out foods that are high in fat and fatty  "meats    Eat less sugar    Bloating or passing excess gas may be controlled. Be careful with \"gassy\" vegetables and fruits like beans, cabbage, broccoli, and cauliflower.    Be careful of carbonated beverages and fruit juices. They can make the bloating and diarrhea worse.    Caffeine, alcohol, and stimulants may worsen symptoms. These include coffee, tea, sodas, energy drinks, and chocolate.  Lifestyle  Although stress does not cause Crohn's, it is often a factor in flare-ups. It can also affect how you feel about and cope with your condition.    Look for factors that seem to worsen your symptoms such as stress and emotions.    Counseling can often help relieve stress. So can self-help measures such as exercise, yoga, and meditation.    Depression can be a part of this illness and antidepressants may be prescribed. This may actually help with diarrhea, constipation, and cramping, as well as depression.    Smoking doesn't cause Crohn's, but can make the symptoms worse.  Medicines  Your health care provider may prescribe medicines. If so, take them as directed. For acute flare-ups, prescription medicines can be prescribed. Contact your provider if you need this.    Ask your health care provider before taking any antidiarrheal medicines.    Avoid anti-inflammatory medicines like ibuprofen or naproxen.    Consider nutritional supplements. This is especially true if the diarrhea is prolonged, or you aren't eating or are losing weight.  Follow-up care  Follow up with your healthcare provider, or as advised. If a stool sample was taken or cultures were done, you will be told if your treatment needs to change. Call as directed for the results.  Support  Support groups for persons Crohn s disease can be a source of useful information on how others are coping with this illness. They are available in person, on the phone, or via the Internet. Contact the following resources for more information.    Crohn s and Colitis " [employer] 548.934.6039 www.ccfa.org    National Digestive Diseases Information Clearinghouse (NDDIC) 322.160.3130 www.digestive.niddk.nih.gov  When to seek medical advice  Call your health care provider right away if any of these occur:    Fever of 100.4 F (38 C) or higher, or as directed by your health care provider    Abdominal pain that doesn't get better when you take the usual measures    Mucus, pus, or blood in the stool (dark or bright red)    Repeated vomiting    Abdominal swelling and pain that doesn't go away after a few hours  Call 911  Call 911 if any of these occur:    Trouble breathing    Confusion    Extreme sleepiness or trouble waking up    Fainting or loss of consciousness    Rapid heart rate    6571-5404 [employer]. [address] [place] [address]33. All rights reserved. This information is not intended as a substitute for professional medical care. Always follow your healthcare professional's instructions.              Follow-ups after your visit        Additional Services     GASTROENTEROLOGY ADULT REF CONSULT ONLY       Preferred Location: [facility] [phone]      Please be aware that coverage of these services is subject to the terms and limitations of your health insurance plan.  Call member services at your health plan with any benefit or coverage questions.  Any procedures must be performed at a [place] facility OR coordinated by your clinic's referral office.    Please bring the following with you to your appointment:    (1) Any X-Rays, CTs or MRIs which have been performed.  Contact the facility where they were done to arrange for  prior to your scheduled appointment.    (2) List of current medications   (3) This referral request   (4) Any documents/labs given to you for this referral            GASTROENTEROLOGY ADULT REF PROCEDURE ONLY       Last Lab Result: Creatinine (mg/dL)       Date                     Value                  04/24/2017               0.60             ----------  Body mass index is 37.35 kg/(m^2).      Patient will be contacted to schedule procedure.     Please be aware that coverage of these services is subject to the terms and limitations of your health insurance plan.  Call member services at your health plan with any benefit or coverage questions.  Any procedures must be performed at a [place] facility OR coordinated by your clinic's referral office.    Please bring the following with you to your appointment:    (1) Any X-Rays, CTs or MRIs which have been performed.  Contact the facility where they were done to arrange for  prior to your scheduled appointment.    (2) List of current medications   (3) This referral request   (4) Any documents/labs given to you for this referral                  Who to contact     If you have questions or need follow up information about today's clinic visit or your schedule please contact [facility] directly at [phone].  Normal or non-critical lab and imaging results will be communicated to you by Proxeonhart, letter or phone within 4 business days after the clinic has received the results. If you do not hear from us within 7 days, please contact the clinic through Algeneti[employer] or phone. If you have a critical or abnormal lab result, we will notify you by phone as soon as possible.  Submit refill requests through [employer] or call your pharmacy and they will forward the refill request to us. Please allow 3 business days for your refill to be completed.          Additional Information About Your Visit        [employer] Information     [employer] gives you secure access to your electronic health record. If you see a primary care provider, you can also send messages to your care team and make appointments. If you have questions, please call your primary care clinic.  If you do not have a primary care provider, please call [phone] and they will assist you.        Care  "EveryWhere ID     This is your Care EveryWhere ID. This could be used by other organizations to access your [facility] medical records  [id number]        Your Vitals Were     Pulse Temperature Height Last Period Pulse Oximetry Breastfeeding?    80 97.9  F (36.6  C) (Oral) 5' 4.5\" (1.638 m) 04/14/2017 (Exact Date) 96% No    BMI (Body Mass Index)                   37.35 kg/m2            Blood Pressure from Last 3 Encounters:   05/01/17 112/74   04/24/17 122/78   02/22/17 131/81    Weight from Last 3 Encounters:   05/01/17 221 lb (100.2 kg)   04/24/17 224 lb 6.4 oz (101.8 kg)   02/22/17 214 lb (97.1 kg)              We Performed the Following     GASTROENTEROLOGY ADULT REF CONSULT ONLY     GASTROENTEROLOGY ADULT REF PROCEDURE ONLY        Primary Care Provider Office Phone # Fax #    Chapito Coleman [phone]       [facility] [address] [facility] [address]        Thank you!     Thank you for choosing [facility]  for your care. Our goal is always to provide you with excellent care. Hearing back from our patients is one way we can continue to improve our services. Please take a few minutes to complete the written survey that you may receive in the mail after your visit with us. Thank you!             Your Updated Medication List - Protect others around you: Learn how to safely use, store and throw away your medicines at www.disposemymeds.org.          This list is accurate as of: 5/1/17  3:01 PM.  Always use your most recent med list.                   Brand Name Dispense Instructions for use    fexofenadine 180 MG tablet    ALLEGRA    90 tablet    Take  by mouth. 1 TABLET DAILY FOR ALLERGIES       MULTIVITAMINS PO          OMEGA-3 FISH OIL PO          SUMAtriptan 25 MG tablet    IMITREX    9 tablet    Take 1-2 tablets (25-50 mg) by mouth at onset of headache for migraine May repeat dose in 2 hours.  Do not exceed 200 mg in 24 hours       TYLENOL 325 MG tablet "   Generic drug:  acetaminophen      Take 325-650 mg by mouth every 6 hours as needed

## 2017-05-01 NOTE — NURSING NOTE
"Chief Complaint   Patient presents with     Results     Follow-up CT and US       Initial /74 (BP Location: Right arm, Patient Position: Chair, Cuff Size: Adult Large)  Pulse 80  Temp 97.9  F (36.6  C) (Oral)  Ht 5' 4.5\" (1.638 m)  Wt 221 lb (100.2 kg)  LMP 04/14/2017 (Exact Date)  SpO2 96%  Breastfeeding? No  BMI 37.35 kg/m2 Estimated body mass index is 37.35 kg/(m^2) as calculated from the following:    Height as of this encounter: 5' 4.5\" (1.638 m).    Weight as of this encounter: 221 lb (100.2 kg).  Medication Reconciliation: complete   Nino Lyon CMA      "

## 2017-05-01 NOTE — PATIENT INSTRUCTIONS
"  Crohn s Disease    Crohn s disease is inflammation of the intestinal tract that comes and goes in  flare-ups . This is a chronic (long-term) illness. During a flare-up, intense abdominal pain and fever may be felt. Mucus, blood, or pus may appear in the stool. Between flare ups, inflammation lessens and there usually are no symptoms. Crohn s disease is a form of inflammatory bowel disease (IBD).   Symptoms of Crohn's disease may include:    Abdominal cramps and pain    Diarrhea, usually bloody, alternating with constipation    Mucus in stools    Rectal bleeding    Rectal pain    Fever    Low energy    Decreased appetite and weight loss  No one knows what exactly causes Crohn's disease, and there is no cure. The goal of treatment is to control and relieve symptoms and prevent complications, so you can lead a full and active life. No single treatment works for everyone, but many things can be done to help.  Diet  Foods did not cause your Crohn's, but they can affect it. Unfortunately, there is no one diet that works for everyone. Below are some things to try. Keep a food log to figure out what you are sensitive to.    Eat more slowly and smaller amounts at a time, but more often. Remember, you can always eat more, but cannot eat less once you've eaten too much.    High fiber foods are complicated. While they may help constipation they can make the bloating, cramping, gas, and diarrhea worse.    Try avoiding dairy products, sometimes this helps    Try cutting out foods that are high in fat and fatty meats    Eat less sugar    Bloating or passing excess gas may be controlled. Be careful with \"gassy\" vegetables and fruits like beans, cabbage, broccoli, and cauliflower.    Be careful of carbonated beverages and fruit juices. They can make the bloating and diarrhea worse.    Caffeine, alcohol, and stimulants may worsen symptoms. These include coffee, tea, sodas, energy drinks, and chocolate.  Lifestyle  Although stress " does not cause Crohn's, it is often a factor in flare-ups. It can also affect how you feel about and cope with your condition.    Look for factors that seem to worsen your symptoms such as stress and emotions.    Counseling can often help relieve stress. So can self-help measures such as exercise, yoga, and meditation.    Depression can be a part of this illness and antidepressants may be prescribed. This may actually help with diarrhea, constipation, and cramping, as well as depression.    Smoking doesn't cause Crohn's, but can make the symptoms worse.  Medicines  Your health care provider may prescribe medicines. If so, take them as directed. For acute flare-ups, prescription medicines can be prescribed. Contact your provider if you need this.    Ask your health care provider before taking any antidiarrheal medicines.    Avoid anti-inflammatory medicines like ibuprofen or naproxen.    Consider nutritional supplements. This is especially true if the diarrhea is prolonged, or you aren't eating or are losing weight.  Follow-up care  Follow up with your healthcare provider, or as advised. If a stool sample was taken or cultures were done, you will be told if your treatment needs to change. Call as directed for the results.  Support  Support groups for persons Crohn s disease can be a source of useful information on how others are coping with this illness. They are available in person, on the phone, or via the Internet. Contact the following resources for more information.    Crohn s and Colitis Foundation of [employer] 321.301.3763 www.ccfa.org    National Digestive Diseases Information Clearinghouse (NDDIC) 960.333.7790 www.digestive.niddk.nih.gov  When to seek medical advice  Call your health care provider right away if any of these occur:    Fever of 100.4 F (38 C) or higher, or as directed by your health care provider    Abdominal pain that doesn't get better when you take the usual measures    Mucus, pus, or  blood in the stool (dark or bright red)    Repeated vomiting    Abdominal swelling and pain that doesn't go away after a few hours  Call 911  Call 911 if any of these occur:    Trouble breathing    Confusion    Extreme sleepiness or trouble waking up    Fainting or loss of consciousness    Rapid heart rate    4991-5416 [employer]. [address] [place] [address]152. All rights reserved. This information is not intended as a substitute for professional medical care. Always follow your healthcare professional's instructions.

## 2017-05-01 NOTE — PROGRESS NOTES
SUBJECTIVE:                                                    Siri Vyas is a 46 year old female who presents to clinic today for the following health issues:      Follow-up CT and US results-Continued abdominal pain.    Abdominal Pain      Duration: 1 month    Description (location/character/radiation): left upper quadrant burning sharp and aching, cramping. Almost bad enough 3 days ago to go to emergency department. Was 10/10 pain and now down to 5/10.        Associated flank pain: slight left flank pain but mostly in the front.    Intensity:  severe    Accompanying signs and symptoms:        Fever/Chills: no        Gas/Bloating: no        Nausea/vomitting: YES       Diarrhea: YES       Dysuria or Hematuria: no     History (previous similar pain/trauma/previous testing): previous left upper quadrant abdominal pain. This episode has had us of abdomen and pelvis, CT abdomen with no significant findings or cause of abdominal pain.     Precipitating or alleviating factors:       Pain worse with eating/BM/urination: no       Pain relieved by BM: no     Therapies tried and outcome: None    LMP:  not applicable       Problem list and histories reviewed & adjusted, as indicated.  Additional history: as documented    Patient Active Problem List   Diagnosis     Seasonal allergic rhinitis     CARDIOVASCULAR SCREENING; LDL GOAL LESS THAN 160     S/P cholecystectomy     Dermoid cyst of ovary     H/O:  section     LUQ abdominal pain     Paresthesias/numbness     Median neuropathy     Epicondylitis, medial humeral     Tendinitis of left wrist     Headache     Chondromalacia, knee, left     Obesity, Class II, BMI 35-39.9 (H)     Past Surgical History:   Procedure Laterality Date     Dermoid cyst  (OVARY) removal       GYN SURGERY  ,,,          LAPAROSCOPIC CHOLECYSTECTOMY  2001     SINUS SURGERY  2002    Nasls sinuses     TONSILLECTOMY  1999       Social History   Substance Use  Topics     Smoking status: Never Smoker     Smokeless tobacco: Never Used      Comment: no second hand smoke     Alcohol use Yes      Comment: 1-2 days in a week. Each time 1-2 beers. 0-5 beers weeks     Family History   Problem Relation Age of Onset     Breast Cancer Mother      Lipids Mother      Hypertension Mother      Hearing Loss Mother      Lipids Brother      CEREBROVASCULAR DISEASE Maternal Grandmother 76     Neurologic Disorder Son 14     migraines         Current Outpatient Prescriptions   Medication Sig Dispense Refill     Omega-3 Fatty Acids (OMEGA-3 FISH OIL PO)        Multiple Vitamin (MULTIVITAMINS PO)        SUMAtriptan (IMITREX) 25 MG tablet Take 1-2 tablets (25-50 mg) by mouth at onset of headache for migraine May repeat dose in 2 hours.  Do not exceed 200 mg in 24 hours 9 tablet 5     acetaminophen (TYLENOL) 325 MG tablet Take 325-650 mg by mouth every 6 hours as needed       fexofenadine (ALLEGRA) 180 MG tablet Take  by mouth. 1 TABLET DAILY FOR ALLERGIES 90 tablet 0     Allergies   Allergen Reactions     Dust Mites      No Clinical Screening - See Comments Other (See Comments)     No Known Drug Allergy      Seasonal Allergies      Recent Labs   Lab Test  04/24/17   1622  01/21/16   1427  04/11/12   1224   LDL  80   --    --    HDL  60   --    --    TRIG  316*   --    --    ALT  34  26  18   CR  0.60  0.78  0.59   GFRESTIMATED  >90  Non  GFR Calc    80  >90   GFRESTBLACK  >90   GFR Calc    >90   GFR Calc    >90   POTASSIUM  3.9  3.5  3.6   TSH   --    --   1.72      BP Readings from Last 3 Encounters:   05/01/17 112/74   04/24/17 122/78   02/22/17 131/81    Wt Readings from Last 3 Encounters:   05/01/17 221 lb (100.2 kg)   04/24/17 224 lb 6.4 oz (101.8 kg)   02/22/17 214 lb (97.1 kg)                  Labs reviewed in EPIC    Reviewed and updated as needed this visit by clinical staff  Tobacco  Allergies  Meds       Reviewed and updated as needed  "this visit by Provider         ROS:  Constitutional, HEENT, cardiovascular, pulmonary, gi and gu systems are negative, except as otherwise noted.    OBJECTIVE:                                                    /74 (BP Location: Right arm, Patient Position: Chair, Cuff Size: Adult Large)  Pulse 80  Temp 97.9  F (36.6  C) (Oral)  Ht 5' 4.5\" (1.638 m)  Wt 221 lb (100.2 kg)  LMP 04/14/2017 (Exact Date)  SpO2 96%  Breastfeeding? No  BMI 37.35 kg/m2  Body mass index is 37.35 kg/(m^2).  GENERAL: healthy, alert, well nourished, well hydrated, no distress, obese  HENT: ear canals- normal; TMs- normal; Nose- normal; Mouth- no ulcers, no lesions, throat is clear with no erythema or exudate.   NECK: no tenderness, no adenopathy, no asymmetry, no masses, no stiffness; thyroid- normal to palpation  RESP: lungs clear to auscultation - no rales, no rhonchi, no wheezes  CV: regular rates and rhythm, normal S1 S2, no S3 or S4 and no murmur, no click or rub -  ABDOMEN: soft,  Tenderness in left upper quadrant without rebound, no  hepatosplenomegaly, no masses, normal bowel sounds  MS: extremities- no gross deformities noted, no edema  SKIN: no suspicious lesions, no rashes  NEURO: strength and tone- normal, sensory exam- grossly normal, mentation- intact, speech- normal, reflexes- symmetric  BACK: no CVA tenderness, no paralumbar tenderness  PSYCH: Alert and oriented times 3; speech- coherent , normal rate and volume; able to articulate logical thoughts, able to abstract reason, no tangential thoughts, no hallucinations or delusions, affect- normal     Diagnostic Test Results:  Results for orders placed or performed in visit on 04/28/17   CT Abdomen Pelvis w Contrast    Narrative    EXAMINATION: CT ABDOMEN PELVIS W CONTRAST, 4/28/2017 3:06 PM    TECHNIQUE:  Helical CT images from the lung bases through the  symphysis pubis were obtained  with IV contrast. Contrast dose: 135ml  isovue 370    COMPARISON: Left upper " "quadrant abdominal pain.    HISTORY: evaluate for pain in abdomen, Left upper quadrant pain,  Unspecified abdominal pain    FINDINGS:    Lung bases are clear. No pericardial effusion.    The spleen, liver, kidneys, adrenal glands, pancreas are unremarkable.  Simple appearing right ovarian cyst measures 2 cm, otherwise no  adnexal mass. Changes of tubal ligation. Normal uterus. Normal urinary  bladder. No pathologically enlarged abdominal lymph nodes.  Cholecystectomy changes. No free pelvic fluid or free air. Diastases  rectus. No bowel obstruction. Normal appendix. No suspicious lesion  the bones. Major vascular structures in the abdomen are patent.      Impression    IMPRESSION: Cholecystectomy changes. No finding to explain abdominal  pain.     I have personally reviewed the examination and initial interpretation  and I agree with the findings.    KYLE OLSON MD        ASSESSMENT/PLAN:                                                        Tobacco Cessation:   reports that she has never smoked. She has never used smokeless tobacco.      BMI:   Estimated body mass index is 37.35 kg/(m^2) as calculated from the following:    Height as of this encounter: 5' 4.5\" (1.638 m).    Weight as of this encounter: 221 lb (100.2 kg).   Weight management plan: Discussed healthy diet and exercise guidelines and patient will follow up in 3 months in clinic to re-evaluate.        ICD-10-CM    1. Abdominal pain, left upper quadrant R10.12 GASTROENTEROLOGY ADULT REF PROCEDURE ONLY- colonoscopy as soon as possible      GASTROENTEROLOGY ADULT REF CONSULT ONLY- consult for persistent abdominal pain   2. Inflammation of large intestine K52.9 GASTROENTEROLOGY ADULT REF CONSULT ONLY   3. S/P cholecystectomy Z90.49 No common bile duct stones   4. Obesity, Class II, BMI 35-39.9 (H) E66.01 Weight loss counseling done, eat low fiber for now       FURTHER TESTING:       - colonoscopy  CONSULTATION/REFERRAL to gastroenterology for " further evaluation and treatment   See Patient Instructions    Yamileth Mensah MD  [facility]

## 2017-05-02 ENCOUNTER — MYC MEDICAL ADVICE (OUTPATIENT)
Dept: FAMILY MEDICINE | Facility: CLINIC | Age: 47
End: 2017-05-02

## 2017-05-02 NOTE — TELEPHONE ENCOUNTER
Both referral, Endoscopy and Colonoscopy and demographic face sheet is printed and faxed to MAME Sanchez at fax # [phone] with a note to call pt to schedule both procedure.  King Flores,  For [facility]

## 2017-05-02 NOTE — TELEPHONE ENCOUNTER
Can someone check on the referral status for Siri's colonoscopy and [place] [facility] referral? She will be having both done there for expediency.  Yamileth Mensah MD

## 2017-05-04 ENCOUNTER — TRANSFERRED RECORDS (OUTPATIENT)
Dept: HEALTH INFORMATION MANAGEMENT | Facility: CLINIC | Age: 47
End: 2017-05-04

## 2017-05-11 ENCOUNTER — TRANSFERRED RECORDS (OUTPATIENT)
Dept: HEALTH INFORMATION MANAGEMENT | Facility: CLINIC | Age: 47
End: 2017-05-11

## 2017-08-25 ENCOUNTER — RADIANT APPOINTMENT (OUTPATIENT)
Dept: GENERAL RADIOLOGY | Facility: CLINIC | Age: 47
End: 2017-08-25
Attending: INTERNAL MEDICINE
Payer: COMMERCIAL

## 2017-08-25 ENCOUNTER — OFFICE VISIT (OUTPATIENT)
Dept: FAMILY MEDICINE | Facility: CLINIC | Age: 47
End: 2017-08-25
Payer: COMMERCIAL

## 2017-08-25 VITALS
BODY MASS INDEX: 37.65 KG/M2 | OXYGEN SATURATION: 95 % | HEIGHT: 65 IN | HEART RATE: 79 BPM | WEIGHT: 226 LBS | SYSTOLIC BLOOD PRESSURE: 109 MMHG | DIASTOLIC BLOOD PRESSURE: 67 MMHG | TEMPERATURE: 98 F

## 2017-08-25 DIAGNOSIS — M25.461 EFFUSION OF RIGHT KNEE: ICD-10-CM

## 2017-08-25 DIAGNOSIS — M25.561 PAIN AND SWELLING OF RIGHT KNEE: Primary | ICD-10-CM

## 2017-08-25 DIAGNOSIS — M25.461 PAIN AND SWELLING OF RIGHT KNEE: Primary | ICD-10-CM

## 2017-08-25 PROCEDURE — 99214 OFFICE O/P EST MOD 30 MIN: CPT | Performed by: INTERNAL MEDICINE

## 2017-08-25 PROCEDURE — 73562 X-RAY EXAM OF KNEE 3: CPT | Mod: RT

## 2017-08-25 RX ORDER — HYDROCODONE BITARTRATE AND IBUPROFEN 7.5; 2 MG/1; MG/1
1 TABLET, FILM COATED ORAL DAILY PRN
Qty: 30 TABLET | Refills: 0 | Status: SHIPPED | OUTPATIENT
Start: 2017-08-25 | End: 2017-11-01

## 2017-08-25 RX ORDER — METHYLPREDNISOLONE 4 MG
TABLET, DOSE PACK ORAL
Qty: 21 TABLET | Refills: 2 | Status: SHIPPED | OUTPATIENT
Start: 2017-08-25 | End: 2018-07-16

## 2017-08-25 NOTE — PATIENT INSTRUCTIONS
This summary includes the important diagnoses, test, medications and other important parts of your medical history.  Below are a few good we sites you can use to learn more about these.     Www.TheySay.org : Up to date and easily searchable information on multiple topics.  Www.TheySay.org/Pharmacy/c_539084.asp : [facility] $4.99 medications  Www.medlineplus.gov : medication info, interactive tutorials, watch real surgeries online  Www.familydoctor.org : good info from the Academy of Family Physicians  Www.mayoFlowPayinic.com : good info from the [facility]  Www.cdc.gov : public health info, travel advisories, epidemics (H1N1)  Www.aap.org : children's health info, normal development, vaccinations  Www.health.[facility].[place].us : [place] dept of heat, public health issues in [place], N1N1    Based on your medical history and these are the current health maintenance or preventive care services that you are due for (some may have been done at this visit:)  Health Maintenance Due   Topic Date Due     MAMMO Q2 YR  04/27/2017     =================================================================================  Normal Values   Blood pressure  <140/90 for most adults    <130/80 for some chronic diseases (ask your care team about yours)    BMI (body mass index)  18.5-25 kg/m2 (based on height and weight)     Thank you for visiting [facility]    Normal or non-critical lab and imaging results will be communicated to you by MyChart, letter or phone within 7 days.  If you do not hear from us within 10 days, please call the clinic. If you have a critical or abnormal lab result, we will notify you by phone as soon as possible.     If you have any questions regarding your visit please contact:     Team Comfort:   Clinic Hours Telephone Number   Dr. Benito Fabian   7am-5pm  Monday - Friday [phone]  [facility]   Pharmacy 8am-8pm  Monday-Thursday      8am-6pm Friday  9am-5pm Saturday-Sunday [phone]   Urgent Care 11am-8pm Monday-Friday        9am-5pm Saturday-Sunday [phone]     After hours, weekend or if you need to make an appointment with your primary provider please call [phone].   After Hours nurse advise: call [facility]: [phone]    Medication Refills:  Call your pharmacy and they will forward the refill to us. Please allow 3 business days for your refills to be completed.    Use Mobile Roadie (secure email communication and access to your chart) to send your primary care provider a message or make an appointment. Ask someone on your Team how to sign up for Mobile Roadie. To log on to [employer] or for more information in 10Six please visit the website at [email or web address].  As of October 8, 2013, all password changes, disabled accounts, or ID changes in Mobile Roadie/MyHealth will be done by our Access Services Department.   If you need help with your account or password, call: [phone]. Clinic staff no longer has the ability to change passwords.

## 2017-08-25 NOTE — MR AVS SNAPSHOT
After Visit Summary   8/25/2017    Siri Vyas    MRN: [id number]           Patient Information     Date Of Birth          1970        Visit Information        Provider Department      8/25/2017 11:00 AM Nathaniel Perez MD [facility]        Today's Diagnoses     Pain and swelling of right knee    -  1    Effusion of right knee          Care Instructions    This summary includes the important diagnoses, test, medications and other important parts of your medical history.  Below are a few good we sites you can use to learn more about these.     Www.[employer].IgnitAd : Up to date and easily searchable information on multiple topics.  Www.[employer].IgnitAd/Pharmacy/c_539084.asp : [facility] $4.99 medications  Www.[employer].gov : medication info, interactive tutorials, watch real surgeries online  Www.familydoctor.org : good info from the Academy of Family Physicians  Www.[employer].Curse : good info from the [facility]  Www.cdc.gov : public health info, travel advisories, epidemics (H1N1)  Www.aap.org : children's health info, normal development, vaccinations  Www.health.[facility].[place].us : [place] dept of heatlh, public health issues in [place], N1N1    Based on your medical history and these are the current health maintenance or preventive care services that you are due for (some may have been done at this visit:)  Health Maintenance Due   Topic Date Due     MAMMO Q2 YR  04/27/2017     =================================================================================  Normal Values   Blood pressure  <140/90 for most adults    <130/80 for some chronic diseases (ask your care team about yours)    BMI (body mass index)  18.5-25 kg/m2 (based on height and weight)     Thank you for visiting [facility]    Normal or non-critical lab and imaging results will be communicated to you by MyChart, letter or phone within 7 days.  If you do not hear from us within 10 days, please  call the clinic. If you have a critical or abnormal lab result, we will notify you by phone as soon as possible.     If you have any questions regarding your visit please contact:     Team Comfort:   Clinic Hours Telephone Number   Dr. Benito Ramirez  [facility]   7am-5pm  Monday - Friday [phone]  [facility]   Pharmacy 8am-8pm Monday-Thursday      8am-6pm Friday  9am-5pm Saturday-Sunday [phone]   Urgent Care 11am-8pm Monday-Friday        9am-5pm Saturday-Sunday [phone]     After hours, weekend or if you need to make an appointment with your primary provider please call [phone].   After Hours nurse advise: call [facility]: [phone]    Medication Refills:  Call your pharmacy and they will forward the refill to us. Please allow 3 business days for your refills to be completed.    Use Haileo (secure email communication and access to your chart) to send your primary care provider a message or make an appointment. Ask someone on your Team how to sign up for Haileo. To log on to Karmarama or for more information in Weplay please visit the website at [email or web address].  As of October 8, 2013, all password changes, disabled accounts, or ID changes in Haileo/MyHealth will be done by our Access Services Department.   If you need help with your account or password, call: [phone]. Clinic staff no longer has the ability to change passwords.             Follow-ups after your visit        Additional Services     ORTHOPEDICS ADULT REFERRAL       Your provider has referred you to: [facility]: [facility] [phone]    [email or web address]    Please be aware that coverage of these services is subject to the terms and limitations of your health insurance plan.  Call member services at your health plan with any benefit or coverage questions.      Please bring the  "following to your appointment:    >>   Any x-rays, CTs or MRIs which have been performed.  Contact the facility where they were done to arrange for  prior to your scheduled appointment.    >>   List of current medications   >>   This referral request   >>   Any documents/labs given to you for this referral                  Future tests that were ordered for you today     Open Future Orders        Priority Expected Expires Ordered    MR Knee Right w/o Contrast Routine  8/25/2018 8/25/2017            Who to contact     If you have questions or need follow up information about today's clinic visit or your schedule please contact [facility] directly at [phone].  Normal or non-critical lab and imaging results will be communicated to you by MyChart, letter or phone within 4 business days after the clinic has received the results. If you do not hear from us within 7 days, please contact the clinic through Gravitonhart or phone. If you have a critical or abnormal lab result, we will notify you by phone as soon as possible.  Submit refill requests through UrbanFarmers or call your pharmacy and they will forward the refill request to us. Please allow 3 business days for your refill to be completed.          Additional Information About Your Visit        GravitonharTwinStrata Information     UrbanFarmers gives you secure access to your electronic health record. If you see a primary care provider, you can also send messages to your care team and make appointments. If you have questions, please call your primary care clinic.  If you do not have a primary care provider, please call [phone] and they will assist you.        Care EveryWhere ID     This is your Care EveryWhere ID. This could be used by other organizations to access your [facility] medical records  [id number]        Your Vitals Were     Pulse Temperature Height Pulse Oximetry BMI (Body Mass Index)       79 98  F (36.7  C) (Oral) 5' 4.5\" (1.638 m) 95% 38.19 " kg/m2        Blood Pressure from Last 3 Encounters:   08/25/17 109/67   05/01/17 112/74   04/24/17 122/78    Weight from Last 3 Encounters:   08/25/17 226 lb (102.5 kg)   05/01/17 221 lb (100.2 kg)   04/24/17 224 lb 6.4 oz (101.8 kg)              We Performed the Following     ORTHOPEDICS ADULT REFERRAL     XR Knee Right 3 Views          Today's Medication Changes          These changes are accurate as of: 8/25/17 11:48 AM.  If you have any questions, ask your nurse or doctor.               Start taking these medicines.        Dose/Directions    HYDROcodone-ibuprofen 7.5-200 MG per tablet   Commonly known as:  VICOPROFEN   Used for:  Pain and swelling of right knee   Started by:  Nathaniel Perez MD        Dose:  1 tablet   Take 1 tablet by mouth daily as needed for moderate to severe pain   Quantity:  30 tablet   Refills:  0       methylPREDNISolone 4 MG tablet   Commonly known as:  MEDROL DOSEPAK   Used for:  Pain and swelling of right knee   Started by:  Nathaniel Perez MD        Follow package instructions   Quantity:  21 tablet   Refills:  2            Where to get your medicines      These medications were sent to [facility] - [place], [place] - [address]  [address], [facility] [address]     Phone:  [phone]     methylPREDNISolone 4 MG tablet         Some of these will need a paper prescription and others can be bought over the counter.  Ask your nurse if you have questions.     Bring a paper prescription for each of these medications     HYDROcodone-ibuprofen 7.5-200 MG per tablet                Primary Care Provider Office Phone # Fax #    Chapito Coleman [phone]       [address]  [facility] [address]        Equal Access to Services     [facility] : Jewell Aggarwal, chriss lópez, qaybta kaalfacundo young. So [facility] [phone].    ATENCIÓN: Si lukas vazquez brandt  disposición servicios gratuitos de asistencia lingüística. Concepción peck [phone].    We comply with applicable federal civil rights laws and [place] laws. We do not discriminate on the basis of race, color, national origin, age, disability sex, sexual orientation or gender identity.            Thank you!     Thank you for choosing [facility]  for your care. Our goal is always to provide you with excellent care. Hearing back from our patients is one way we can continue to improve our services. Please take a few minutes to complete the written survey that you may receive in the mail after your visit with us. Thank you!             Your Updated Medication List - Protect others around you: Learn how to safely use, store and throw away your medicines at www.disposemymeds.org.          This list is accurate as of: 8/25/17 11:48 AM.  Always use your most recent med list.                   Brand Name Dispense Instructions for use Diagnosis    fexofenadine 180 MG tablet    ALLEGRA    90 tablet    Take  by mouth. 1 TABLET DAILY FOR ALLERGIES    Seasonal allergic rhinitis       GLUCOSAMINE-CHONDROITIN PO           HYDROcodone-ibuprofen 7.5-200 MG per tablet    VICOPROFEN    30 tablet    Take 1 tablet by mouth daily as needed for moderate to severe pain    Pain and swelling of right knee       methylPREDNISolone 4 MG tablet    MEDROL DOSEPAK    21 tablet    Follow package instructions    Pain and swelling of right knee       MULTIVITAMINS PO           OMEGA-3 FISH OIL PO           SUMAtriptan 25 MG tablet    IMITREX    9 tablet    Take 1-2 tablets (25-50 mg) by mouth at onset of headache for migraine May repeat dose in 2 hours.  Do not exceed 200 mg in 24 hours    Headache(784.0)       TYLENOL 325 MG tablet   Generic drug:  acetaminophen      Take 325-650 mg by mouth every 6 hours as needed

## 2017-08-25 NOTE — PROGRESS NOTES
SUBJECTIVE:   Siri Vyas is a 46 year old female who presents to clinic today for the following health issues:    Joint Pain    Onset: 1.5 months    Description:   Location: right knee  Character: unexplainable     Intensity: moderate, severe    Progression of Symptoms: same    Accompanying Signs & Symptoms:  Other symptoms: swelling    History:   Previous similar pain: YES      Precipitating factors:   Trauma or overuse: no     Alleviating factors:  Improved by: nothing  Therapies Tried and outcome: tylenol and ibuprofen, no relief          Problem list and histories reviewed & adjusted, as indicated.  Additional history: as documented    Patient Active Problem List   Diagnosis     Seasonal allergic rhinitis     CARDIOVASCULAR SCREENING; LDL GOAL LESS THAN 160     S/P cholecystectomy     Dermoid cyst of ovary     H/O:  section     LUQ abdominal pain     Paresthesias/numbness     Median neuropathy     Epicondylitis, medial humeral     Tendinitis of left wrist     Headache     Chondromalacia, knee, left     Obesity, Class II, BMI 35-39.9     Chondromalacia patellae, right     Past Surgical History:   Procedure Laterality Date     Dermoid cyst  (OVARY) removal       GYN SURGERY  ,,,          LAPAROSCOPIC CHOLECYSTECTOMY       SINUS SURGERY  2002    Nasls sinuses     TONSILLECTOMY  1999       Social History   Substance Use Topics     Smoking status: Never Smoker     Smokeless tobacco: Never Used      Comment: no second hand smoke     Alcohol use Yes      Comment: 1-2 days in a week. Each time 1-2 beers. 0-5 beers weeks     Family History   Problem Relation Age of Onset     Breast Cancer Mother      Lipids Mother      Hypertension Mother      Hearing Loss Mother      Lipids Brother      CEREBROVASCULAR DISEASE Maternal Grandmother 76     Neurologic Disorder Son 14     migraines         Allergies   Allergen Reactions     Dust Mites      No Clinical Screening - See  "Comments Other (See Comments)     No Known Drug Allergy      Seasonal Allergies      Recent Labs   Lab Test  04/24/17   1622  01/21/16   1427  04/11/12   1224   LDL  80   --    --    HDL  60   --    --    TRIG  316*   --    --    ALT  34  26  18   CR  0.60  0.78  0.59   GFRESTIMATED  >90  Non  GFR Calc    80  >90   GFRESTBLACK  >90   GFR Calc    >90   GFR Calc    >90   POTASSIUM  3.9  3.5  3.6   TSH   --    --   1.72      BP Readings from Last 3 Encounters:   08/25/17 109/67   05/01/17 112/74   04/24/17 122/78    Wt Readings from Last 3 Encounters:   08/31/17 224 lb (101.6 kg)   08/25/17 226 lb (102.5 kg)   05/01/17 221 lb (100.2 kg)            ROS:  C: NEGATIVE for fever, chills, change in weight  I: NEGATIVE for worrisome rashes, moles or lesions  E: NEGATIVE for vision changes or irritation  E/M: NEGATIVE for ear, mouth and throat problems  R: NEGATIVE for significant cough or SOB  CV: NEGATIVE for chest pain, palpitations or peripheral edema  GI: NEGATIVE for nausea, abdominal pain, heartburn, or change in bowel habits  : NEGATIVE for frequency, dysuria, or hematuria  M: NEGATIVE for significant arthralgias or myalgia  N: NEGATIVE for weakness, dizziness or paresthesias  E: NEGATIVE for temperature intolerance, skin/hair changes  H: NEGATIVE for bleeding problems  P: NEGATIVE for changes in mood or affect    OBJECTIVE:     /67 (BP Location: Left arm, Patient Position: Chair, Cuff Size: Adult Small)  Pulse 79  Temp 98  F (36.7  C) (Oral)  Ht 5' 4.5\" (1.638 m)  Wt 226 lb (102.5 kg)  SpO2 95%  BMI 38.19 kg/m2  Body mass index is 38.19 kg/(m^2).  GENERAL: healthy, alert and no distress  EYES: Eyes grossly normal to inspection, PERRL and conjunctivae and sclerae normal  HENT: ear canals and TM's normal, nose and mouth without ulcers or lesions  NECK: no adenopathy, no asymmetry, masses, or scars and thyroid normal to palpation  RESP: lungs clear to " auscultation - no rales, rhonchi or wheezes  CV: regular rate and rhythm, normal S1 S2, no S3 or S4, no murmur, click or rub, no peripheral edema and peripheral pulses strong  ABDOMEN: soft, nontender, no hepatosplenomegaly, no masses and bowel sounds normal  MS: no gross musculoskeletal defects noted, no edema  SKIN: no suspicious lesions or rashes  NEURO: Normal strength and tone, mentation intact and speech normal  PSYCH: mentation appears normal, affect normal/bright    Diagnostic Test Results:  Results for orders placed or performed in visit on 08/25/17   XR Knee Right 3 Views    Narrative    XR KNEE RT 3 VW 8/25/2017 11:55 AM    HISTORY: Right knee joint effusion.    COMPARISON: 12/27/2013    FINDINGS: Moderate narrowing of medial tibiofemoral compartment joint  space. No significant joint effusion. Small marginal osteophytes. No  fracture or malalignment.      Impression    IMPRESSION: Mild degenerative change. No significant joint effusion.    ANA DOW MD       ASSESSMENT/PLAN:     (M25.561,  M25.461) Pain and swelling of right knee  (primary encounter diagnosis)  Comment: Suspect medial meniscus tear, causing rigth knee effusion.  Plan: XR Knee Right 3 Views, methylPREDNISolone         (MEDROL DOSEPAK) 4 MG tablet, ORTHOPEDICS ADULT        REFERRAL, HYDROcodone-ibuprofen (VICOPROFEN)         7.5-200 MG per tablet, MR Knee Right w/o         Contrast            (M25.461) Effusion of right knee  Comment:   Plan: ORTHOPEDICS ADULT REFERRAL, MR Knee Right w/o         Contrast          Follow-up visit if condition worsens.      Nathaniel Perez MD  [facility]

## 2017-08-25 NOTE — NURSING NOTE
"Chief Complaint   Patient presents with     Musculoskeletal Problem     right knee       Initial /67 (BP Location: Left arm, Patient Position: Chair, Cuff Size: Adult Small)  Pulse 79  Temp 98  F (36.7  C) (Oral)  Ht 5' 4.5\" (1.638 m)  Wt 226 lb (102.5 kg)  SpO2 95%  BMI 38.19 kg/m2 Estimated body mass index is 38.19 kg/(m^2) as calculated from the following:    Height as of this encounter: 5' 4.5\" (1.638 m).    Weight as of this encounter: 226 lb (102.5 kg).  Medication Reconciliation: complete     Orion Rangel MA      "

## 2017-08-28 ENCOUNTER — RADIANT APPOINTMENT (OUTPATIENT)
Dept: MRI IMAGING | Facility: CLINIC | Age: 47
End: 2017-08-28
Attending: INTERNAL MEDICINE
Payer: COMMERCIAL

## 2017-08-28 DIAGNOSIS — M25.461 EFFUSION OF RIGHT KNEE: ICD-10-CM

## 2017-08-28 DIAGNOSIS — M25.561 PAIN AND SWELLING OF RIGHT KNEE: ICD-10-CM

## 2017-08-28 DIAGNOSIS — M25.461 PAIN AND SWELLING OF RIGHT KNEE: ICD-10-CM

## 2017-08-28 PROCEDURE — 73721 MRI JNT OF LWR EXTRE W/O DYE: CPT | Mod: TC

## 2017-08-31 ENCOUNTER — OFFICE VISIT (OUTPATIENT)
Dept: ORTHOPEDICS | Facility: CLINIC | Age: 47
End: 2017-08-31
Payer: COMMERCIAL

## 2017-08-31 VITALS — BODY MASS INDEX: 37.32 KG/M2 | RESPIRATION RATE: 18 BRPM | HEIGHT: 65 IN | WEIGHT: 224 LBS | TEMPERATURE: 98.7 F

## 2017-08-31 DIAGNOSIS — M22.41 CHONDROMALACIA PATELLAE, RIGHT: Primary | ICD-10-CM

## 2017-08-31 PROCEDURE — 99243 OFF/OP CNSLTJ NEW/EST LOW 30: CPT | Performed by: ORTHOPAEDIC SURGERY

## 2017-08-31 NOTE — NURSING NOTE
"Chief Complaint   Patient presents with     RECHECK     New issue. Right knee pain for the last 5 weeks. No injury. Pain level 3-4/10 sharp, dull, achy, shooting and constant. Methylpredinsolone makes the pain better and walking, stairs, standing for long periods makes the pain worse.       Initial Temp 98.7  F (37.1  C)  Resp 18  Ht 1.638 m (5' 4.5\")  Wt 101.6 kg (224 lb)  BMI 37.86 kg/m2 Estimated body mass index is 37.86 kg/(m^2) as calculated from the following:    Height as of this encounter: 1.638 m (5' 4.5\").    Weight as of this encounter: 101.6 kg (224 lb).  Medication Reconciliation: complete   Sangita Lauren MA      "

## 2017-08-31 NOTE — LETTER
2017         RE: Siri Vyas  [address] [place] [address]        Dear Colleague,    Thank you for referring your patient, Siri Vyas, to the [facility]. Please see a copy of my visit note below.    Siri Vyas is a 46 year old female who is seen in consultation at the request of Dr. Nathaniel Perez  for right knee pain.  She has had pain and swelling for 5 weeks.  No history of injury.  Worse pain with stairs, standing, walking.  Rates her pain at 3-4/10.  Has sharp, dull, aching, shooting pain.    MRI shows chondromalacia patella with fissuring and thinning.    Past Medical History:   Diagnosis Date     Anxiety disorder      AR (allergic rhinitis)      Depression with anxiety 10/2006     High cholesterol 10/2005     PE (pulmonary embolism) 2007       Past Surgical History:   Procedure Laterality Date     Dermoid cyst  (OVARY) removal       GYN SURGERY  ,,,          LAPAROSCOPIC CHOLECYSTECTOMY       SINUS SURGERY  2002    Nasls sinuses     TONSILLECTOMY  1999       Family History   Problem Relation Age of Onset     Breast Cancer Mother      Lipids Mother      Hypertension Mother      Hearing Loss Mother      Lipids Brother      CEREBROVASCULAR DISEASE Maternal Grandmother 76     Neurologic Disorder Son 14     migraines       Social History     Social History     Marital status:      Spouse name: Jose Armando     Number of children: 4     Years of education: 16     Occupational History     [employer]     Social History Main Topics     Smoking status: Never Smoker     Smokeless tobacco: Never Used      Comment: no second hand smoke     Alcohol use Yes      Comment: 1-2 days in a week. Each time 1-2 beers. 0-5 beers weeks     Drug use: No     Sexual activity: Yes     Partners: Male     Other Topics Concern     Not on file     Social History Narrative       Current Outpatient Prescriptions  "  Medication Sig Dispense Refill     GLUCOSAMINE-CHONDROITIN PO        methylPREDNISolone (MEDROL DOSEPAK) 4 MG tablet Follow package instructions 21 tablet 2     HYDROcodone-ibuprofen (VICOPROFEN) 7.5-200 MG per tablet Take 1 tablet by mouth daily as needed for moderate to severe pain 30 tablet 0     Omega-3 Fatty Acids (OMEGA-3 FISH OIL PO)        Multiple Vitamin (MULTIVITAMINS PO)        SUMAtriptan (IMITREX) 25 MG tablet Take 1-2 tablets (25-50 mg) by mouth at onset of headache for migraine May repeat dose in 2 hours.  Do not exceed 200 mg in 24 hours 9 tablet 5     acetaminophen (TYLENOL) 325 MG tablet Take 325-650 mg by mouth every 6 hours as needed       fexofenadine (ALLEGRA) 180 MG tablet Take  by mouth. 1 TABLET DAILY FOR ALLERGIES 90 tablet 0       Allergies   Allergen Reactions     Dust Mites      No Clinical Screening - See Comments Other (See Comments)     No Known Drug Allergy      Seasonal Allergies        REVIEW OF SYSTEMS:  CONSTITUTIONAL:  NEGATIVE for fever, chills, change in weight, not feeling tired  SKIN:  NEGATIVE for worrisome rashes, no skin lumps, no skin ulcers and no non-healing wounds  EYES:  NEGATIVE for vision changes or irritation.  ENT/MOUTH:  NEGATIVE.  No hearing loss, no hoarseness, no difficulty swallowing.  RESP:  NEGATIVE. No cough or shortness of breath.  CV:  NEGATIVE for chest pain, palpitations or peripheral edema  GI:  NEGATIVE for nausea, abdominal pain, heartburn, or change in bowel habits  :  Negative. No dysuria, no hematuria  MUSCULOSKELETAL:  See HPI above  NEURO:  NEGATIVE . No headaches, no dizziness,  no numbness  ENDOCRINE:  NEGATIVE for temperature intolerance, skin/hair changes  HEME/ALLERGY/IMMUNE:  NEGATIVE for bleeding problems  PSYCHIATRIC:  NEGATIVE. no anxiety, no depression.      Exam:  Vitals: Temp 98.7  F (37.1  C)  Resp 18  Ht 1.638 m (5' 4.5\")  Wt 101.6 kg (224 lb)  BMI 37.86 kg/m2  BMI= Body mass index is 37.86 kg/(m^2).  Constitutional:  " healthy, alert and no distress  Neuro: Alert and Oriented x 3, Gait normal. Sensation grossly WNL.  HEENT:  Atraumatic, EOMI  Neck:  Neck supple with no tenderness.  Psych: Affect normal   Respiratory: Breathing not labored.  Cardiovascular: normal peripheral pulses  Lymph: no adenopathy  Skin: No rashes,worrisome lesions or skin problems  Spine: straight, no straight leg raising pain.  Hips show full range of motion.  There is no tenderness over the sacro-iliac joints, sciatic notch, or greater trochanters.   Knees show full range of motion.  Positive tenderness of patello-femoral and medial joint line.  No ligament laxity.  Negative medial and lateral Sherri.  Sensation, motor and circulation are intact.    Assessment:  Right knee chondromalacia patella.  Plan:  Options discussed.  She would like to use Aleve, tylenol.  Quadriceps and hamstrings short term rehab.  Glucosamine 1500 mg/day and chondroitin sulfate 1200 mg/day advised.      Again, thank you for allowing me to participate in the care of your patient.        Sincerely,        Hernesto Cunningham MD

## 2017-08-31 NOTE — MR AVS SNAPSHOT
After Visit Summary   8/31/2017    Siri Vyas    MRN: [id number]           Patient Information     Date Of Birth          1970        Visit Information        Provider Department      8/31/2017 2:45 PM Hernesto Cunningham MD [facility]        Care Instructions    Options for osteoarthritis.    Weight loss.  Legs feel better the lighter you are.  Stay active - walking, bicycle, swimming.  Avoid high impact.  Vitamins - Glucosamine 1500 mg/day and chondroitin sulfate 1200 mg/day.  Non-medical options (other things I've heard of):  * tart cherry juice is thought to be a natural anti-inflammatory.    *1 tablespoon of apple cider vinegar daily,  *1 tablespoon of unflavored gelatin daily, [place] Gelatin or Bautista Nutrajoint  *Ointments on joints - Icy Hot, BenGay, Capsaicin cream, Mineral Ice, Flexall  Tylenol up to 3000 mg / day.  NSAIDs - Aleve up to 4 tablets per day or ibuprofen up to 12 tablets per day.   Prescription forms.  Steroid injection - cortisone.  Lubricant injection.   brace.  Surgery.  Possible arthroscopy. Usually joint replacement.              Follow-ups after your visit        Who to contact     If you have questions or need follow up information about today's clinic visit or your schedule please contact [facility] directly at [phone].  Normal or non-critical lab and imaging results will be communicated to you by MyChart, letter or phone within 4 business days after the clinic has received the results. If you do not hear from us within 7 days, please contact the clinic through MyChart or phone. If you have a critical or abnormal lab result, we will notify you by phone as soon as possible.  Submit refill requests through [employer] or call your pharmacy and they will forward the refill request to us. Please allow 3 business days for your refill to be completed.          Additional Information About Your Visit        MyChart Information   "   [facility] gives you secure access to your electronic health record. If you see a primary care provider, you can also send messages to your care team and make appointments. If you have questions, please call your primary care clinic.  If you do not have a primary care provider, please call [phone] and they will assist you.        Care EveryWhere ID     This is your Care EveryWhere ID. This could be used by other organizations to access your [facility] medical records  [id number]        Your Vitals Were     Temperature Respirations Height BMI (Body Mass Index)          98.7  F (37.1  C) 18 1.638 m (5' 4.5\") 37.86 kg/m2         Blood Pressure from Last 3 Encounters:   08/25/17 109/67   05/01/17 112/74   04/24/17 122/78    Weight from Last 3 Encounters:   08/31/17 101.6 kg (224 lb)   08/25/17 102.5 kg (226 lb)   05/01/17 100.2 kg (221 lb)              Today, you had the following     No orders found for display       Primary Care Provider Office Phone # Fax #    Chapito Coleman [phone]       [address] [address] [facility] [address]        Equal Access to Services     TRUNG [facility] CenterALVIN : Hadii aad ku hadasho Soomaali, waaxda luqadaha, qaybta kaalmada adeegyada, waxay jrin haykodyn nestor gee . So [facility] [phone].    ATENCIÓN: Si habla español, tiene a brandt disposición servicios gratuitos de asistencia lingüística. Llame al [phone].    We comply with applicable federal civil rights laws and [place] laws. We do not discriminate on the basis of race, color, national origin, age, disability sex, sexual orientation or gender identity.            Thank you!     Thank you for choosing [facility]  for your care. Our goal is always to provide you with excellent care. Hearing back from our patients is one way we can continue to improve our services. Please take a few minutes to complete the written survey that you may receive in the mail after your visit with us. Thank you!           "   Your Updated Medication List - Protect others around you: Learn how to safely use, store and throw away your medicines at www.disposemymeds.org.          This list is accurate as of: 8/31/17  3:34 PM.  Always use your most recent med list.                   Brand Name Dispense Instructions for use Diagnosis    fexofenadine 180 MG tablet    ALLEGRA    90 tablet    Take  by mouth. 1 TABLET DAILY FOR ALLERGIES    Seasonal allergic rhinitis       GLUCOSAMINE-CHONDROITIN PO           HYDROcodone-ibuprofen 7.5-200 MG per tablet    VICOPROFEN    30 tablet    Take 1 tablet by mouth daily as needed for moderate to severe pain    Pain and swelling of right knee       methylPREDNISolone 4 MG tablet    MEDROL DOSEPAK    21 tablet    Follow package instructions    Pain and swelling of right knee       MULTIVITAMINS PO           OMEGA-3 FISH OIL PO           SUMAtriptan 25 MG tablet    IMITREX    9 tablet    Take 1-2 tablets (25-50 mg) by mouth at onset of headache for migraine May repeat dose in 2 hours.  Do not exceed 200 mg in 24 hours    Headache(784.0)       TYLENOL 325 MG tablet   Generic drug:  acetaminophen      Take 325-650 mg by mouth every 6 hours as needed

## 2017-08-31 NOTE — PATIENT INSTRUCTIONS
Options for osteoarthritis.    Weight loss.  Legs feel better the lighter you are.  Stay active - walking, bicycle, swimming.  Avoid high impact.  Vitamins - Glucosamine 1500 mg/day and chondroitin sulfate 1200 mg/day.  Non-medical options (other things I've heard of):  * tart cherry juice is thought to be a natural anti-inflammatory.    *1 tablespoon of apple cider vinegar daily,  *1 tablespoon of unflavored gelatin daily, [place] Gelatin or Bautista Nutrajoint  *Ointments on joints - Icy Hot, BenGay, Capsaicin cream, Mineral Ice, Flexall  Tylenol up to 3000 mg / day.  NSAIDs - Aleve up to 4 tablets per day or ibuprofen up to 12 tablets per day.   Prescription forms.  Steroid injection - cortisone.  Lubricant injection.   brace.  Surgery.  Possible arthroscopy. Usually joint replacement.

## 2017-09-01 PROBLEM — M22.41 CHONDROMALACIA PATELLAE, RIGHT: Status: ACTIVE | Noted: 2017-09-01

## 2017-09-01 NOTE — PROGRESS NOTES
Siri Vyas is a 46 year old female who is seen in consultation at the request of Dr. Nathaniel Perez  for right knee pain.  She has had pain and swelling for 5 weeks.  No history of injury.  Worse pain with stairs, standing, walking.  Rates her pain at 3-4/10.  Has sharp, dull, aching, shooting pain.    MRI shows chondromalacia patella with fissuring and thinning.    Past Medical History:   Diagnosis Date     Anxiety disorder      AR (allergic rhinitis)      Depression with anxiety 10/2006     High cholesterol 10/2005     PE (pulmonary embolism) 2007       Past Surgical History:   Procedure Laterality Date     Dermoid cyst  (OVARY) removal       GYN SURGERY  ,,,          LAPAROSCOPIC CHOLECYSTECTOMY       SINUS SURGERY      Nasls sinuses     TONSILLECTOMY  1999       Family History   Problem Relation Age of Onset     Breast Cancer Mother      Lipids Mother      Hypertension Mother      Hearing Loss Mother      Lipids Brother      CEREBROVASCULAR DISEASE Maternal Grandmother 76     Neurologic Disorder Son 14     migraines       Social History     Social History     Marital status:      Spouse name: Jose Armando     Number of children: 4     Years of education: 16     Occupational History     [employer]     Social History Main Topics     Smoking status: Never Smoker     Smokeless tobacco: Never Used      Comment: no second hand smoke     Alcohol use Yes      Comment: 1-2 days in a week. Each time 1-2 beers. 0-5 beers weeks     Drug use: No     Sexual activity: Yes     Partners: Male     Other Topics Concern     Not on file     Social History Narrative       Current Outpatient Prescriptions   Medication Sig Dispense Refill     GLUCOSAMINE-CHONDROITIN PO        methylPREDNISolone (MEDROL DOSEPAK) 4 MG tablet Follow package instructions 21 tablet 2     HYDROcodone-ibuprofen (VICOPROFEN) 7.5-200 MG per tablet Take 1 tablet by mouth daily as needed  "for moderate to severe pain 30 tablet 0     Omega-3 Fatty Acids (OMEGA-3 FISH OIL PO)        Multiple Vitamin (MULTIVITAMINS PO)        SUMAtriptan (IMITREX) 25 MG tablet Take 1-2 tablets (25-50 mg) by mouth at onset of headache for migraine May repeat dose in 2 hours.  Do not exceed 200 mg in 24 hours 9 tablet 5     acetaminophen (TYLENOL) 325 MG tablet Take 325-650 mg by mouth every 6 hours as needed       fexofenadine (ALLEGRA) 180 MG tablet Take  by mouth. 1 TABLET DAILY FOR ALLERGIES 90 tablet 0       Allergies   Allergen Reactions     Dust Mites      No Clinical Screening - See Comments Other (See Comments)     No Known Drug Allergy      Seasonal Allergies        REVIEW OF SYSTEMS:  CONSTITUTIONAL:  NEGATIVE for fever, chills, change in weight, not feeling tired  SKIN:  NEGATIVE for worrisome rashes, no skin lumps, no skin ulcers and no non-healing wounds  EYES:  NEGATIVE for vision changes or irritation.  ENT/MOUTH:  NEGATIVE.  No hearing loss, no hoarseness, no difficulty swallowing.  RESP:  NEGATIVE. No cough or shortness of breath.  CV:  NEGATIVE for chest pain, palpitations or peripheral edema  GI:  NEGATIVE for nausea, abdominal pain, heartburn, or change in bowel habits  :  Negative. No dysuria, no hematuria  MUSCULOSKELETAL:  See HPI above  NEURO:  NEGATIVE . No headaches, no dizziness,  no numbness  ENDOCRINE:  NEGATIVE for temperature intolerance, skin/hair changes  HEME/ALLERGY/IMMUNE:  NEGATIVE for bleeding problems  PSYCHIATRIC:  NEGATIVE. no anxiety, no depression.      Exam:  Vitals: Temp 98.7  F (37.1  C)  Resp 18  Ht 1.638 m (5' 4.5\")  Wt 101.6 kg (224 lb)  BMI 37.86 kg/m2  BMI= Body mass index is 37.86 kg/(m^2).  Constitutional:  healthy, alert and no distress  Neuro: Alert and Oriented x 3, Gait normal. Sensation grossly WNL.  HEENT:  Atraumatic, EOMI  Neck:  Neck supple with no tenderness.  Psych: Affect normal   Respiratory: Breathing not labored.  Cardiovascular: normal peripheral " pulses  Lymph: no adenopathy  Skin: No rashes,worrisome lesions or skin problems  Spine: straight, no straight leg raising pain.  Hips show full range of motion.  There is no tenderness over the sacro-iliac joints, sciatic notch, or greater trochanters.   Knees show full range of motion.  Positive tenderness of patello-femoral and medial joint line.  No ligament laxity.  Negative medial and lateral Sherri.  Sensation, motor and circulation are intact.    Assessment:  Right knee chondromalacia patella.  Plan:  Options discussed.  She would like to use Aleve, tylenol.  Quadriceps and hamstrings short term rehab.  Glucosamine 1500 mg/day and chondroitin sulfate 1200 mg/day advised.

## 2017-11-01 ENCOUNTER — OFFICE VISIT (OUTPATIENT)
Dept: FAMILY MEDICINE | Facility: CLINIC | Age: 47
End: 2017-11-01
Payer: COMMERCIAL

## 2017-11-01 VITALS
HEART RATE: 73 BPM | TEMPERATURE: 97 F | BODY MASS INDEX: 38.22 KG/M2 | OXYGEN SATURATION: 98 % | HEIGHT: 65 IN | SYSTOLIC BLOOD PRESSURE: 118 MMHG | WEIGHT: 229.4 LBS | DIASTOLIC BLOOD PRESSURE: 73 MMHG

## 2017-11-01 DIAGNOSIS — N92.0 MENORRHAGIA WITH REGULAR CYCLE: ICD-10-CM

## 2017-11-01 DIAGNOSIS — Z86.711 HISTORY OF PULMONARY EMBOLISM: ICD-10-CM

## 2017-11-01 DIAGNOSIS — Z01.818 PREOP GENERAL PHYSICAL EXAM: Primary | ICD-10-CM

## 2017-11-01 DIAGNOSIS — E66.812 OBESITY, CLASS II, BMI 35-39.9: ICD-10-CM

## 2017-11-01 LAB — HGB BLD-MCNC: 13 G/DL (ref 11.7–15.7)

## 2017-11-01 PROCEDURE — 85018 HEMOGLOBIN: CPT | Performed by: NURSE PRACTITIONER

## 2017-11-01 PROCEDURE — 99214 OFFICE O/P EST MOD 30 MIN: CPT | Performed by: NURSE PRACTITIONER

## 2017-11-01 PROCEDURE — 36415 COLL VENOUS BLD VENIPUNCTURE: CPT | Performed by: NURSE PRACTITIONER

## 2017-11-01 NOTE — Clinical Note
Please abstract the following data from this visit with this patient into the appropriate field in Epic:  Mammogram done on this date: mammogram (approximately), by this group: [facility] , results were normal per pt.  Influenza vaccine 10/16/17 by her employer

## 2017-11-01 NOTE — MR AVS SNAPSHOT
After Visit Summary   11/1/2017    Siri Vyas    MRN: [id number]           Patient Information     Date Of Birth          1970        Visit Information        Provider Department      11/1/2017 7:00 AM Ines Mayen APRN CNP [facility]        Today's Diagnoses     Preop general physical exam    -  1    Menorrhagia with regular cycle        History of pulmonary embolism        Obesity, Class II, BMI 35-39.9          Care Instructions    Based on your medical history and these are the current health maintenance or preventive care services that you are due for (some may have been done at this visit)  Health Maintenance Due   Topic Date Due     MAMMO Q2 YR  04/27/2017     INFLUENZA VACCINE (SYSTEM ASSIGNED)  09/01/2017         At [facility], we strive to deliver an exceptional experience to you, every time we see you.    If you receive a survey in the mail, please send us back your thoughts. We really do value your feedback.    Your care team's suggested websites for health information:  Www.[facility].org : Up to date and easily searchable information on multiple topics.  Www.medlineplus.gov : medication info, interactive tutorials, watch real surgeries online  Www.familydoctor.org : good info from the Academy of Family Physicians  Www.cdc.gov : public health info, travel advisories, epidemics (H1N1)  Www.aap.org : children's health info, normal development, vaccinations  Www.health.state[place]us : [place] dept of health, public health issues in [place], N1N1    How to contact your care team:   Team Elvira/Spirit [phone]         Pharmacy [phone]    Dr. Rao, Donna Fabian PA-C, Sandhya Huang CNP, Rhonda Echols PA-C, Dr. Pérez, and QUANG Simpson CNP    Team RN: Guerita      Clinic hours  M-Th 7 am-7 pm   Fri 7 am-5 pm.   Urgent care M-F 11 am-9 pm,   Sat/Sun 9 am-5 pm.  Pharmacy M-Th 8 am-8 pm Fri 8 am-6 pm  Sat/Sun  9 am-5 pm.     All password changes, disabled accounts, or ID changes in Z[employer]mer[employer]/MyHealth will be done by our Access Services Department.    If you need help with your account or password, call: [phone]. Clinic staff no longer has the ability to change passwords.       Before Your Surgery      Call your surgeon if there is any change in your health. This includes signs of a cold or flu (such as a sore throat, runny nose, cough, rash or fever).    Do not smoke, drink alcohol or take over the counter medicine (unless your surgeon or primary care doctor tells you to) for the 24 hours before and after surgery.    If you take prescribed drugs: Follow your doctor s orders about which medicines to take and which to stop until after surgery.    Eating and drinking prior to surgery: follow the instructions from your surgeon    Take a shower or bath the night before surgery. Use the soap your surgeon gave you to gently clean your skin. If you do not have soap from your surgeon, use your regular soap. Do not shave or scrub the surgery site.  Wear clean pajamas and have clean sheets on your bed.             Follow-ups after your visit        Who to contact     If you have questions or need follow up information about today's clinic visit or your schedule please contact [facility] directly at [phone].  Normal or non-critical lab and imaging results will be communicated to you by Hoseannahart, letter or phone within 4 business days after the clinic has received the results. If you do not hear from us within 7 days, please contact the clinic through Ink361t or phone. If you have a critical or abnormal lab result, we will notify you by phone as soon as possible.  Submit refill requests through Zymergen or call your pharmacy and they will forward the refill request to us. Please allow 3 business days for your refill to be completed.          Additional Information About Your Visit        Z[employer]mergen Information  "    [employer]ap gives you secure access to your electronic health record. If you see a primary care provider, you can also send messages to your care team and make appointments. If you have questions, please call your primary care clinic.  If you do not have a primary care provider, please call [phone] and they will assist you.        Care EveryWhere ID     This is your Care EveryWhere ID. This could be used by other organizations to access your [place] medical records  [id number]        Your Vitals Were     Pulse Temperature Height Last Period Pulse Oximetry BMI (Body Mass Index)    73 97  F (36.1  C) (Oral) 5' 4.5\" (1.638 m) 10/25/2017 98% 38.77 kg/m2       Blood Pressure from Last 3 Encounters:   11/01/17 118/73   08/25/17 109/67   05/01/17 112/74    Weight from Last 3 Encounters:   11/01/17 229 lb 6.4 oz (104.1 kg)   08/31/17 224 lb (101.6 kg)   08/25/17 226 lb (102.5 kg)              Today, you had the following     No orders found for display         Today's Medication Changes          These changes are accurate as of: 11/1/17  7:35 AM.  If you have any questions, ask your nurse or doctor.               Stop taking these medicines if you haven't already. Please contact your care team if you have questions.     HYDROcodone-ibuprofen 7.5-200 MG per tablet   Commonly known as:  VICOPROFEN   Stopped by:  Ines Mayen APRN CNP                    Primary Care Provider Office Phone # Fax #    Nam M MD Jared [phone]       [address]  [facility] [address]        Equal Access to Services     [facility] : chriss Ann, facundo flaherty. So [facility] [phone].    ATENCIÓN: Si habla español, tiene a brandt disposición servicios gratuitos de asistencia lingüística. Llame al [phone].    We comply with applicable federal civil rights laws and [place] laws. We do not discriminate on the basis of race, " color, national origin, age, disability, sex, sexual orientation, or gender identity.            Thank you!     Thank you for choosing [facility]  for your care. Our goal is always to provide you with excellent care. Hearing back from our patients is one way we can continue to improve our services. Please take a few minutes to complete the written survey that you may receive in the mail after your visit with us. Thank you!             Your Updated Medication List - Protect others around you: Learn how to safely use, store and throw away your medicines at www.disposemymeds.org.          This list is accurate as of: 11/1/17  7:35 AM.  Always use your most recent med list.                   Brand Name Dispense Instructions for use Diagnosis    fexofenadine 180 MG tablet    ALLEGRA    90 tablet    Take  by mouth. 1 TABLET DAILY FOR ALLERGIES    Seasonal allergic rhinitis       GLUCOSAMINE-CHONDROITIN PO           methylPREDNISolone 4 MG tablet    MEDROL DOSEPAK    21 tablet    Follow package instructions    Pain and swelling of right knee       MULTIVITAMINS PO           OMEGA-3 FISH OIL PO           TYLENOL 325 MG tablet   Generic drug:  acetaminophen      Take 325-650 mg by mouth every 6 hours as needed

## 2017-11-01 NOTE — NURSING NOTE
"Chief Complaint   Patient presents with     Pre-Op Exam       Initial /73 (BP Location: Left arm, Patient Position: Sitting, Cuff Size: Adult Large)  Pulse 73  Temp 97  F (36.1  C) (Oral)  Ht 5' 4.5\" (1.638 m)  Wt 229 lb 6.4 oz (104.1 kg)  LMP 10/25/2017  SpO2 98%  BMI 38.77 kg/m2 Estimated body mass index is 38.77 kg/(m^2) as calculated from the following:    Height as of this encounter: 5' 4.5\" (1.638 m).    Weight as of this encounter: 229 lb 6.4 oz (104.1 kg).  Medication Reconciliation: complete   Katty Benson MA      "

## 2017-11-01 NOTE — PROGRESS NOTES
[address] [address]  [phone]  Dept: [phone]    PRE-OP EVALUATION:  Today's date: 2017    Siri Vyas (: 1970) presents for pre-operative evaluation assessment as requested by Dr. Renay Valentine.  She requires evaluation and anesthesia risk assessment prior to undergoing surgery/procedure for treatment of polyps removal and alrasion.      Date of Surgery/ Procedure: 2017  Time of Surgery/ Procedure: [facility]/Surgical Facility: [facility]  Fax number for surgical facility:   Primary Physician: Chapito Coleman  Type of Anesthesia Anticipated: General    Patient has a Health Care Directive or Living Will:  NO    Preop Questions 10/31/2017   1.  Do you have a history of heart attack, stroke, stent, bypass or surgery on an artery in the head, neck, heart or legs? No   2.  Do you ever have any pain or discomfort in your chest? No   3.  Do you have a history of  Heart Failure? No   4.   Are you troubled by shortness of breath when:  walking on a level surface, or up a slight hill, or at night? No   5.  Do you currently have a cold, bronchitis or other respiratory infection? No   6.  Do you have a cough, shortness of breath, or wheezing? No   7.  Do you sometimes get pains in the calves of your legs when you walk? No   8. Do you or anyone in your family have previous history of blood clots? YES - PE in    9.  Do you or does anyone in your family have a serious bleeding problem such as prolonged bleeding following surgeries or cuts? No   10. Have you ever had problems with anemia or been told to take iron pills? No   11. Have you had any abnormal blood loss such as black, tarry or bloody stools, or abnormal vaginal bleeding? No   12. Have you ever had a blood transfusion? No   13. Have you or any of your relatives ever had problems with anesthesia? No   14. Do you have sleep apnea, excessive snoring or daytime drowsiness? No   15.  Do you have any prosthetic heart valves? No   16. Do you have prosthetic joints? No   17. Is there any chance that you may be pregnant? No           HPI:                                                      Brief HPI related to upcoming procedure:t has had menorrhagia, US showed uterine polyps.  She is scheduled for hysteroscopy, excision of uterine polyps, uterine ablasion.  [facility] significant for PE thought to be due to COCP's in .  She also has history of dermoid cyst left ovary excision , s/p c section X 4..      See problem list for active medical problems.  Problems all longstanding and stable, except as noted/documented.  See ROS for pertinent symptoms related to these conditions.                                                                                                  .    MEDICAL HISTORY:                                                    Patient Active Problem List    Diagnosis Date Noted     Chondromalacia patellae, right 2017     Priority: Medium     Obesity, Class II, BMI 35-39.9 2017     Priority: Medium     Chondromalacia, knee, left 2017     Priority: Medium     Headache 2015     Priority: Medium     Problem list name updated by automated process. Provider to review       Tendinitis of left wrist 2014     Priority: Medium     Epicondylitis, medial humeral 2014     Priority: Medium     Median neuropathy 2014     Priority: Medium     Paresthesias/numbness 2014     Priority: Medium     LUQ abdominal pain 2012     Priority: Medium     S/P cholecystectomy 2012     Priority: Medium     Dermoid cyst of ovary 2012     Priority: Medium     Left ovary          H/O:  section 2012     Priority: Medium     X 4          CARDIOVASCULAR SCREENING; LDL GOAL LESS THAN 160 10/31/2010     Priority: Medium     Seasonal allergic rhinitis 2010     Priority: Medium      Past Medical History:   Diagnosis Date     Anxiety  "disorder      AR (allergic rhinitis)      Depression with anxiety 10/2006     High cholesterol 10/2005     PE (pulmonary embolism) 2007     Past Surgical History:   Procedure Laterality Date     Dermoid cyst  (OVARY) removal       GYN SURGERY  ,,,          LAPAROSCOPIC CHOLECYSTECTOMY       SINUS SURGERY  2002    Nasls sinuses     TONSILLECTOMY  1999     Current Outpatient Prescriptions   Medication Sig Dispense Refill     GLUCOSAMINE-CHONDROITIN PO        Omega-3 Fatty Acids (OMEGA-3 FISH OIL PO)        Multiple Vitamin (MULTIVITAMINS PO)        acetaminophen (TYLENOL) 325 MG tablet Take 325-650 mg by mouth every 6 hours as needed       fexofenadine (ALLEGRA) 180 MG tablet Take  by mouth. 1 TABLET DAILY FOR ALLERGIES 90 tablet 0     methylPREDNISolone (MEDROL DOSEPAK) 4 MG tablet Follow package instructions (Patient not taking: Reported on 2017) 21 tablet 2     OTC products: None, except as noted above    Allergies   Allergen Reactions     Dust Mites      No Clinical Screening - See Comments Other (See Comments)     No Known Drug Allergy      Seasonal Allergies       Latex Allergy: NO    Social History   Substance Use Topics     Smoking status: Never Smoker     Smokeless tobacco: Never Used      Comment: no second hand smoke     Alcohol use Yes      Comment: 1-2 days in a week. Each time 1-2 beers. 0-5 beers weeks     History   Drug Use No       REVIEW OF SYSTEMS:                                                    Constitutional, neuro, ENT, endocrine, pulmonary, cardiac, gastrointestinal, genitourinary, musculoskeletal, integument and psychiatric systems are negative, except as otherwise noted.      EXAM:                                                    /73 (BP Location: Left arm, Patient Position: Sitting, Cuff Size: Adult Large)  Pulse 73  Temp 97  F (36.1  C) (Oral)  Ht 5' 4.5\" (1.638 m)  Wt 229 lb 6.4 oz (104.1 kg)  LMP 10/25/2017  SpO2 98%  BMI " 38.77 kg/m2    GENERAL APPEARANCE: healthy, alert and no distress     EYES: EOMI, PERRL     HENT: ear canals and TM's normal and nose and mouth without ulcers or lesions     NECK: no adenopathy, no asymmetry, masses, or scars and thyroid normal to palpation     RESP: lungs clear to auscultation - no rales, rhonchi or wheezes     CV: regular rates and rhythm, normal S1 S2, no S3 or S4 and no murmur, click or rub     ABDOMEN:  soft, nontender, no HSM or masses and bowel sounds normal     MS: extremities normal- no gross deformities noted, no evidence of inflammation in joints, FROM in all extremities.     SKIN: no suspicious lesions or rashes     NEURO: Normal strength and tone, sensory exam grossly normal, mentation intact and speech normal     PSYCH: mentation appears normal. and affect normal/bright     LYMPHATICS: No axillary, cervical, or supraclavicular nodes    DIAGNOSTICS:                                                    EKG: Not indicated due to non-vascular surgery and low risk of event (age <65 and without cardiac risk factors)  Hemoglobin (indicated for history of anemia or procedure with significant blood loss such as tonsillectomy, major intraperitoneal surgery, vascular surgery, major spine surgery, total joint replacement)    Recent Labs   Lab Test  04/24/17   1622  01/21/16   1427   HGB  12.0  12.1   PLT  345  380   NA  137  137   POTASSIUM  3.9  3.5   CR  0.60  0.78      Hemoglobin   Status:  Final result   Visible to patient:  No (Not Released) Dx:  Preop general physical exam Order: [id number]             Ref Range & Units 7:44 AM   6mo ago   1yr ago   5yr ago        Hemoglobin 11.7 - 15.7 g/dL 13.0 12.0 12.1 13.5     Resulting Agency  [facility]          Specimen Collected: 11/01/17  7:44 AM Last Resulted: 11/01/17  8:21 AM                                   IMPRESSION:                                                    Reason for surgery/procedure:  menorrhagia/hysteroscopy, excision of uterine polyps, uterine ablasion  Diagnosis/reason for consult: preoperative physical    The proposed surgical procedure is considered INTERMEDIATE risk.    REVISED CARDIAC RISK INDEX  The patient has the following serious cardiovascular risks for perioperative complications such as (MI, PE, VFib and 3  AV Block):  No serious cardiac risks  INTERPRETATION: 0 risks: Class I (very low risk - 0.4% complication rate)    The patient has the following additional risks for perioperative complications:  No identified additional risks  The 10-year ASCVD risk score (Hamlin DC Jr, et al., 2013) is: 0.7%    Values used to calculate the score:      Age: 47 years      Sex: Female      Is Non- : No      Diabetic: No      Tobacco smoker: No      Systolic Blood Pressure: 118 mmHg      Is BP treated: No      HDL Cholesterol: 60 mg/dL      Total Cholesterol: 203 mg/dL      ICD-10-CM    1. Preop general physical exam Z01.818    2. Menorrhagia with regular cycle N92.0    3. History of pulmonary embolism Z86.711    4. Obesity, Class II, BMI 35-39.9 E66.9        RECOMMENDATIONS:                                                        --Patient is to take all scheduled medications on the day of surgery EXCEPT for modifications listed below.    APPROVAL GIVEN to proceed with proposed procedure, without further diagnostic evaluation       Signed Electronically by: QUANG Davis CNP    Copy of this evaluation report is provided to requesting physician.    [facility] Preop Guidelines

## 2017-11-01 NOTE — PATIENT INSTRUCTIONS
Based on your medical history and these are the current health maintenance or preventive care services that you are due for (some may have been done at this visit)  Health Maintenance Due   Topic Date Due     MAMMO Q2 YR  04/27/2017     INFLUENZA VACCINE (SYSTEM ASSIGNED)  09/01/2017         At [facility], we strive to deliver an exceptional experience to you, every time we see you.    If you receive a survey in the mail, please send us back your thoughts. We really do value your feedback.    Your care team's suggested websites for health information:  Www.[facility].org : Up to date and easily searchable information on multiple topics.  Www.medlineplus.gov : medication info, interactive tutorials, watch real surgeries online  Www.familydoctor.org : good info from the Academy of Family Physicians  Www.cdc.gov : public health info, travel advisories, epidemics (H1N1)  Www.aap.org : children's health info, normal development, vaccinations  Www.health.[facility].[place].us : [place] dept of health, public health issues in [place], N1N1    How to contact your care team:   Team Elvira/Francisco [phone]         Pharmacy [phone]    Dr. Rao, Donna Fabian PA-C, Dr. Sosa, Sandhya DEL RIO CNP, Rhonda Echols PA-C, Dr. Pérez, and QUANG Simpson CNP    Team RN: Guerita      Clinic hours  M-Th 7 am-7 pm   Fri 7 am-5 pm.   Urgent care M-F 11 am-9 pm,   Sat/Sun 9 am-5 pm.  Pharmacy M-Th 8 am-8 pm Fri 8 am-6 pm  Sat/Sun 9 am-5 pm.     All password changes, disabled accounts, or ID changes in Project Playlist/MyHealth will be done by our Access Services Department.    If you need help with your account or password, call: [phone]. Clinic staff no longer has the ability to change passwords.       Before Your Surgery      Call your surgeon if there is any change in your health. This includes signs of a cold or flu (such as a sore throat, runny nose, cough, rash or fever).    Do not smoke, drink alcohol or  take over the counter medicine (unless your surgeon or primary care doctor tells you to) for the 24 hours before and after surgery.    If you take prescribed drugs: Follow your doctor s orders about which medicines to take and which to stop until after surgery.    Eating and drinking prior to surgery: follow the instructions from your surgeon    Take a shower or bath the night before surgery. Use the soap your surgeon gave you to gently clean your skin. If you do not have soap from your surgeon, use your regular soap. Do not shave or scrub the surgery site.  Wear clean pajamas and have clean sheets on your bed.

## 2018-03-15 ENCOUNTER — TELEPHONE (OUTPATIENT)
Dept: FAMILY MEDICINE | Facility: CLINIC | Age: 48
End: 2018-03-15

## 2018-03-15 ENCOUNTER — OFFICE VISIT (OUTPATIENT)
Dept: FAMILY MEDICINE | Facility: CLINIC | Age: 48
End: 2018-03-15
Payer: COMMERCIAL

## 2018-03-15 VITALS
BODY MASS INDEX: 38.76 KG/M2 | DIASTOLIC BLOOD PRESSURE: 73 MMHG | TEMPERATURE: 97.1 F | OXYGEN SATURATION: 97 % | HEIGHT: 64 IN | SYSTOLIC BLOOD PRESSURE: 119 MMHG | HEART RATE: 82 BPM | WEIGHT: 227 LBS

## 2018-03-15 DIAGNOSIS — Z12.31 ENCOUNTER FOR SCREENING MAMMOGRAM FOR BREAST CANCER: ICD-10-CM

## 2018-03-15 DIAGNOSIS — Z00.00 ENCOUNTER FOR ROUTINE ADULT HEALTH EXAMINATION WITHOUT ABNORMAL FINDINGS: Primary | ICD-10-CM

## 2018-03-15 DIAGNOSIS — Z13.6 CARDIOVASCULAR SCREENING; LDL GOAL LESS THAN 160: ICD-10-CM

## 2018-03-15 DIAGNOSIS — E66.812 CLASS 2 OBESITY DUE TO EXCESS CALORIES WITHOUT SERIOUS COMORBIDITY WITH BODY MASS INDEX (BMI) OF 39.0 TO 39.9 IN ADULT: ICD-10-CM

## 2018-03-15 DIAGNOSIS — E66.09 CLASS 2 OBESITY DUE TO EXCESS CALORIES WITHOUT SERIOUS COMORBIDITY WITH BODY MASS INDEX (BMI) OF 39.0 TO 39.9 IN ADULT: ICD-10-CM

## 2018-03-15 DIAGNOSIS — Z80.3 FAMILY HISTORY OF MALIGNANT NEOPLASM OF BREAST: ICD-10-CM

## 2018-03-15 LAB
ALBUMIN SERPL-MCNC: 3.5 G/DL (ref 3.4–5)
ALP SERPL-CCNC: 70 U/L (ref 40–150)
ALT SERPL W P-5'-P-CCNC: 41 U/L (ref 0–50)
ANION GAP SERPL CALCULATED.3IONS-SCNC: 10 MMOL/L (ref 3–14)
AST SERPL W P-5'-P-CCNC: 31 U/L (ref 0–45)
BILIRUB SERPL-MCNC: 0.4 MG/DL (ref 0.2–1.3)
BUN SERPL-MCNC: 8 MG/DL (ref 7–30)
CALCIUM SERPL-MCNC: 9.2 MG/DL (ref 8.5–10.1)
CHLORIDE SERPL-SCNC: 103 MMOL/L (ref 94–109)
CHOLEST SERPL-MCNC: 223 MG/DL
CO2 SERPL-SCNC: 26 MMOL/L (ref 20–32)
CREAT SERPL-MCNC: 0.55 MG/DL (ref 0.52–1.04)
ERYTHROCYTE [DISTWIDTH] IN BLOOD BY AUTOMATED COUNT: 13.7 % (ref 10–15)
GFR SERPL CREATININE-BSD FRML MDRD: >90 ML/MIN/1.7M2
GLUCOSE SERPL-MCNC: 90 MG/DL (ref 70–99)
HCT VFR BLD AUTO: 42.2 % (ref 35–47)
HDLC SERPL-MCNC: 61 MG/DL
HGB BLD-MCNC: 13.9 G/DL (ref 11.7–15.7)
LDLC SERPL CALC-MCNC: 135 MG/DL
MCH RBC QN AUTO: 28.5 PG (ref 26.5–33)
MCHC RBC AUTO-ENTMCNC: 32.9 G/DL (ref 31.5–36.5)
MCV RBC AUTO: 87 FL (ref 78–100)
NONHDLC SERPL-MCNC: 162 MG/DL
PLATELET # BLD AUTO: 353 10E9/L (ref 150–450)
POTASSIUM SERPL-SCNC: 3.7 MMOL/L (ref 3.4–5.3)
PROT SERPL-MCNC: 8.2 G/DL (ref 6.8–8.8)
RBC # BLD AUTO: 4.87 10E12/L (ref 3.8–5.2)
SODIUM SERPL-SCNC: 139 MMOL/L (ref 133–144)
TRIGL SERPL-MCNC: 135 MG/DL
TSH SERPL DL<=0.005 MIU/L-ACNC: 2.08 MU/L (ref 0.4–4)
WBC # BLD AUTO: 8.3 10E9/L (ref 4–11)

## 2018-03-15 PROCEDURE — 82306 VITAMIN D 25 HYDROXY: CPT | Performed by: NURSE PRACTITIONER

## 2018-03-15 PROCEDURE — 80061 LIPID PANEL: CPT | Performed by: NURSE PRACTITIONER

## 2018-03-15 PROCEDURE — 85027 COMPLETE CBC AUTOMATED: CPT | Performed by: NURSE PRACTITIONER

## 2018-03-15 PROCEDURE — 36415 COLL VENOUS BLD VENIPUNCTURE: CPT | Performed by: NURSE PRACTITIONER

## 2018-03-15 PROCEDURE — 99396 PREV VISIT EST AGE 40-64: CPT | Performed by: NURSE PRACTITIONER

## 2018-03-15 PROCEDURE — 80053 COMPREHEN METABOLIC PANEL: CPT | Performed by: NURSE PRACTITIONER

## 2018-03-15 PROCEDURE — 84443 ASSAY THYROID STIM HORMONE: CPT | Performed by: NURSE PRACTITIONER

## 2018-03-15 NOTE — MR AVS SNAPSHOT
After Visit Summary   3/15/2018    Siri Vyas    MRN: [id number]           Patient Information     Date Of Birth          1970        Visit Information        Provider Department      3/15/2018 8:00 AM Alba Lim APRN [facility]        Today's Diagnoses     Encounter for routine adult health examination without abnormal findings    -  1    Class 2 obesity due to excess calories without serious comorbidity with body mass index (BMI) of 39.0 to 39.9 in adult        Family history of malignant neoplasm of breast        Encounter for screening mammogram for breast cancer        CARDIOVASCULAR SCREENING; LDL GOAL LESS THAN 160          Care Instructions    Siri,  Carlos meeting you today. For your mammogram, you can call our normal clinic number to schedule [phone].  I have entered the order.     Some tips on nutrition:  1) Make sure you drink at least 10 glasses of water a day  2) Try to make sure at least half of your plate at every meal is vegetables (yes, even breakfast!)  3) Choose a physical activity that you enjoy doing (walking, running, perry, yoga, boot camp, weightlifting) and do that at least three days a week.  If you can do more, that would be better, but set realistic goals for yourself  4)Try looking at scroll kit.MK2Media for recipe ideas, calorie counters, and exercise motivation.  It is a good site with a lot of information.    Preventive Health Recommendations  Female Ages 40 to 49    Yearly exam:     See your health care provider every year in order to  1. Review health changes.   2. Discuss preventive care.    3. Review your medicines if your doctor prescribed any.      Get a Pap test every three years (unless you have an abnormal result and your provider advises testing more often).      If you get Pap tests with HPV test, you only need to test every 5 years, unless you have an abnormal result. You do not need a Pap test if your  uterus was removed (hysterectomy) and you have not had cancer.      You should be tested each year for STDs (sexually transmitted diseases), if you're at risk.       Ask your doctor if you should have a mammogram.      Have a colonoscopy (test for colon cancer) if someone in your family has had colon cancer or polyps before age 50.       Have a cholesterol test every 5 years.       Have a diabetes test (fasting glucose) after age 45. If you are at risk for diabetes, you should have this test every 3 years.    Shots: Get a flu shot each year. Get a tetanus shot every 10 years.     Nutrition:     Eat at least 5 servings of fruits and vegetables each day.    Eat whole-grain bread, whole-wheat pasta and brown rice instead of white grains and rice.    Talk to your provider about Calcium and Vitamin D.     Lifestyle    Exercise at least 150 minutes a week (an average of 30 minutes a day, 5 days a week). This will help you control your weight and prevent disease.    Limit alcohol to one drink per day.    No smoking.     Wear sunscreen to prevent skin cancer.    See your dentist every six months for an exam and cleaning.          Follow-ups after your visit        Follow-up notes from your care team     Return in about 6 months (around 9/15/2018) for weight check.      Future tests that were ordered for you today     Open Future Orders        Priority Expected Expires Ordered    MA Screening Digital Bilateral Routine  3/15/2019 3/15/2018            Who to contact     If you have questions or need follow up information about today's clinic visit or your schedule please contact [facility] directly at [phone].  Normal or non-critical lab and imaging results will be communicated to you by MyChart, letter or phone within 4 business days after the clinic has received the results. If you do not hear from us within 7 days, please contact the clinic through MyChart or phone. If you have a critical or  "abnormal lab result, we will notify you by phone as soon as possible.  Submit refill requests through BugHerd or call your pharmacy and they will forward the refill request to us. Please allow 3 business days for your refill to be completed.          Additional Information About Your Visit        [facility] Information     BugHerd gives you secure access to your electronic health record. If you see a primary care provider, you can also send messages to your care team and make appointments. If you have questions, please call your primary care clinic.  If you do not have a primary care provider, please call [phone] and they will assist you.        Care EveryWhere ID     This is your Care EveryWhere ID. This could be used by other organizations to access your [facility] medical records  [id number]        Your Vitals Were     Pulse Temperature Height Last Period Pulse Oximetry BMI (Body Mass Index)    82 97.1  F (36.2  C) (Oral) 5' 3.58\" (1.615 m) 03/01/2018 (Within Days) 97% 39.48 kg/m2       Blood Pressure from Last 3 Encounters:   03/15/18 119/73   11/01/17 118/73   08/25/17 109/67    Weight from Last 3 Encounters:   03/15/18 227 lb (103 kg)   11/01/17 229 lb 6.4 oz (104.1 kg)   08/31/17 224 lb (101.6 kg)              We Performed the Following     CBC with platelets     Comprehensive metabolic panel     Lipid panel reflex to direct LDL Non-fasting     TSH with free T4 reflex     Vitamin D Deficiency        Primary Care Provider Office Phone # Fax #    Chapito Coleman [phone]       [address]  [facility] [address]        Equal Access to Services     [facility]: Hadii aad ku hadasho Soomaali, waaxda luqadaha, qaybta kaalmada facundo mejia. So [facility] [phone].    ATENCIÓN: Si habla español, tiene a brandt disposición servicios gratuitos de asistencia lingüística. Llame al [phone].    We comply with applicable federal civil rights laws and [place] " laws. We do not discriminate on the basis of race, color, national origin, age, disability, sex, sexual orientation, or gender identity.            Thank you!     Thank you for choosing [facility]  for your care. Our goal is always to provide you with excellent care. Hearing back from our patients is one way we can continue to improve our services. Please take a few minutes to complete the written survey that you may receive in the mail after your visit with us. Thank you!             Your Updated Medication List - Protect others around you: Learn how to safely use, store and throw away your medicines at www.disposemymeds.org.          This list is accurate as of 3/15/18  8:49 AM.  Always use your most recent med list.                   Brand Name Dispense Instructions for use Diagnosis    fexofenadine 180 MG tablet    ALLEGRA    90 tablet    Take  by mouth. 1 TABLET DAILY FOR ALLERGIES    Seasonal allergic rhinitis       GLUCOSAMINE-CHONDROITIN PO           methylPREDNISolone 4 MG tablet    MEDROL DOSEPAK    21 tablet    Follow package instructions    Pain and swelling of right knee       MULTIVITAMINS PO           OMEGA-3 FISH OIL PO           TYLENOL 325 MG tablet   Generic drug:  acetaminophen      Take 325-650 mg by mouth every 6 hours as needed

## 2018-03-15 NOTE — PATIENT INSTRUCTIONS
Siri,  Pleasure meeting you today. For your mammogram, you can call our normal clinic number to schedule [phone].  I have entered the order.     Some tips on nutrition:  1) Make sure you drink at least 10 glasses of water a day  2) Try to make sure at least half of your plate at every meal is vegetables (yes, even breakfast!)  3) Choose a physical activity that you enjoy doing (walking, running, perry, yoga, boot camp, weightlifting) and do that at least three days a week.  If you can do more, that would be better, but set realistic goals for yourself  4)Try looking at [employer].The Scene for recipe ideas, calorie counters, and exercise motivation.  It is a good site with a lot of information.    Preventive Health Recommendations  Female Ages 40 to 49    Yearly exam:     See your health care provider every year in order to  1. Review health changes.   2. Discuss preventive care.    3. Review your medicines if your doctor prescribed any.      Get a Pap test every three years (unless you have an abnormal result and your provider advises testing more often).      If you get Pap tests with HPV test, you only need to test every 5 years, unless you have an abnormal result. You do not need a Pap test if your uterus was removed (hysterectomy) and you have not had cancer.      You should be tested each year for STDs (sexually transmitted diseases), if you're at risk.       Ask your doctor if you should have a mammogram.      Have a colonoscopy (test for colon cancer) if someone in your family has had colon cancer or polyps before age 50.       Have a cholesterol test every 5 years.       Have a diabetes test (fasting glucose) after age 45. If you are at risk for diabetes, you should have this test every 3 years.    Shots: Get a flu shot each year. Get a tetanus shot every 10 years.     Nutrition:     Eat at least 5 servings of fruits and vegetables each day.    Eat whole-grain bread, whole-wheat pasta and brown rice  instead of white grains and rice.    Talk to your provider about Calcium and Vitamin D.     Lifestyle    Exercise at least 150 minutes a week (an average of 30 minutes a day, 5 days a week). This will help you control your weight and prevent disease.    Limit alcohol to one drink per day.    No smoking.     Wear sunscreen to prevent skin cancer.    See your dentist every six months for an exam and cleaning.

## 2018-03-15 NOTE — PROGRESS NOTES
SUBJECTIVE:   CC: Siri Vyas is an 47 year old woman who presents for preventive health visit.     Healthy Habits:    Do you get at least three servings of calcium containing foods daily (dairy, green leafy vegetables, etc.)? yes    Amount of exercise or daily activities, outside of work: 3 day(s) per week    Problems taking medications regularly No    Medication side effects: No    Have you had an eye exam in the past two years? yes    Do you see a dentist twice per year? yes    Do you have sleep apnea, excessive snoring or daytime drowsiness?no  Today's PHQ-2 Score:   PHQ-2 (  [employer]) 3/15/2018 9/15/2016   Q1: Little interest or pleasure in doing things 0 0   Q2: Feeling down, depressed or hopeless 0 0   PHQ-2 Score 0 0     Abuse: Current or Past(Physical, Sexual or Emotional)- No  Do you feel safe in your environment - Yes    Social History   Substance Use Topics     Smoking status: Never Smoker     Smokeless tobacco: Never Used      Comment: no second hand smoke     Alcohol use Yes      Comment: 1-2 days in a week. Each time 1-2 beers. 0-5 beers weeks     If you drink alcohol do you typically have >3 drinks per day or >7 drinks per week? No                     Reviewed orders with patient.  Reviewed health maintenance and updated orders accordingly - Yes  Labs reviewed in EPIC  BP Readings from Last 3 Encounters:   03/15/18 119/73   17 118/73   17 109/67    Wt Readings from Last 3 Encounters:   03/15/18 227 lb (103 kg)   17 229 lb 6.4 oz (104.1 kg)   17 224 lb (101.6 kg)                  Patient Active Problem List   Diagnosis     Seasonal allergic rhinitis     CARDIOVASCULAR SCREENING; LDL GOAL LESS THAN 160     S/P cholecystectomy     Dermoid cyst of ovary     H/O:  section     LUQ abdominal pain     Paresthesias/numbness     Median neuropathy     Epicondylitis, medial humeral     Tendinitis of left wrist     Headache     Chondromalacia, knee, left     Obesity,  Class II, BMI 35-39.9     Chondromalacia patellae, right     CTS (carpal tunnel syndrome)     Past Surgical History:   Procedure Laterality Date     Dermoid cyst  (OVARY) removal       GYN SURGERY  ,,,          LAPAROSCOPIC CHOLECYSTECTOMY       SINUS SURGERY  2002    Nasls sinuses     TONSILLECTOMY  1999       Social History   Substance Use Topics     Smoking status: Never Smoker     Smokeless tobacco: Never Used      Comment: no second hand smoke     Alcohol use Yes      Comment: 1-2 days in a week. Each time 1-2 beers. 0-5 beers weeks     Family History   Problem Relation Age of Onset     Breast Cancer Mother      Lipids Mother      Hypertension Mother      Hearing Loss Mother      Lung Cancer Mother      2017     Lipids Brother      CEREBROVASCULAR DISEASE Maternal Grandmother 76     Neurologic Disorder Son 14     migraines     Breast Cancer Maternal Aunt 70     Breast Cancer Maternal Aunt 80         Current Outpatient Prescriptions   Medication Sig Dispense Refill     GLUCOSAMINE-CHONDROITIN PO        Omega-3 Fatty Acids (OMEGA-3 FISH OIL PO)        Multiple Vitamin (MULTIVITAMINS PO)        acetaminophen (TYLENOL) 325 MG tablet Take 325-650 mg by mouth every 6 hours as needed       fexofenadine (ALLEGRA) 180 MG tablet Take  by mouth. 1 TABLET DAILY FOR ALLERGIES 90 tablet 0     methylPREDNISolone (MEDROL DOSEPAK) 4 MG tablet Follow package instructions (Patient not taking: Reported on 2017) 21 tablet 2     Allergies   Allergen Reactions     Dust Mites      No Clinical Screening - See Comments Other (See Comments)     No Known Drug Allergy      Seasonal Allergies      Recent Labs   Lab Test  17   1622  16   1427  12   1224   LDL  80   --    --    HDL  60   --    --    TRIG  316*   --    --    ALT  34  26  18   CR  0.60  0.78  0.59   GFRESTIMATED  >90  Non  GFR Calc    80  >90   GFRESTBLACK  >90   GFR Calc     >90   GFR Calc    >90   POTASSIUM  3.9  3.5  3.6   TSH   --    --   1.72        Patient under age 50, mutual decision reflected in health maintenance.      Pertinent mammograms are reviewed under the imaging tab.  History of abnormal Pap smear: NO - age 30-65 PAP every 5 years with negative HPV co-testing recommended    Reviewed and updated as needed this visit by clinical staff  Tobacco  Allergies  Meds  Med Hx  Surg Hx  Fam Hx  Soc Hx        Reviewed and updated as needed this visit by Provider  Tobacco  Med Hx  Surg Hx  Fam Hx  Soc Hx       Past Medical History:   Diagnosis Date     Anxiety disorder      AR (allergic rhinitis)      Depression with anxiety 10/2006     High cholesterol 10/2005     PE (pulmonary embolism) 2007      Past Surgical History:   Procedure Laterality Date     Dermoid cyst  (OVARY) removal       GYN SURGERY  ,,,          LAPAROSCOPIC CHOLECYSTECTOMY       SINUS SURGERY      Nasls sinuses     TONSILLECTOMY  1999     Obstetric History       T0      L4     SAB0   TAB0   Ectopic0   Multiple0   Live Births4       # Outcome Date GA Lbr Carlitos/2nd Weight Sex Delivery Anes PTL Lv   4       CS-Unspec   BISI   3       CS-Unspec   BISI   2       CS-Unspec   BISI   1       CS-Unspec   BISI          ROS:  C: NEGATIVE for fever, chills, change in weight  I: NEGATIVE for worrisome rashes, moles or lesions  E: NEGATIVE for vision changes or irritation  ENT: NEGATIVE for ear, mouth and throat problems  R: NEGATIVE for significant cough or SOB  B: NEGATIVE for masses, tenderness or discharge  CV: NEGATIVE for chest pain, palpitations or peripheral edema  GI: NEGATIVE for nausea, abdominal pain, heartburn, or change in bowel habits  : NEGATIVE for unusual urinary or vaginal symptoms. Periods are regular.  M: NEGATIVE for significant arthralgias or myalgia  N: NEGATIVE for weakness,  "dizziness or paresthesias  E: NEGATIVE for temperature intolerance, skin/hair changes  P: NEGATIVE for changes in mood or affect    OBJECTIVE:   /73 (BP Location: Left arm, Patient Position: Chair, Cuff Size: Adult Large)  Pulse 82  Temp 97.1  F (36.2  C) (Oral)  Ht 5' 3.58\" (1.615 m)  Wt 227 lb (103 kg)  LMP 03/01/2018 (Within Days)  SpO2 97%  BMI 39.48 kg/m2  EXAM:  GENERAL: healthy, alert and no distress  EYES: Eyes grossly normal to inspection, PERRL and conjunctivae and sclerae normal  HENT: ear canals and TM's normal, nose and mouth without ulcers or lesions  NECK: no adenopathy, no asymmetry, masses, or scars and thyroid normal to palpation  RESP: lungs clear to auscultation - no rales, rhonchi or wheezes  CV: regular rate and rhythm, normal S1 S2, no S3 or S4, no murmur, click or rub, no peripheral edema and peripheral pulses strong  ABDOMEN: soft, nontender, no hepatosplenomegaly, no masses and bowel sounds normal   (female): deferred; follows with gynecologist yearly  MS: no gross musculoskeletal defects noted, no edema  SKIN: no suspicious lesions or rashes  NEURO: Normal strength and tone, mentation intact and speech normal  PSYCH: mentation appears normal, affect normal/bright; teary when discussing mother's cancer diagnosis    ASSESSMENT/PLAN:   1. Encounter for routine adult health examination without abnormal findings  Normal exam.  Follows with [facility] for pelvic exams and pap smears.  No signs of menopause yet, periods monthly, no vasomotor symptoms.  - Vitamin D Deficiency  - Comprehensive metabolic panel    2. Class 2 obesity due to excess calories without serious comorbidity with body mass index (BMI) of 39.0 to 39.9 in adult  Discussed weight, exercise, and diet at length today. Guidance as per patient instructions.  - TSH with free T4 reflex  - CBC with platelets    3. Family history of malignant neoplasm of breast  In mother and 2 maternal aunts.  Patient has had " "BRCA testing and was negative.  Normal yearly mammograms.    4. Encounter for screening mammogram for breast cancer  - MA Screening Digital Bilateral; Future    5. CARDIOVASCULAR SCREENING; LDL GOAL LESS THAN 160  - Lipid panel reflex to direct LDL Non-fasting    COUNSELING:   Reviewed preventive health counseling, as reflected in patient instructions       Regular exercise       Healthy diet/nutrition       Vision screening       (Candy)menopause management         reports that she has never smoked. She has never used smokeless tobacco.    Estimated body mass index is 38.77 kg/(m^2) as calculated from the following:    Height as of 11/1/17: 5' 4.5\" (1.638 m).    Weight as of 11/1/17: 229 lb 6.4 oz (104.1 kg).   Weight management plan: Discussed healthy diet and exercise guidelines and patient will follow up in 6 months in clinic to re-evaluate.    Counseling Resources:  ATP IV Guidelines  Pooled Cohorts Equation Calculator  Breast Cancer Risk Calculator  FRAX Risk Assessment  ICSI Preventive Guidelines  Dietary Guidelines for Americans, 2010  gaytravel.com's MyPlate  ASA Prophylaxis  Lung CA Screening    [facility]ANG [facility]  Patient Instructions   Carlos Horner meeting you today. For your mammogram, you can call our normal clinic number to schedule [phone].  I have entered the order.     Some tips on nutrition:  1) Make sure you drink at least 10 glasses of water a day  2) Try to make sure at least half of your plate at every meal is vegetables (yes, even breakfast!)  3) Choose a physical activity that you enjoy doing (walking, running, perry, yoga, boot camp, weightlifting) and do that at least three days a week.  If you can do more, that would be better, but set realistic goals for yourself  4)Try looking at DigitalChalkplate.gov for recipe ideas, calorie counters, and exercise motivation.  It is a good site with a lot of information.    Preventive Health " Recommendations  Female Ages 40 to 49    Yearly exam:     See your health care provider every year in order to  1. Review health changes.   2. Discuss preventive care.    3. Review your medicines if your doctor prescribed any.      Get a Pap test every three years (unless you have an abnormal result and your provider advises testing more often).      If you get Pap tests with HPV test, you only need to test every 5 years, unless you have an abnormal result. You do not need a Pap test if your uterus was removed (hysterectomy) and you have not had cancer.      You should be tested each year for STDs (sexually transmitted diseases), if you're at risk.       Ask your doctor if you should have a mammogram.      Have a colonoscopy (test for colon cancer) if someone in your family has had colon cancer or polyps before age 50.       Have a cholesterol test every 5 years.       Have a diabetes test (fasting glucose) after age 45. If you are at risk for diabetes, you should have this test every 3 years.    Shots: Get a flu shot each year. Get a tetanus shot every 10 years.     Nutrition:     Eat at least 5 servings of fruits and vegetables each day.    Eat whole-grain bread, whole-wheat pasta and brown rice instead of white grains and rice.    Talk to your provider about Calcium and Vitamin D.     Lifestyle    Exercise at least 150 minutes a week (an average of 30 minutes a day, 5 days a week). This will help you control your weight and prevent disease.    Limit alcohol to one drink per day.    No smoking.     Wear sunscreen to prevent skin cancer.    See your dentist every six months for an exam and cleaning.

## 2018-03-16 LAB — DEPRECATED CALCIDIOL+CALCIFEROL SERPL-MC: 34 UG/L (ref 20–75)

## 2018-06-25 ENCOUNTER — HEALTH MAINTENANCE LETTER (OUTPATIENT)
Age: 48
End: 2018-06-25

## 2018-07-11 ENCOUNTER — OFFICE VISIT (OUTPATIENT)
Dept: FAMILY MEDICINE | Facility: CLINIC | Age: 48
End: 2018-07-11
Payer: COMMERCIAL

## 2018-07-11 VITALS
RESPIRATION RATE: 16 BRPM | HEIGHT: 64 IN | OXYGEN SATURATION: 99 % | WEIGHT: 206 LBS | TEMPERATURE: 98.1 F | DIASTOLIC BLOOD PRESSURE: 77 MMHG | SYSTOLIC BLOOD PRESSURE: 119 MMHG | HEART RATE: 73 BPM | BODY MASS INDEX: 35.17 KG/M2

## 2018-07-11 DIAGNOSIS — M25.562 POSTERIOR LEFT KNEE PAIN: Primary | ICD-10-CM

## 2018-07-11 DIAGNOSIS — L57.0 ACTINIC KERATOSIS: ICD-10-CM

## 2018-07-11 PROCEDURE — 99213 OFFICE O/P EST LOW 20 MIN: CPT | Mod: 25 | Performed by: NURSE PRACTITIONER

## 2018-07-11 PROCEDURE — 17000 DESTRUCT PREMALG LESION: CPT | Performed by: NURSE PRACTITIONER

## 2018-07-11 ASSESSMENT — PAIN SCALES - GENERAL: PAINLEVEL: SEVERE PAIN (7)

## 2018-07-11 NOTE — PATIENT INSTRUCTIONS
Knee  Alternate heat and ice for 15 minutes 4-6 times per day  Tylenol 650mg-1000mg every 6-8 hours for pain  Ibuprofen 400-600mg every 6-8 hours for pain/inflammation.   Continue to rest  If no improvement within the next 2 weeks, please let me know and we will refer you to orthopedics.   Contact me through Yushinot.     Actinic keratosis (back lesion)  Freeze today with liquid nitrogen.   If no improvement in the next couple weeks (does not go away), let me know and we will refer you to dermatology.         At [facility], we strive to deliver an exceptional experience to you, every time we see you.  If you receive a survey in the mail, please send us back your thoughts. We really do value your feedback.    Based on your medical history, these are the current health maintenance/preventive care services that you are due for (some may have been done at this visit.)  Health Maintenance Due   Topic Date Due     HIV SCREEN (SYSTEM ASSIGNED)  10/30/1988     PAP SCREENING Q3 YR (SYSTEM ASSIGNED)  04/25/2018         Suggested websites for health information:  Www.591wed.Actimo : Up to date and easily searchable information on multiple topics.  Www.medlineplus.gov : medication info, interactive tutorials, watch real surgeries online  Www.familydoctor.org : good info from the Academy of Family Physicians  Www.cdc.gov : public health info, travel advisories, epidemics (H1N1)  Www.aap.org : children's health info, normal development, vaccinations  Www.health.state.[place].us : [place] dept of health, public health issues in [place], N1N1    Your care team:                            Family Medicine Internal Medicine   MD Nathaniel Piña MD Shantel Branch-Fleming, MD Katya Georgiev PA-C Nam Ho, MD Pediatrics   SHRADDHA Turpin, MD Siria Conti CNP, MD Deborah Mielke, MD Kim Thein, APRN CNP      Clinic hours: Monday - Thursday 7 am-7  pm; Fridays 7 am-5 pm.   Urgent care: Monday - Friday 11 am-9 pm; Saturday and Sunday 9 am-5 pm.  Pharmacy : Monday -Thursday 8 am-8 pm; Friday 8 am-6 pm; Saturday and Sunday 9 am-5 pm.     Clinic: [phone]   Pharmacy: [phone]

## 2018-07-11 NOTE — PROGRESS NOTES
"  SUBJECTIVE:   Siri Vyas is a 47 year old female who presents to clinic today for the following health issues:      Joint Pain    Onset: 2 weeks     Description:   Location: back of  left knee  Character: Sharp, Dull ache and Stabbing    Intensity: moderate, severe    Progression of Symptoms: worse    Accompanying Signs & Symptoms:  Other symptoms: tingling, swelling and redness    History:   Previous similar pain: no       Precipitating factors:   Trauma or overuse: no     Alleviating factors:  Improved by: nothing    Therapies Tried and outcome: Ice, elevation, Ibuprofen: no relieve        47 year old female presents with posterior left knee pain x2 weeks as above. Started working out in March doing elliptical and walking. Working out \"pretty heavy.\" Hx PE in  which was thought to be from OCP. No calf pain. Has been resting x2 weeks, icing elevating, tylenol/ibuprofen. A little swelling to posterior knee. Posterior knee painful to extend. Stairs and walking are painful.    Also has a \"mole\" on her back.  noticed it recently when they were swimming. Hasn't changed. Looks white. No itching. No hx skin cancer.    Problem list and histories reviewed & adjusted, as indicated.  Additional history: as documented    Patient Active Problem List   Diagnosis     Seasonal allergic rhinitis     CARDIOVASCULAR SCREENING; LDL GOAL LESS THAN 160     S/P cholecystectomy     Dermoid cyst of ovary     H/O:  section     LUQ abdominal pain     Paresthesias/numbness     Median neuropathy     Epicondylitis, medial humeral     Tendinitis of left wrist     Headache     Chondromalacia, knee, left     Chondromalacia patellae, right     CTS (carpal tunnel syndrome)     Family history of malignant neoplasm of breast     Class 2 obesity due to excess calories without serious comorbidity with body mass index (BMI) of 39.0 to 39.9 in adult     Past Surgical History:   Procedure Laterality Date     Dermoid cyst  (OVARY) " removal  1997     GYN SURGERY  ,,,          LAPAROSCOPIC CHOLECYSTECTOMY  2001     SINUS SURGERY  2002    Nasls sinuses     TONSILLECTOMY  1999       Social History   Substance Use Topics     Smoking status: Never Smoker     Smokeless tobacco: Never Used      Comment: no second hand smoke     Alcohol use Yes      Comment: 1-2 days in a week. Each time 1-2 beers. 0-5 beers weeks     Family History   Problem Relation Age of Onset     Breast Cancer Mother      Lipids Mother      Hypertension Mother      Hearing Loss Mother      Lung Cancer Mother      2017     Lipids Brother      Cerebrovascular Disease Maternal Grandmother 76     Neurologic Disorder Son 14     migraines     Breast Cancer Maternal Aunt 70     Breast Cancer Maternal Aunt 80         Current Outpatient Prescriptions   Medication Sig Dispense Refill     acetaminophen (TYLENOL) 325 MG tablet Take 325-650 mg by mouth every 6 hours as needed       fexofenadine (ALLEGRA) 180 MG tablet Take  by mouth. 1 TABLET DAILY FOR ALLERGIES 90 tablet 0     GLUCOSAMINE-CHONDROITIN PO        Multiple Vitamin (MULTIVITAMINS PO)        Omega-3 Fatty Acids (OMEGA-3 FISH OIL PO)        methylPREDNISolone (MEDROL DOSEPAK) 4 MG tablet Follow package instructions (Patient not taking: Reported on 2017) 21 tablet 2     Allergies   Allergen Reactions     Dust Mites      No Clinical Screening - See Comments Other (See Comments)     No Known Drug Allergy      Seasonal Allergies        Reviewed and updated as needed this visit by clinical staff  Tobacco  Allergies  Meds  Problems  Med Hx  Surg Hx  Fam Hx  Soc Hx        Reviewed and updated as needed this visit by Provider  Allergies  Meds  Problems         ROS:  Constitutional, HEENT, cardiovascular, pulmonary, gi and gu systems are negative, except as otherwise noted.    OBJECTIVE:     /77 (BP Location: Right arm, Patient Position: Chair, Cuff Size: Adult Large)  Pulse 73  Temp 98.1  " F (36.7  C) (Oral)  Resp 16  Ht 5' 3.58\" (1.615 m)  Wt 206 lb (93.4 kg)  LMP 07/01/2018 (Exact Date)  SpO2 99%  Breastfeeding? No  BMI 35.83 kg/m2  Body mass index is 35.83 kg/(m^2).  GENERAL: healthy, alert and no distress  MS: no gross musculoskeletal defects noted, no edema. no bakers cyst noted bilaterally. Pain with extension of left knee.  Pain with valgus maneuver of left knee.   SKIN:  2 mm whitish roughly scaled macule to left back just above braline  PSYCH: mentation appears normal, affect normal/bright    Diagnostic Test Results:  none     Lesion to back was frozen with cryotherapy. Obtained verbal consent. 3 freeze/thaw cycles. Applied bacitracin + Band Aid. Patient tolerated well. No complications.    ASSESSMENT/PLAN:     1. Posterior left knee pain  Very low suspicion for DVT. Probable strain of left knee tendons/ligments vs soft tissue injury.  If pain worsening with below measures, calf pain develops, or swelling/redness noted, will obtain left knee US.   Alternate heat and ice for 15 minutes 4-6 times per day  Tylenol 650mg-1000mg every 6-8 hours for pain  Ibuprofen 400-600mg every 6-8 hours for pain/inflammation.   Continue to rest  If no improvement within the next 2 weeks, please let me know and we will refer you to orthopedics.   Contact me through [employer].     2. Actinic keratosis  Cryotherapy today in clinic.   If no improvement in few weeks, will refer to derm      See Patient Instructions    The benefits, risks and potential side effects were discussed in detail. Black box warnings discussed as relevant. All patient questions were answered. The patient was instructed to follow up immediately if any adverse reactions develop.    Patient verbalizes understanding and agrees with plan of care. Patient stable for discharge.      QUANG Lam [facility]  "

## 2018-07-11 NOTE — MR AVS SNAPSHOT
After Visit Summary   7/11/2018    Siri Vyas    MRN: [id number]           Patient Information     Date Of Birth          1970        Visit Information        Provider Department      7/11/2018 8:20 AM Cristiane Carpio APRN CNP [facility]        Today's Diagnoses     Posterior left knee pain    -  1    Actinic keratosis          Care Instructions    Knee  Alternate heat and ice for 15 minutes 4-6 times per day  Tylenol 650mg-1000mg every 6-8 hours for pain  Ibuprofen 400-600mg every 6-8 hours for pain/inflammation.   Continue to rest  If no improvement within the next 2 weeks, please let me know and we will refer you to orthopedics.   Contact me through [employer].     Actinic keratosis (back lesion)  Freeze today with liquid nitrogen.   If no improvement in the next couple weeks (does not go away), let me know and we will refer you to dermatology.         At [facility], we strive to deliver an exceptional experience to you, every time we see you.  If you receive a survey in the mail, please send us back your thoughts. We really do value your feedback.    Based on your medical history, these are the current health maintenance/preventive care services that you are due for (some may have been done at this visit.)  Health Maintenance Due   Topic Date Due     HIV SCREEN (SYSTEM ASSIGNED)  10/30/1988     PAP SCREENING Q3 YR (SYSTEM ASSIGNED)  04/25/2018         Suggested websites for health information:  Www.[employer].org : Up to date and easily searchable information on multiple topics.  Www.medlineplus.gov : medication info, interactive tutorials, watch real surgeries online  Www.familydoctor.org : good info from the Academy of Family Physicians  Www.cdc.gov : public health info, travel advisories, epidemics (H1N1)  Www.aap.org : children's health info, normal development, vaccinations  Www.health.state.[place].us : [place] dept of health, public health issues in  [place], N1N1    Your care team:                            Family Medicine Internal Medicine   MD Nathaniel Piña MD Shantel Branch-Fleming, MD Katya Georgiev PA-C Nam Ho, MD Pediatrics   SHRADDHA Turpin, RAMONE Peacock APRN MD Siria Reed MD Deborah Mielke, MD Kim Thein, APRN [facility]      Clinic hours: Monday - Thursday 7 am-7 pm; Fridays 7 am-5 pm.   Urgent care: Monday - Friday 11 am-9 pm; Saturday and Sunday 9 am-5 pm.  Pharmacy : Monday -Thursday 8 am-8 pm; Friday 8 am-6 pm; Saturday and Sunday 9 am-5 pm.     Clinic: [phone]   Pharmacy: [phone]            Follow-ups after your visit        Who to contact     If you have questions or need follow up information about today's clinic visit or your schedule please contact [facility] directly at [phone].  Normal or non-critical lab and imaging results will be communicated to you by MyChart, letter or phone within 4 business days after the clinic has received the results. If you do not hear from us within 7 days, please contact the clinic through [facility] or phone. If you have a critical or abnormal lab result, we will notify you by phone as soon as possible.  Submit refill requests through [employer] or call your pharmacy and they will forward the refill request to us. Please allow 3 business days for your refill to be completed.          Additional Information About Your Visit        MyChart Information     [employer] gives you secure access to your electronic health record. If you see a primary care provider, you can also send messages to your care team and make appointments. If you have questions, please call your primary care clinic.  If you do not have a primary care provider, please call [phone] and they will assist you.        Care EveryWhere ID     This is your Care EveryWhere ID. This could be used by other organizations to access your [facility]  "records  [id number]        Your Vitals Were     Pulse Temperature Respirations Height Last Period Pulse Oximetry    73 98.1  F (36.7  C) (Oral) 16 5' 3.58\" (1.615 m) 07/01/2018 (Exact Date) 99%    Breastfeeding? BMI (Body Mass Index)                No 35.83 kg/m2           Blood Pressure from Last 3 Encounters:   07/11/18 119/77   03/15/18 119/73   11/01/17 118/73    Weight from Last 3 Encounters:   07/11/18 206 lb (93.4 kg)   03/15/18 227 lb (103 kg)   11/01/17 229 lb 6.4 oz (104.1 kg)              Today, you had the following     No orders found for display       Primary Care Provider Office Phone # Fax #    Chapito Coleman [phone] [address]  [facility] [address]        Equal Access to Services     [facility]: Hadii aad ku hadasho Soomaali, waaxda luqadaha, qaybta kaalmada adeegyada, waxay jrin hayronny gee . So [facility] [phone].    ATENCIÓN: Si habla español, tiene a brandt disposición servicios gratuitos de asistencia lingüística. [facility] [phone].    We comply with applicable federal civil rights laws and [place] laws. We do not discriminate on the basis of race, color, national origin, age, disability, sex, sexual orientation, or gender identity.            Thank you!     Thank you for choosing [facility]  for your care. Our goal is always to provide you with excellent care. Hearing back from our patients is one way we can continue to improve our services. Please take a few minutes to complete the written survey that you may receive in the mail after your visit with us. Thank you!             Your Updated Medication List - Protect others around you: Learn how to safely use, store and throw away your medicines at www.disposemymeds.org.          This list is accurate as of 7/11/18  8:47 AM.  Always use your most recent med list.                   Brand Name Dispense Instructions for use Diagnosis    fexofenadine 180 MG tablet    ALLEGRA "    90 tablet    Take  by mouth. 1 TABLET DAILY FOR ALLERGIES    Seasonal allergic rhinitis       GLUCOSAMINE-CHONDROITIN PO           methylPREDNISolone 4 MG tablet    MEDROL DOSEPAK    21 tablet    Follow package instructions    Pain and swelling of right knee       MULTIVITAMINS PO           OMEGA-3 FISH OIL PO           TYLENOL 325 MG tablet   Generic drug:  acetaminophen      Take 325-650 mg by mouth every 6 hours as needed

## 2018-07-16 ENCOUNTER — RADIANT APPOINTMENT (OUTPATIENT)
Dept: GENERAL RADIOLOGY | Facility: CLINIC | Age: 48
End: 2018-07-16
Attending: NURSE PRACTITIONER
Payer: COMMERCIAL

## 2018-07-16 ENCOUNTER — OFFICE VISIT (OUTPATIENT)
Dept: FAMILY MEDICINE | Facility: CLINIC | Age: 48
End: 2018-07-16
Payer: COMMERCIAL

## 2018-07-16 VITALS
OXYGEN SATURATION: 95 % | TEMPERATURE: 98.1 F | WEIGHT: 209 LBS | BODY MASS INDEX: 35.68 KG/M2 | SYSTOLIC BLOOD PRESSURE: 107 MMHG | DIASTOLIC BLOOD PRESSURE: 70 MMHG | HEIGHT: 64 IN | HEART RATE: 75 BPM

## 2018-07-16 DIAGNOSIS — M25.561 ACUTE PAIN OF BOTH KNEES: ICD-10-CM

## 2018-07-16 DIAGNOSIS — M25.561 ACUTE PAIN OF BOTH KNEES: Primary | ICD-10-CM

## 2018-07-16 DIAGNOSIS — M25.562 ACUTE PAIN OF BOTH KNEES: Primary | ICD-10-CM

## 2018-07-16 DIAGNOSIS — M79.605 PAIN IN POSTERIOR LEFT LOWER EXTREMITY: ICD-10-CM

## 2018-07-16 DIAGNOSIS — M25.562 ACUTE PAIN OF BOTH KNEES: ICD-10-CM

## 2018-07-16 PROCEDURE — 99213 OFFICE O/P EST LOW 20 MIN: CPT | Performed by: NURSE PRACTITIONER

## 2018-07-16 PROCEDURE — 73560 X-RAY EXAM OF KNEE 1 OR 2: CPT | Mod: LT

## 2018-07-16 ASSESSMENT — PAIN SCALES - GENERAL: PAINLEVEL: SEVERE PAIN (7)

## 2018-07-16 NOTE — MR AVS SNAPSHOT
After Visit Summary   7/16/2018    Siri Vyas    MRN: [id number]           Patient Information     Date Of Birth          1970        Visit Information        Provider Department      7/16/2018 4:20 PM Cristiane Carpio APRN CNP [facility]        Today's Diagnoses     Acute pain of both knees    -  1    Pain in posterior left lower extremity          Care Instructions    Ice 15 minutes 4-6 times daily  Tylenol or ibuprofen as needed for pain  Please schedule ultrasound to rule out DVT  Referral placed for orthopedics - they will call you to schedule  Please follow up with Dr. Cunningham    At [facility], we strive to deliver an exceptional experience to you, every time we see you.  If you receive a survey in the mail, please send us back your thoughts. We really do value your feedback.    Based on your medical history, these are the current health maintenance/preventive care services that you are due for (some may have been done at this visit.)  Health Maintenance Due   Topic Date Due     HIV SCREEN (SYSTEM ASSIGNED)  10/30/1988     PAP SCREENING Q3 YR (SYSTEM ASSIGNED)  04/25/2018         Suggested websites for health information:  W[employer] : Up to date and easily searchable information on multiple topics.  Www.medlineplus.gov : medication info, interactive tutorials, watch real surgeries online  Www.familydoctor.org : good info from the Academy of Family Physicians  Www.cdc.gov : public health info, travel advisories, epidemics (H1N1)  Www.aap.org : children's health info, normal development, vaccinations  Www.health.state.[place].us : [place] dept of health, public health issues in [place], N1N1    Your care team:                            Family Medicine Internal Medicine   MD Nathaniel Piña MD Shantel Branch-Fleming, MD Katya Georgiev PA-C Nam Ho, MD Pediatrics   SHRADDHA Turpin, RAMONE DEL RIO CNP    MD Siria Mittal MD Deborah Mielke, MD Kim Thein, APRN CNP      Clinic hours: Monday - Thursday 7 am-7 pm; Fridays 7 am-5 pm.   Urgent care: Monday - Friday 11 am-9 pm; Saturday and Sunday 9 am-5 pm.  Pharmacy : Monday -Thursday 8 am-8 pm; Friday 8 am-6 pm; Saturday and Sunday 9 am-5 pm.     Clinic: [phone]   Pharmacy: [phone]            Follow-ups after your visit        Additional Services     ORTHO  REFERRAL       [facility] is referring you to the Orthopedic  Services at [facility].       The  Representative will assist you in the coordination of your Orthopedic and Musculoskeletal Care as prescribed by your physician.    The  Representative will call you within 1 business day to help schedule your appointment, or you may contact the  Representative at:    All areas ~ [phone]     Type of Referral : Surgical / Specialist       Timeframe requested: Routine    Coverage of these services is subject to the terms and limitations of your health insurance plan.  Please call member services at your health plan with any benefit or coverage questions.      If X-rays, CT or MRI's have been performed, please contact the facility where they were done to arrange for , prior to your scheduled appointment.  Please bring this referral request to your appointment and present it to your specialist.                  Future tests that were ordered for you today     Open Future Orders        Priority Expected Expires Ordered    US Lower Extremity Venous Duplex Left Routine  7/16/2019 7/16/2018            Who to contact     If you have questions or need follow up information about today's clinic visit or your schedule please contact [facility] [place] directly at [phone].  Normal or non-critical lab and imaging results will be communicated to you by MyChart, letter or phone within 4  "business days after the clinic has received the results. If you do not hear from us within 7 days, please contact the clinic through [employer] or phone. If you have a critical or abnormal lab result, we will notify you by phone as soon as possible.  Submit refill requests through [employer] or call your pharmacy and they will forward the refill request to us. Please allow 3 business days for your refill to be completed.          Additional Information About Your Visit        [employer] Information     [employer] gives you secure access to your electronic health record. If you see a primary care provider, you can also send messages to your care team and make appointments. If you have questions, please call your primary care clinic.  If you do not have a primary care provider, please call [phone] and they will assist you.        Care EveryWhere ID     This is your Care EveryWhere ID. This could be used by other organizations to access your [place] medical records  [id number]        Your Vitals Were     Pulse Temperature Height Last Period Pulse Oximetry Breastfeeding?    75 98.1  F (36.7  C) (Oral) 5' 3.52\" (1.613 m) 07/01/2018 (Exact Date) 95% No    BMI (Body Mass Index)                   36.42 kg/m2            Blood Pressure from Last 3 Encounters:   07/16/18 107/70   07/11/18 119/77   03/15/18 119/73    Weight from Last 3 Encounters:   07/16/18 209 lb (94.8 kg)   07/11/18 206 lb (93.4 kg)   03/15/18 227 lb (103 kg)              We Performed the Following     ORTHO  REFERRAL        Primary Care Provider Office Phone # Fax #    Chapito Coleman [phone]       [address]  [facility] [address]        Equal Access to Services     [facility]: Hadii christine kelly Solidya, waaxda luqadaha, qaybta kaalmada jinda, facundo ring. So [facility] [phone].    ATENCIÓN: Si habla español, tiene a brandt disposición servicios gratuitos de asistencia lingüística. Llame al " [phone].    We comply with applicable federal civil rights laws and [place] laws. We do not discriminate on the basis of race, color, national origin, age, disability, sex, sexual orientation, or gender identity.            Thank you!     Thank you for choosing [facility]  for your care. Our goal is always to provide you with excellent care. Hearing back from our patients is one way we can continue to improve our services. Please take a few minutes to complete the written survey that you may receive in the mail after your visit with us. Thank you!             Your Updated Medication List - Protect others around you: Learn how to safely use, store and throw away your medicines at www.disposemymeds.org.          This list is accurate as of 7/16/18  4:57 PM.  Always use your most recent med list.                   Brand Name Dispense Instructions for use Diagnosis    fexofenadine 180 MG tablet    ALLEGRA    90 tablet    Take  by mouth. 1 TABLET DAILY FOR ALLERGIES    Seasonal allergic rhinitis       GLUCOSAMINE-CHONDROITIN PO           MULTIVITAMINS PO           OMEGA-3 FISH OIL PO           TYLENOL 325 MG tablet   Generic drug:  acetaminophen      Take 325-650 mg by mouth every 6 hours as needed

## 2018-07-16 NOTE — PROGRESS NOTES
SUBJECTIVE:   Siri Vyas is a 47 year old female who presents to clinic today for the following health issues:      Joint Pain    Onset: 1-2 months    Description:   Location: left knee and right knee  Character: Sharp and Dull ache    Intensity: moderate    Progression of Symptoms: same    Accompanying Signs & Symptoms:  Other symptoms: radiation of pain to back of left leg    History:   Previous similar pain: no       Precipitating factors:   Trauma or overuse: no     Alleviating factors:  Improved by: nothing    Therapies Tried and outcome: Ice, Rest, OTC pain meds; no relieve        47 year old female presents for follow up of LE pain as above. We saw her 1 week ago for  same. She reports that she went back and looked at her workout and [employer] data and found that it has been going on fore much longer than she initially thought, more like 1-2 months. Pain is worsening left posterior LE. Hx DVT. In . States the more she thinks about it thinks it started after plane trip to [place]. She reports bilateral knee pain. No known injury. She has always been active. Played soccer in her younger years. She has a hx of chondromalacia of both knees dx in 2017 by Dr. Cunningham. No chest pain, shortness of breath or lightheadedness.    Problem list and histories reviewed & adjusted, as indicated.  Additional history: as documented    Patient Active Problem List   Diagnosis     Seasonal allergic rhinitis     CARDIOVASCULAR SCREENING; LDL GOAL LESS THAN 160     S/P cholecystectomy     Dermoid cyst of ovary     H/O:  section     LUQ abdominal pain     Paresthesias/numbness     Median neuropathy     Epicondylitis, medial humeral     Tendinitis of left wrist     Headache     Chondromalacia, knee, left     Chondromalacia patellae, right     CTS (carpal tunnel syndrome)     Family history of malignant neoplasm of breast     Class 2 obesity due to excess calories without serious comorbidity with body mass  index (BMI) of 39.0 to 39.9 in adult     Past Surgical History:   Procedure Laterality Date     Dermoid cyst  (OVARY) removal       GYN SURGERY  ,,,          LAPAROSCOPIC CHOLECYSTECTOMY       SINUS SURGERY  2002    Nasls sinuses     TONSILLECTOMY  1999       Social History   Substance Use Topics     Smoking status: Never Smoker     Smokeless tobacco: Never Used      Comment: no second hand smoke     Alcohol use Yes      Comment: 1-2 days in a week. Each time 1-2 beers. 0-5 beers weeks     Family History   Problem Relation Age of Onset     Breast Cancer Mother      Lipids Mother      Hypertension Mother      Hearing Loss Mother      Lung Cancer Mother      2017     Lipids Brother      Cerebrovascular Disease Maternal Grandmother 76     Neurologic Disorder Son 14     migraines     Breast Cancer Maternal Aunt 70     Breast Cancer Maternal Aunt 80         Current Outpatient Prescriptions   Medication Sig Dispense Refill     acetaminophen (TYLENOL) 325 MG tablet Take 325-650 mg by mouth every 6 hours as needed       fexofenadine (ALLEGRA) 180 MG tablet Take  by mouth. 1 TABLET DAILY FOR ALLERGIES 90 tablet 0     GLUCOSAMINE-CHONDROITIN PO        Multiple Vitamin (MULTIVITAMINS PO)        Omega-3 Fatty Acids (OMEGA-3 FISH OIL PO)        Allergies   Allergen Reactions     Dust Mites      No Clinical Screening - See Comments Other (See Comments)     No Known Drug Allergy      Seasonal Allergies        Reviewed and updated as needed this visit by clinical staff  Tobacco  Allergies  Meds  Problems  Med Hx  Surg Hx  Fam Hx  Soc Hx        Reviewed and updated as needed this visit by Provider  Allergies  Meds  Problems         ROS:  Constitutional, HEENT, cardiovascular, pulmonary, gi and gu systems are negative, except as otherwise noted.    OBJECTIVE:     /70 (BP Location: Right arm, Patient Position: Chair, Cuff Size: Adult Large)  Pulse 75  Temp 98.1  F (36.7  C)  "(Oral)  Ht 5' 3.52\" (1.613 m)  Wt 209 lb (94.8 kg)  LMP 07/01/2018 (Exact Date)  SpO2 95%  Breastfeeding? No  BMI 36.42 kg/m2  Body mass index is 36.42 kg/(m^2).  GENERAL: healthy, alert and no distress  MS: normal range of motion, RLE exam negative, LLE exam shows pain with eversion and pain with sitting to standing. No swelling. No erythema or ecchymosis.  NEURO: Normal strength and tone, mentation intact and speech normal  PSYCH: mentation appears normal, affect normal/bright    Diagnostic Test Results:  Xray - bilateral knee: normal on my read. Awaiting radiology over read    ASSESSMENT/PLAN:       ICD-10-CM    1. Acute pain of both knees M25.561 XR Knee Bilateral 1/2 Views    M25.562 ORTHO  REFERRAL   2. Pain in posterior left lower extremity M79.605 US Lower Extremity Venous Duplex Left     ORTHO  REFERRAL     X-ray of knees normal. Will get US for posterior LLE pain given hx DVT and symptoms started after plane trip. Will continue supportive interventions and have patient follow up with ortho if normal US and symptoms persist.    Ice 15 minutes 4-6 times daily  Tylenol or ibuprofen as needed for pain  Please schedule ultrasound to rule out DVT  Referral placed for orthopedics - they will call you to schedule  Please follow up with Dr. Cunningham    See Patient Instructions    The benefits, risks and potential side effects were discussed in detail. Black box warnings discussed as relevant. All patient questions were answered. The patient was instructed to follow up immediately if any adverse reactions develop.    Patient verbalizes understanding and agrees with plan of care. Patient stable for discharge.      QUANG Lam [facility]  "

## 2018-07-16 NOTE — PATIENT INSTRUCTIONS
Ice 15 minutes 4-6 times daily  Tylenol or ibuprofen as needed for pain  Please schedule ultrasound to rule out DVT  Referral placed for orthopedics - they will call you to schedule  Please follow up with Dr. Cunningham    At [facility], we strive to deliver an exceptional experience to you, every time we see you.  If you receive a survey in the mail, please send us back your thoughts. We really do value your feedback.    Based on your medical history, these are the current health maintenance/preventive care services that you are due for (some may have been done at this visit.)  Health Maintenance Due   Topic Date Due     HIV SCREEN (SYSTEM ASSIGNED)  10/30/1988     PAP SCREENING Q3 YR (SYSTEM ASSIGNED)  04/25/2018         Suggested websites for health information:  Www.Critical access [email or web address] : Up to date and easily searchable information on multiple topics.  Www.medlineplus.gov : medication info, interactive tutorials, watch real surgeries online  Www.familydoctor.org : good info from the Academy of Family Physicians  Www.cdc.gov : public health info, travel advisories, epidemics (H1N1)  Www.aap.org : children's health info, normal development, vaccinations  Www.health.[facility].[place].us : [place] dept of health, public health issues in [place], N1N1    Your care team:                            Family Medicine Internal Medicine   MD Nathaniel Piña MD Shantel Branch-Fleming, MD Katya Georgiev PA-C Nam Ho, MD Pediatrics   SHRADDHA Turpin, MD Siria Conti CNP, MD Deborah Mielke, MD Kim Thein, APRN CNP      Clinic hours: Monday - Thursday 7 am-7 pm; Fridays 7 am-5 pm.   Urgent care: Monday - Friday 11 am-9 pm; Saturday and Sunday 9 am-5 pm.  Pharmacy : Monday -Thursday 8 am-8 pm; Friday 8 am-6 pm; Saturday and Sunday 9 am-5 pm.     Clinic: [phone]   Pharmacy: [phone]

## 2018-07-17 ENCOUNTER — RADIANT APPOINTMENT (OUTPATIENT)
Dept: ULTRASOUND IMAGING | Facility: CLINIC | Age: 48
End: 2018-07-17
Attending: NURSE PRACTITIONER
Payer: COMMERCIAL

## 2018-07-17 ENCOUNTER — TELEPHONE (OUTPATIENT)
Dept: FAMILY MEDICINE | Facility: CLINIC | Age: 48
End: 2018-07-17

## 2018-07-17 DIAGNOSIS — M79.605 PAIN IN POSTERIOR LEFT LOWER EXTREMITY: ICD-10-CM

## 2018-07-17 DIAGNOSIS — I82.812 SAPHENOUS VEIN CLOT, LEFT: Primary | ICD-10-CM

## 2018-07-17 LAB — RADIOLOGIST FLAGS: ABNORMAL

## 2018-07-17 PROCEDURE — 93971 EXTREMITY STUDY: CPT | Mod: LT | Performed by: RADIOLOGY

## 2018-07-17 NOTE — TELEPHONE ENCOUNTER
Lor from [facility] calling the office on behalf of [facility] radiology dept to report abnormal finding on ultrasound done today on pt regarding LLE.     [Access Center: No left leg deep venous thrombosis. Superficial venous  thrombus in the left mid small saphenous vein corresponds to the area  of the patient's reported pain.]  Component Results   Component   Radiologist flags (Rad-Urgent Abnormal)   No left leg deep venous thrombosis. Superficial venous     Provider already been made aware of results. Routing to provider for recommendation(s)    Elisa Lowry RN  [facility] Triage

## 2018-07-17 NOTE — TELEPHONE ENCOUNTER
"Please call patient and let her know her US showed \"mid left small saphenous vein occlusive thrombus which corresponds to the patient's area of pain. The proximal and distal portions of the left small saphenous vein remain fully compressible.\" There is no deep vein thrombosis.    I talked with patient's primary care provider who is listed as Dr. Coleman. He recommends started aspirin 325 mg daily and follow up in clinic with him for workup as to why she continues to get clots (this is her second). I have sent aspirin 325 mg to the pharmacy ([facility]).   "

## 2018-07-17 NOTE — TELEPHONE ENCOUNTER
This writer attempted to contact Siri on 07/17/18      Reason for call abnormal ultrasound results and left message.      If patient calls back:   Stefan Jacobo RN, BSN

## 2018-07-18 NOTE — TELEPHONE ENCOUNTER
Called and spoke with patient. Ultrasound results given. Discussed care plan with patient. She is to take 325 mg ASA daily, staring now, and to follow up with Dr. Coleman in clinic to discuss frequency of blood clots and possible further work up regarding this. Pt understanding and agreeable with plan. Pt scheduled to see Dr. Coleman on Friday, July 20 at 9:40 am. Unable to come in sooner due to previous scheduled appts for her mother and her daughter today and tomorrow. RN advised pt that prescription for ASA was sent to [facility] pharmacy in [place]. Per pt, she no longer uses that pharmacy. Now using pharmacy here at [facility]. RN advised would have prescription transferred over to pharmacy here at the clinic then. Pt agreed and will contact pharmacy later today to inquire about  time. No further questions at this time, pt will f/u this Friday and will bring all questions to Dr. Coleman, if has any. Pt advised to contact clinic if having any worsening symptoms in the mean time. Pt agreed.  ELEUTERIO Aviles messaged pharmacist here at [facility] regarding prescription transfer. Pharmacist to contact [facility] and request transfer.    Elisa Lowry RN  [facility] Triage

## 2018-07-18 NOTE — TELEPHONE ENCOUNTER
Patient returned call    Best number to reach caller: Home number on file [phone]    Is it ok to leave a detailed message: YES

## 2018-07-20 ENCOUNTER — OFFICE VISIT (OUTPATIENT)
Dept: FAMILY MEDICINE | Facility: CLINIC | Age: 48
End: 2018-07-20
Payer: COMMERCIAL

## 2018-07-20 VITALS
DIASTOLIC BLOOD PRESSURE: 73 MMHG | OXYGEN SATURATION: 98 % | WEIGHT: 204 LBS | HEART RATE: 75 BPM | RESPIRATION RATE: 18 BRPM | SYSTOLIC BLOOD PRESSURE: 112 MMHG | TEMPERATURE: 98.3 F | BODY MASS INDEX: 35.55 KG/M2

## 2018-07-20 DIAGNOSIS — I80.3 THROMBOPHLEBITIS LEG: Primary | ICD-10-CM

## 2018-07-20 PROCEDURE — 85300 ANTITHROMBIN III ACTIVITY: CPT | Performed by: FAMILY MEDICINE

## 2018-07-20 PROCEDURE — 00000167 ZZHCL STATISTIC INR NC: Performed by: FAMILY MEDICINE

## 2018-07-20 PROCEDURE — 85730 THROMBOPLASTIN TIME PARTIAL: CPT | Performed by: FAMILY MEDICINE

## 2018-07-20 PROCEDURE — 99000 SPECIMEN HANDLING OFFICE-LAB: CPT | Performed by: FAMILY MEDICINE

## 2018-07-20 PROCEDURE — 85613 RUSSELL VIPER VENOM DILUTED: CPT | Performed by: FAMILY MEDICINE

## 2018-07-20 PROCEDURE — 85303 CLOT INHIBIT PROT C ACTIVITY: CPT | Performed by: FAMILY MEDICINE

## 2018-07-20 PROCEDURE — 81241 F5 GENE: CPT | Performed by: FAMILY MEDICINE

## 2018-07-20 PROCEDURE — 85306 CLOT INHIBIT PROT S FREE: CPT | Mod: 90 | Performed by: FAMILY MEDICINE

## 2018-07-20 PROCEDURE — 81240 F2 GENE: CPT | Performed by: FAMILY MEDICINE

## 2018-07-20 PROCEDURE — 99213 OFFICE O/P EST LOW 20 MIN: CPT | Performed by: FAMILY MEDICINE

## 2018-07-20 PROCEDURE — 36415 COLL VENOUS BLD VENIPUNCTURE: CPT | Performed by: FAMILY MEDICINE

## 2018-07-20 PROCEDURE — 00000401 ZZHCL STATISTIC THROMBIN TIME NC: Performed by: FAMILY MEDICINE

## 2018-07-20 NOTE — PROGRESS NOTES
SUBJECTIVE:                                                    Siri Vyas is a 47 year old female who presents to clinic today for the following health issues:    F/U Blood clot on left leg, 2nd time this has happened and wants to discuss a plan to try and treat?    Problem list and histories reviewed & adjusted, as indicated.  Additional history: as documented    Patient Active Problem List   Diagnosis     Seasonal allergic rhinitis     CARDIOVASCULAR SCREENING; LDL GOAL LESS THAN 160     S/P cholecystectomy     Dermoid cyst of ovary     H/O:  section     LUQ abdominal pain     Paresthesias/numbness     Median neuropathy     Epicondylitis, medial humeral     Tendinitis of left wrist     Headache     Chondromalacia, knee, left     Chondromalacia patellae, right     CTS (carpal tunnel syndrome)     Family history of malignant neoplasm of breast     Class 2 obesity due to excess calories without serious comorbidity with body mass index (BMI) of 39.0 to 39.9 in adult     Saphenous vein clot, left     Thrombophlebitis leg (H)     Past Surgical History:   Procedure Laterality Date     ABDOMEN SURGERY  96,98,00,04, 99    4 c-sections, dermoid cyst     Dermoid cyst  (OVARY) removal       GYN SURGERY  ,,,          HERNIA REPAIR       LAPAROSCOPIC CHOLECYSTECTOMY  2001     SINUS SURGERY  2002    Nasls sinuses     TONSILLECTOMY  1999       Social History   Substance Use Topics     Smoking status: Never Smoker     Smokeless tobacco: Never Used      Comment: no second hand smoke     Alcohol use Yes      Comment: 1-2 days in a week. Each time 1-2 beers. 0-5 beers weeks     Family History   Problem Relation Age of Onset     Breast Cancer Mother      Lipids Mother      Hypertension Mother      Hearing Loss Mother      Lung Cancer Mother      2017     Hyperlipidemia Mother      Other Cancer Mother      lung     Lipids Brother      Cerebrovascular Disease Maternal Grandmother 76      Neurologic Disorder Son 14     migraines     Breast Cancer Maternal Aunt 70     Breast Cancer Maternal Aunt 80     Hypertension Brother      Hyperlipidemia Brother      Colon Cancer Cousin      Other Cancer Cousin      Other Cancer Other      breast           ROS:  Constitutional, HEENT, cardiovascular, pulmonary, GI, , musculoskeletal, neuro, skin, endocrine and psych systems are negative, except as otherwise noted.    OBJECTIVE:     /73  Pulse 75  Temp 98.3  F (36.8  C) (Oral)  Resp 18  Wt 204 lb (92.5 kg)  LMP 07/01/2018 (Exact Date)  SpO2 98%  Breastfeeding? No  BMI 35.55 kg/m2  Body mass index is 35.55 kg/(m^2).  GENERAL: healthy, alert and no distress  NECK: no adenopathy, no asymmetry, masses, or scars and thyroid normal to palpation  RESP: lungs clear to auscultation - no rales, rhonchi or wheezes  CV: regular rate and rhythm, normal S1 S2, no S3 or S4, no murmur, click or rub, no peripheral edema and peripheral pulses strong  ABDOMEN: soft, nontender, no hepatosplenomegaly, no masses and bowel sounds normal  MS: no gross musculoskeletal defects noted, no edema    Diagnostic Test Results:  none     ASSESSMENT/PLAN:     1. Thrombophlebitis leg (H)  Left saphenous. Continue with asa 325 mg daily indefinitely. Coagulopathy w/u below. Reviewed w/u back in 2007 but patient was on heparin which can make tests inaccurate. Recheck.  - aspirin 325 MG EC tablet; Take 1 tablet (325 mg) by mouth daily  Dispense: 90 tablet; Refill: 3  - Factor 5 leiden mutation analysis  - Factor 2 and 5 mutation analysis  - Protein C&S Activity w/reflx Atg ([facility])  - Antithrombin III  - Lupus Anticoagulant Panel    See Patient Instructions    Chapito Coleman MD, MD  [facility]

## 2018-07-20 NOTE — MR AVS SNAPSHOT
After Visit Summary   7/20/2018    Siri Vyas    MRN: [id number]           Patient Information     Date Of Birth          1970        Visit Information        Provider Department      7/20/2018 9:40 AM Chapito Coleman MD [facility]        Today's Diagnoses     Thrombophlebitis leg (H)    -  1       Follow-ups after your visit        Who to contact     If you have questions or need follow up information about today's clinic visit or your schedule please contact [facility] directly at [phone].  Normal or non-critical lab and imaging results will be communicated to you by Oleryhart, letter or phone within 4 business days after the clinic has received the results. If you do not hear from us within 7 days, please contact the clinic through Atlassiant or phone. If you have a critical or abnormal lab result, we will notify you by phone as soon as possible.  Submit refill requests through Interview Rocket or call your pharmacy and they will forward the refill request to us. Please allow 3 business days for your refill to be completed.          Additional Information About Your Visit        MyChart Information     [employer] gives you secure access to your electronic health record. If you see a primary care provider, you can also send messages to your care team and make appointments. If you have questions, please call your primary care clinic.  If you do not have a primary care provider, please call [phone] and they will assist you.        Care EveryWhere ID     This is your Care EveryWhere ID. This could be used by other organizations to access your [place] medical records  [id number]        Your Vitals Were     Pulse Temperature Respirations Last Period Pulse Oximetry Breastfeeding?    75 98.3  F (36.8  C) (Oral) 18 07/01/2018 (Exact Date) 98% No    BMI (Body Mass Index)                   35.55 kg/m2            Blood Pressure from Last 3 Encounters:   07/20/18 112/73    07/16/18 107/70   07/11/18 119/77    Weight from Last 3 Encounters:   07/20/18 204 lb (92.5 kg)   07/16/18 209 lb (94.8 kg)   07/11/18 206 lb (93.4 kg)              We Performed the Following     Antithrombin III     Factor 2 and 5 mutation analysis     Factor 5 leiden mutation analysis     Lupus Anticoagulant Panel     Protein C&S Activity w/reflx Atg ([facility])          Where to get your medicines      These [place] [facility] [place] [address]  [address], [facility] [address]     Phone:  [phone]     aspirin 325 MG EC tablet          Primary Care Provider Office Phone # Fax #    Chapito Coleman [phone]       [address]  [facility] [address]        Equal Access to Services     [facility]: Hadii aad ku hadasho Soomaali, waaxda luqadaha, qaybta kaalmada adeegyada, facundo gee . So [facility] [phone].    ATENCIÓN: Si habla español, tiene a brandt disposición servicios gratuitos de asistencia lingüística. Llame al [phone].    We comply with applicable federal civil rights laws and [place] laws. We do not discriminate on the basis of race, color, national origin, age, disability, sex, sexual orientation, or gender identity.            Thank you!     Thank you for choosing [facility]  for your care. Our goal is always to provide you with excellent care. Hearing back from our patients is one way we can continue to improve our services. Please take a few minutes to complete the written survey that you may receive in the mail after your visit with us. Thank you!             Your Updated Medication List - Protect others around you: Learn how to safely use, store and throw away your medicines at www.disposemymeds.org.          This list is accurate as of 7/20/18  9:56 AM.  Always use your most recent med list.                   Brand Name Dispense Instructions for use Diagnosis     aspirin 325 MG EC tablet     90 tablet    Take 1 tablet (325 mg) by mouth daily    Thrombophlebitis leg (H)       fexofenadine 180 MG tablet    ALLEGRA    90 tablet    Take  by mouth. 1 TABLET DAILY FOR ALLERGIES    Seasonal allergic rhinitis       GLUCOSAMINE-CHONDROITIN PO           MULTIVITAMINS PO           OMEGA-3 FISH OIL PO           TYLENOL 325 MG tablet   Generic drug:  acetaminophen      Take 325-650 mg by mouth every 6 hours as needed

## 2018-07-20 NOTE — NURSING NOTE
"Chief Complaint   Patient presents with     Deep Vein Thrombosis     F/U Blood clot per pt     Health Maintenance     orders pended        Initial /73  Pulse 75  Temp 98.3  F (36.8  C) (Oral)  Resp 18  Wt 204 lb (92.5 kg)  LMP 07/01/2018 (Exact Date)  SpO2 98%  Breastfeeding? No  BMI 35.55 kg/m2 Estimated body mass index is 35.55 kg/(m^2) as calculated from the following:    Height as of 7/16/18: 5' 3.52\" (1.613 m).    Weight as of this encounter: 204 lb (92.5 kg).  Medication Reconciliation: complete      Allen Villa MA    "

## 2018-07-23 LAB
AT III ACT/NOR PPP CHRO: 91 % (ref 85–135)
PROT C ACT/NOR PPP CHRO: 125 % (ref 70–170)

## 2018-07-24 LAB — LA PPP-IMP: NEGATIVE

## 2018-07-26 LAB — COPATH REPORT: NORMAL

## 2018-08-09 LAB — MISCELLANEOUS TEST: NORMAL

## 2018-08-14 ENCOUNTER — MYC MEDICAL ADVICE (OUTPATIENT)
Dept: FAMILY MEDICINE | Facility: CLINIC | Age: 48
End: 2018-08-14

## 2018-08-14 DIAGNOSIS — I80.3 THROMBOPHLEBITIS LEG: ICD-10-CM

## 2018-08-15 LAB — LAB SCANNED RESULT: NORMAL

## 2018-09-07 ENCOUNTER — OFFICE VISIT (OUTPATIENT)
Dept: FAMILY MEDICINE | Facility: CLINIC | Age: 48
End: 2018-09-07
Payer: COMMERCIAL

## 2018-09-07 VITALS
HEART RATE: 78 BPM | SYSTOLIC BLOOD PRESSURE: 115 MMHG | OXYGEN SATURATION: 97 % | WEIGHT: 209 LBS | TEMPERATURE: 97.6 F | BODY MASS INDEX: 36.42 KG/M2 | DIASTOLIC BLOOD PRESSURE: 78 MMHG | RESPIRATION RATE: 16 BRPM

## 2018-09-07 DIAGNOSIS — M25.561 CHRONIC PAIN OF BOTH KNEES: Primary | ICD-10-CM

## 2018-09-07 DIAGNOSIS — G89.29 CHRONIC PAIN OF BOTH KNEES: Primary | ICD-10-CM

## 2018-09-07 DIAGNOSIS — M25.562 CHRONIC PAIN OF BOTH KNEES: Primary | ICD-10-CM

## 2018-09-07 DIAGNOSIS — M54.50 ACUTE LEFT-SIDED LOW BACK PAIN WITHOUT SCIATICA: ICD-10-CM

## 2018-09-07 DIAGNOSIS — F43.21 GRIEF REACTION: ICD-10-CM

## 2018-09-07 PROCEDURE — 99214 OFFICE O/P EST MOD 30 MIN: CPT | Performed by: NURSE PRACTITIONER

## 2018-09-07 RX ORDER — LIDOCAINE 50 MG/G
2 OINTMENT TOPICAL 4 TIMES DAILY PRN
Qty: 50 G | Refills: 1 | Status: SHIPPED | OUTPATIENT
Start: 2018-09-07 | End: 2020-10-14

## 2018-09-07 RX ORDER — METHOCARBAMOL 750 MG/1
750 TABLET, FILM COATED ORAL 3 TIMES DAILY PRN
Qty: 20 TABLET | Refills: 1 | Status: SHIPPED | OUTPATIENT
Start: 2018-09-07 | End: 2020-10-14

## 2018-09-07 ASSESSMENT — PAIN SCALES - GENERAL: PAINLEVEL: NO PAIN (0)

## 2018-09-07 NOTE — PROGRESS NOTES
SUBJECTIVE:   Siri Vyas is a 47 year old female who presents to clinic today for the following health issues:    Follow up on leg pain  Onset: x months    Description:   Having difficulty walking, pain in the back of both legs    Intensity: 7/10    Progression of Symptoms:  worsening    Accompanying Signs & Symptoms:  Lower back pain left side    Previous history of similar problem:   none    Precipitating factors:   Worsened by: sitting down for a long period of time. Bending.    Alleviating factors:  Improved by: none    Therapies Tried and outcome: ibuprofen and tylenol but doesn't seem to help.    See previous notes for evaluation of leg pain.    LEFT LOWER EXTREMITY DUPLEX VENOUS ULTRASOUND 2018  IMPRESSION:  1. No deep venous thrombosis demonstrated in the left leg.     2. Mid left small saphenous vein occlusive thrombus corresponds to the  patient's area of pain. The proximal and distal portions of the left  small saphenous vein remain fully compressible.    XR BILATERAL KNEE ONE-TWO VIEWS  2018 4:53 PM       HISTORY: Worsening posterior knee pain x1-2 months, left greater than.  Acute pain of both knees.       COMPARISON: None.         IMPRESSION: No acute fracture or dislocation. Mild osteoarthritis  Bilaterally.    Feels pain is worsening.  She does admit to some swelling of the knees posteriorly bilaterally.  Upset because she has stopped exercising entirely due to the pain.  She gained back the weight she had lost. States she knows this is only making her pain worse.  Pain worse when knees are in full extension or flexion, when she is climbing stairs, or after sitting for long periods.      Problem list and histories reviewed & adjusted, as indicated.  Additional history: as documented    Patient Active Problem List   Diagnosis     Seasonal allergic rhinitis     CARDIOVASCULAR SCREENING; LDL GOAL LESS THAN 160     S/P cholecystectomy     Dermoid cyst of ovary     H/O:  section      LUQ abdominal pain     Paresthesias/numbness     Median neuropathy     Epicondylitis, medial humeral     Tendinitis of left wrist     Headache     Chondromalacia, knee, left     Chondromalacia patellae, right     CTS (carpal tunnel syndrome)     Family history of malignant neoplasm of breast     Class 2 obesity due to excess calories without serious comorbidity with body mass index (BMI) of 39.0 to 39.9 in adult     Saphenous vein clot, left     Thrombophlebitis leg (H)     Chronic pain of both knees     Acute left-sided low back pain without sciatica     Past Surgical History:   Procedure Laterality Date     ABDOMEN SURGERY  96,98,00,04, 99    4 c-sections, dermoid cyst     Dermoid cyst  (OVARY) removal       GYN SURGERY  ,,,          HERNIA REPAIR       LAPAROSCOPIC CHOLECYSTECTOMY  2001     SINUS SURGERY  2002    Nasls sinuses     TONSILLECTOMY  1999       Social History   Substance Use Topics     Smoking status: Never Smoker     Smokeless tobacco: Never Used      Comment: no second hand smoke     Alcohol use Yes      Comment: 1-2 days in a week. Each time 1-2 beers. 0-5 beers weeks     Family History   Problem Relation Age of Onset     Breast Cancer Mother      Lipids Mother      Hypertension Mother      Hearing Loss Mother      Lung Cancer Mother      2017     Hyperlipidemia Mother      Other Cancer Mother      lung     Lipids Brother      Cerebrovascular Disease Maternal Grandmother 76     Neurologic Disorder Son 14     migraines     Breast Cancer Maternal Aunt 70     Breast Cancer Maternal Aunt 80     Hypertension Brother      Hyperlipidemia Brother      Colon Cancer Cousin      Other Cancer Cousin      Other Cancer Other      breast         Current Outpatient Prescriptions   Medication Sig Dispense Refill     acetaminophen (TYLENOL) 325 MG tablet Take 325-650 mg by mouth every 6 hours as needed       aspirin 325 MG EC tablet Take 1 tablet (325 mg) by mouth daily 90  tablet 3     fexofenadine (ALLEGRA) 180 MG tablet Take  by mouth. 1 TABLET DAILY FOR ALLERGIES 90 tablet 0     GLUCOSAMINE-CHONDROITIN PO        lidocaine (XYLOCAINE) 5 % ointment Apply 2 g topically 4 times daily as needed for moderate pain 50 g 1     methocarbamol (ROBAXIN) 750 MG tablet Take 1 tablet (750 mg) by mouth 3 times daily as needed for muscle spasms 20 tablet 1     Multiple Vitamin (MULTIVITAMINS PO)        Omega-3 Fatty Acids (OMEGA-3 FISH OIL PO)        Allergies   Allergen Reactions     Dust Mites      No Clinical Screening - See Comments Other (See Comments)     No Known Drug Allergy      Seasonal Allergies      BP Readings from Last 3 Encounters:   09/07/18 115/78   07/20/18 112/73   07/16/18 107/70    Wt Readings from Last 3 Encounters:   09/07/18 209 lb (94.8 kg)   07/20/18 204 lb (92.5 kg)   07/16/18 209 lb (94.8 kg)            Reviewed and updated as needed this visit by clinical staff  Tobacco  Allergies  Meds  Problems       Reviewed and updated as needed this visit by Provider  Allergies  Meds  Problems         ROS:  Constitutional, HEENT, cardiovascular, pulmonary, gi and gu systems are negative, except as otherwise noted.    OBJECTIVE:     /78 (BP Location: Left arm, Patient Position: Chair, Cuff Size: Adult Large)  Pulse 78  Temp 97.6  F (36.4  C) (Oral)  Resp 16  Wt 209 lb (94.8 kg)  LMP 08/22/2018 (Approximate)  SpO2 97%  BMI 36.42 kg/m2  Body mass index is 36.42 kg/(m^2).  GENERAL: healthy, alert and no distress  EYES: Eyes grossly normal to inspection, PERRL and conjunctivae and sclerae normal  HENT: ear canals and TM's normal, nose and mouth without ulcers or lesions  NECK: no adenopathy, no asymmetry, masses, or scars and thyroid normal to palpation  RESP: lungs clear to auscultation - no rales, rhonchi or wheezes  CV: regular rate and rhythm, normal S1 S2, no S3 or S4, no murmur, click or rub, no peripheral edema and peripheral pulses strong  ABDOMEN: soft,  nontender, no hepatosplenomegaly, no masses and bowel sounds normal  MS: RLE exam shows mild posterior and lateral effusion.  Pain on flexion and full extension, no crepitus, normal stabiilty and LLE exam shows  mild posterior and lateral effusion.  Pain on flexion and full extension, no crepitus, normal stabiilty  BACK: +lumbar paraspinal muscle spasm, normal strength, sensation, and reflexes in bilateral upper and lower extremities, neg SLR bilaterally  PSYCH: mentation appears normal and tearful when talking about her mother's cancer.    Diagnostic Test Results:  none     ASSESSMENT/PLAN:     1. Chronic pain of both knees  Patient's pain is more consistent with that of arthritis versus sequela from the thrombophlebitis.  Will send to ortho for further evaluation and possible injections.    - ORTHOPEDICS ADULT REFERRAL  - lidocaine (XYLOCAINE) 5 % ointment; Apply 2 g topically 4 times daily as needed for moderate pain  Dispense: 50 g; Refill: 1    2. Acute left-sided low back pain without sciatica  Plan as below.  Would benefit from physical therapy but does not have time currently   - methocarbamol (ROBAXIN) 750 MG tablet; Take 1 tablet (750 mg) by mouth 3 times daily as needed for muscle spasms  Dispense: 20 tablet; Refill: 1  - lidocaine (XYLOCAINE) 5 % ointment; Apply 2 g topically 4 times daily as needed for moderate pain  Dispense: 50 g; Refill: 1    3. Grief reaction  To mother's cancer diagnosis.  Declines therapy or medication at this time.      Patient Instructions     Can try muscle relaxer at night (or during the day if you are not too drowsy) as needed for back pain.  Recommend follow up with your chiropractor as well.    Lidocaine ointment as needed for knee or back pain.    Follow-up with ortho for possible knee injections.    At [facility], we strive to deliver an exceptional experience to you, every time we see you.  If you receive a survey in the mail, please send us back  your thoughts. We really do value your feedback.    Based on your medical history, these are the current health maintenance/preventive care services that you are due for (some may have been done at this visit.)  Health Maintenance Due   Topic Date Due     HIV SCREEN (SYSTEM ASSIGNED)  10/30/1988     PAP SCREENING Q3 YR (SYSTEM ASSIGNED)  04/25/2018     INFLUENZA VACCINE (1) 09/01/2018       Suggested websites for health information:  Www.[employer] : Up to date and easily searchable information on multiple topics.  Www.medlineplus.gov : medication info, interactive tutorials, watch real surgeries online  Www.familydoctor.org : good info from the Academy of Family Physicians  Www.cdc.gov : public health info, travel advisories, epidemics (H1N1)  Www.aap.org : children's health info, normal development, vaccinations  Www.health.[facility].[place] : [place] dept of health, public health issues in [place], N1N1    Your care team:                            Family Medicine Internal Medicine   MD Nathaniel Piña MD Shantel Branch-Fleming, MD Katya Georgiev PA-C Megan Hill, APRN CNP Nam Ho, MD Pediatrics   SHRADDHA Turpin, MD Sandhya Ledezma CNP, MD Bethany Templen, MD Deborah Mielke, MD Kim Thein, QUANG [facility]      Clinic hours: Monday - Thursday 7 am-7 pm; Fridays 7 am-5 pm.   Urgent care: Monday - Friday 11 am-9 pm; Saturday and Sunday 9 am-5 pm.  Pharmacy : Monday -Thursday 8 am-8 pm; Friday 8 am-6 pm; Saturday and Sunday 9 am-5 pm.     Clinic: [phone]   Pharmacy: [phone]          Alba Lim, QUANG PACK  [facility]

## 2018-09-07 NOTE — MR AVS SNAPSHOT
After Visit Summary   9/7/2018    Siri Vyas    MRN: [id number]           Patient Information     Date Of Birth          1970        Visit Information        Provider Department      9/7/2018 2:00 PM Alba Lim APRN CNP [facility]        Today's Diagnoses     Chronic pain of both knees    -  1    Acute left-sided low back pain without sciatica          Care Instructions    Can try muscle relaxer at night (or during the day if you are not too drowsy) as needed for back pain.  Recommend follow up with your chiropractor as well.    Lidocaine ointment as needed for knee or back pain.    Follow-up with ortho for possible knee injections.    At [facility], we strive to deliver an exceptional experience to you, every time we see you.  If you receive a survey in the mail, please send us back your thoughts. We really do value your feedback.    Based on your medical history, these are the current health maintenance/preventive care services that you are due for (some may have been done at this visit.)  Health Maintenance Due   Topic Date Due     HIV SCREEN (SYSTEM ASSIGNED)  10/30/1988     PAP SCREENING Q3 YR (SYSTEM ASSIGNED)  04/25/2018     INFLUENZA VACCINE (1) 09/01/2018       Suggested websites for health information:  Www.[employer] : Up to date and easily searchable information on multiple topics.  Www.medlineplus.gov : medication info, interactive tutorials, watch real surgeries online  Www.familydoctor.org : good info from the Academy of Family Physicians  Www.cdc.gov : public health info, travel advisories, epidemics (H1N1)  Www.aap.org : children's health info, normal development, vaccinations  Www.health.state.[place] : [place] dept of health, public health issues in [place], N1N1    Your care team:                            Family Medicine Internal Medicine   MD Nathaniel Piña MD Shantel Branch-Fleming, MD Katya Georgiev  SHRADDHA Carpio, APRN CNP    Chapito Coleman MD Pediatrics   Eric Arriola, SHRADDHA Lim, CNP MD Sandhya Jackson APRN CNP   MD Siria Mittal MD Deborah Mielke, MD Kim Thein, APRN [facility]      Clinic hours: Monday - Thursday 7 am-7 pm; Fridays 7 am-5 pm.   Urgent care: Monday - Friday 11 am-9 pm; Saturday and Sunday 9 am-5 pm.  Pharmacy : Monday -Thursday 8 am-8 pm; Friday 8 am-6 pm; Saturday and Sunday 9 am-5 pm.     Clinic: [phone]   Pharmacy: [phone]              Follow-ups after your visit        Additional Services     ORTHOPEDICS ADULT REFERRAL       Your provider has referred you to: [facility]: [facility] [phone]    [email or web address]  [facility]: [facility] [phone]    [email or web address]    Please consider joint injection for patient's knee pain    Please be aware that coverage of these services is subject to the terms and limitations of your health insurance plan.  Call member services at your health plan with any benefit or coverage questions.      Please bring the following to your appointment:    >>   Any x-rays, CTs or MRIs which have been performed.  Contact the facility where they were done to arrange for  prior to your scheduled appointment.    >>   List of current medications   >>   This referral request   >>   Any documents/labs given to you for this referral                  Who to contact     If you have questions or need follow up information about today's clinic visit or your schedule please [facility] at [phone].  Normal or non-critical lab and imaging results will be communicated to you by MyChart, letter or phone within 4 business days after the clinic has received the results. If you do not hear from us within 7 days, please contact the clinic through MyChart or phone. If you have a critical  or abnormal lab result, we will notify you by phone as soon as possible.  Submit refill requests through [employer] or call your pharmacy and they will forward the refill request to us. Please allow 3 business days for your refill to be completed.          Additional Information About Your Visit        [employer] Information     [employer] gives you secure access to your electronic health record. If you see a primary care provider, you can also send messages to your care team and make appointments. If you have questions, please call your primary care clinic.  If you do not have a primary care provider, please call [phone] and they will assist you.        Care EveryWhere ID     This is your Care EveryWhere ID. This could be used by other organizations to access your [facility] medical records  [id number]        Your Vitals Were     Pulse Temperature Respirations Last Period Pulse Oximetry BMI (Body Mass Index)    78 97.6  F (36.4  C) (Oral) 16 08/22/2018 (Approximate) 97% 36.42 kg/m2       Blood Pressure from Last 3 Encounters:   09/07/18 115/78   07/20/18 112/73   07/16/18 107/70    Weight from Last 3 Encounters:   09/07/18 209 lb (94.8 kg)   07/20/18 204 lb (92.5 kg)   07/16/18 209 lb (94.8 kg)              We Performed the Following     ORTHOPEDICS ADULT REFERRAL          Today's Medication Changes          These changes are accurate as of 9/7/18  2:22 PM.  If you have any questions, ask your nurse or doctor.               Start taking these medicines.        Dose/Directions    lidocaine 5 % ointment   Commonly known as:  XYLOCAINE   Used for:  Chronic pain of both knees, Acute left-sided low back pain without sciatica   Started by:  Alba Lim APRN CNP        Dose:  2 g   Apply 2 g topically 4 times daily as needed for moderate pain   Quantity:  50 g   Refills:  1       methocarbamol 750 MG tablet   Commonly known as:  ROBAXIN   Used for:  Acute left-sided low back pain without sciatica   Started by:   Alba Lim, QUANG CNP        Dose:  750 mg   Take 1 tablet (750 mg) by mouth 3 times daily as needed for muscle spasms   Quantity:  20 tablet   Refills:  1            Where to get your medicines      These medications [place] [facility] [place] - [address]  [address], [place] [address]     Phone:  [phone]     lidocaine 5 % ointment    methocarbamol 750 MG tablet                Primary Care Provider Office Phone # Fax #    Chapito Coleman [phone]       [address]  [facility] [place] [address]        Equal Access to Services     [facility]: Hadii christine cross hadasho Soomaali, waaxda luqadaha, qaybta kaalmada adeegyada, facundo gee . So [facility] [phone].    ATENCIÓN: Si habla español, tiene a brandt disposición servicios gratuitos de asistencia lingüística. Llame al [phone].    We comply with applicable federal civil rights laws and [place] laws. We do not discriminate on the basis of race, color, national origin, age, disability, sex, sexual orientation, or gender identity.            Thank you!     Thank you for choosing [facility]  for your care. Our goal is always to provide you with excellent care. Hearing back from our patients is one way we can continue to improve our services. Please take a few minutes to complete the written survey that you may receive in the mail after your visit with us. Thank you!             Your Updated Medication List - Protect others around you: Learn how to safely use, store and throw away your medicines at www.disposemymeds.org.          This list is accurate as of 9/7/18  2:22 PM.  Always use your most recent med list.                   Brand Name Dispense Instructions for use Diagnosis    aspirin 325 MG EC tablet     90 tablet    Take 1 tablet (325 mg) by mouth daily    Thrombophlebitis leg (H)       fexofenadine 180 MG tablet    ALLEGRA    90  tablet    Take  by mouth. 1 TABLET DAILY FOR ALLERGIES    Seasonal allergic rhinitis       GLUCOSAMINE-CHONDROITIN PO           lidocaine 5 % ointment    XYLOCAINE    50 g    Apply 2 g topically 4 times daily as needed for moderate pain    Chronic pain of both knees, Acute left-sided low back pain without sciatica       methocarbamol 750 MG tablet    ROBAXIN    20 tablet    Take 1 tablet (750 mg) by mouth 3 times daily as needed for muscle spasms    Acute left-sided low back pain without sciatica       MULTIVITAMINS PO           OMEGA-3 FISH OIL PO           TYLENOL 325 MG tablet   Generic drug:  acetaminophen      Take 325-650 mg by mouth every 6 hours as needed

## 2018-09-10 PROBLEM — F43.20 GRIEF REACTION: Status: ACTIVE | Noted: 2018-09-10

## 2018-09-10 PROBLEM — F43.21 GRIEF REACTION: Status: ACTIVE | Noted: 2018-09-10

## 2018-09-25 ENCOUNTER — OFFICE VISIT (OUTPATIENT)
Dept: ORTHOPEDICS | Facility: CLINIC | Age: 48
End: 2018-09-25
Attending: NURSE PRACTITIONER
Payer: COMMERCIAL

## 2018-09-25 VITALS
BODY MASS INDEX: 35.68 KG/M2 | WEIGHT: 209 LBS | DIASTOLIC BLOOD PRESSURE: 72 MMHG | HEIGHT: 64 IN | SYSTOLIC BLOOD PRESSURE: 120 MMHG

## 2018-09-25 DIAGNOSIS — M79.89 CALF SWELLING: ICD-10-CM

## 2018-09-25 DIAGNOSIS — M17.0 BILATERAL PRIMARY OSTEOARTHRITIS OF KNEE: Primary | ICD-10-CM

## 2018-09-25 PROCEDURE — 20611 DRAIN/INJ JOINT/BURSA W/US: CPT | Mod: 50 | Performed by: FAMILY MEDICINE

## 2018-09-25 PROCEDURE — 99243 OFF/OP CNSLTJ NEW/EST LOW 30: CPT | Mod: 25 | Performed by: FAMILY MEDICINE

## 2018-09-25 RX ORDER — LIDOCAINE HYDROCHLORIDE 10 MG/ML
4 INJECTION, SOLUTION INFILTRATION; PERINEURAL
Status: DISCONTINUED | OUTPATIENT
Start: 2018-09-25 | End: 2024-07-31

## 2018-09-25 RX ORDER — BETAMETHASONE SODIUM PHOSPHATE AND BETAMETHASONE ACETATE 3; 3 MG/ML; MG/ML
6 INJECTION, SUSPENSION INTRA-ARTICULAR; INTRALESIONAL; INTRAMUSCULAR; SOFT TISSUE
Status: DISCONTINUED | OUTPATIENT
Start: 2018-09-25 | End: 2024-07-31

## 2018-09-25 RX ADMIN — LIDOCAINE HYDROCHLORIDE 4 ML: 10 INJECTION, SOLUTION INFILTRATION; PERINEURAL at 16:30

## 2018-09-25 RX ADMIN — BETAMETHASONE SODIUM PHOSPHATE AND BETAMETHASONE ACETATE 6 MG: 3; 3 INJECTION, SUSPENSION INTRA-ARTICULAR; INTRALESIONAL; INTRAMUSCULAR; SOFT TISSUE at 16:30

## 2018-09-25 NOTE — MR AVS SNAPSHOT
After Visit Summary   9/25/2018    Siri Vyas    MRN: [id number]           Patient Information     Date Of Birth          1970        Visit Information        Provider Department      9/25/2018 4:00 PM Jeffery Mcgee MD [facility]        Care Instructions      Understanding Osteoarthritis of the Knee    A joint is a place where two bones meet. The knee is called a hinge joint. This joint is formed where the thighbone (femur) meets the shinbone (tibia). A healthy knee joint bends freely. Knee osteoarthritis is a condition where parts of the knee joint wear out. This can lead to pain, stiffness, and limited movement.   What is osteoarthritis?  Every joint contains a smooth tissue called cartilage. Cartilage cushions the ends of bones and helps bones in a joint glide smoothly against each other. Knee osteoarthritis occurs when cartilage in the knee joint begins to break down and wear away. Bones may become exposed and rub together. The cartilage may become irritated and rough. This prevents smooth movement of the joint and can lead to pain.  Causes of osteoarthritis of the knee  Causes can include:    Wear and tear from normal use over time    Overuse of the knee during sports or work activities    Being overweight. This increases stress on the knee joint.    Misalignment of the knee joint    Injury to the knee  Symptoms of osteoarthritis of the knee  Common symptoms include:    Pain and swelling around the joint. The pain and swelling get worse with activity and better with rest.    Grinding sound when moving the knee    Reduced knee movement    Knee stiffness. This is often worse first thing in the morning.  Treating osteoarthritis of the knee  Osteoarthritis is a long-term condition. Treatment usually focuses on managing symptoms. Treatment may include:    Over-the-counter or prescription medicines taken by mouth to help relieve pain and  swelling    Injections of medicine into the joint to help relieve symptoms for a time    A weight-loss plan for people who are overweight    A plan of physical therapy and exercises to improve the strength and flexibility of the muscles around the knee    Heat or cold therapy to help relieve pain and stiffness    Assistive devices that help with movement, such as a cane or a walker    Assistive devices that make activities of daily life easier, such as raised toilet seats or shower bars  If other treatments don t do enough to relieve symptoms, you may need surgery to replace the joint. During this surgery, the damaged joint is removed. An artificial knee joint is then put into place. This can help relieve pain and stiffness and restore movement of the knee.     When to call your healthcare provider  Call your healthcare provider right away if you have any of these:    Fever of 100.4 F (38 C) or higher, or as directed    Symptoms that don t get better with prescribed medicines or get worse    New symptoms   Date Last Reviewed: 3/10/2016    7358-0109 [employer]. [address] [place] [address]. All rights reserved. This information is not intended as a substitute for professional medical care. Always follow your healthcare professional's instructions.                Follow-ups after your visit        Follow-up notes from your care team     Return if symptoms worsen or fail to improve.      Who to contact     If you have questions or need follow up information about today's clinic visit or your schedule please contact [facility] directly at [phone].  Normal or non-critical lab and imaging results will be communicated to you by MyChart, letter or phone within 4 business days after the clinic has received the results. If you do not hear from us within 7 days, please contact the clinic through MyChart or phone. If you have a critical or abnormal lab result, we  "will notify you by phone as soon as possible.  Submit refill requests through [employer] or call your pharmacy and they will forward the refill request to us. Please allow 3 business days for your refill to be completed.          Additional Information About Your Visit        [employer] Information     [employer] gives you secure access to your electronic health record. If you see a primary care provider, you can also send messages to your care team and make appointments. If you have questions, please call your primary care clinic.  If you do not have a primary care provider, please call [phone] and they will assist you.        Care EveryWhere ID     This is your Care EveryWhere ID. This could be used by other organizations to access your [facility] medical records  [id number]        Your Vitals Were     Height BMI (Body Mass Index)                5' 3.52\" (1.613 m) 36.42 kg/m2           Blood Pressure from Last 3 Encounters:   09/25/18 120/72   09/07/18 115/78   07/20/18 112/73    Weight from Last 3 Encounters:   09/25/18 209 lb (94.8 kg)   09/07/18 209 lb (94.8 kg)   07/20/18 204 lb (92.5 kg)              We Performed the Following     Large Joint Injection/Arthocentesis        Primary Care Provider Office Phone # Fax #    Chapito Coleman [phone]       [address]  [facility] [address]        Equal Access to Services     [facility]: Hadii aad ku hadasho Soomaali, waaxda luqadaha, qaybta kaalmada adeegyada, facundo covarrubias hayronny gee . So [facility] [phone].    ATENCIÓN: Si habla español, tiene a brandt disposición servicios gratuitos de asistencia lingüística. Concepción al [phone].    We comply with applicable federal civil rights laws and [place] laws. We do not discriminate on the basis of race, color, national origin, age, disability, sex, sexual orientation, or gender identity.            Thank you!     Thank you for choosing [facility]  for your " care. Our goal is always to provide you with excellent care. Hearing back from our patients is one way we can continue to improve our services. Please take a few minutes to complete the written survey that you may receive in the mail after your visit with us. Thank you!             Your Updated Medication List - Protect others around you: Learn how to safely use, store and throw away your medicines at www.disposemymeds.org.          This list is accurate as of 9/25/18  5:03 PM.  Always use your most recent med list.                   Brand Name Dispense Instructions for use Diagnosis    aspirin 325 MG EC tablet     90 tablet    Take 1 tablet (325 mg) by mouth daily    Thrombophlebitis leg (H)       fexofenadine 180 MG tablet    ALLEGRA    90 tablet    Take  by mouth. 1 TABLET DAILY FOR ALLERGIES    Seasonal allergic rhinitis       GLUCOSAMINE-CHONDROITIN PO           lidocaine 5 % ointment    XYLOCAINE    50 g    Apply 2 g topically 4 times daily as needed for moderate pain    Chronic pain of both knees, Acute left-sided low back pain without sciatica       methocarbamol 750 MG tablet    ROBAXIN    20 tablet    Take 1 tablet (750 mg) by mouth 3 times daily as needed for muscle spasms    Acute left-sided low back pain without sciatica       MULTIVITAMINS PO           OMEGA-3 FISH OIL PO           TYLENOL 325 MG tablet   Generic drug:  acetaminophen      Take 325-650 mg by mouth every 6 hours as needed

## 2018-09-25 NOTE — PROGRESS NOTES
ASSESSMENT & PLAN  Siri was seen today for pain and pain.    Diagnoses and all orders for this visit:    Bilateral primary osteoarthritis of knee  -     PHYSICAL THERAPY REFERRAL (Internal); Future    Calf swelling    Other orders  -     Large Joint Injection/Arthocentesis      # Bilateral Knee OA: Notable on x-ray on 7/20/2018 with gradual worsening over the past couple months in the setting of increase of activity.  She does have a moderate effusion in the left side and minimal effusion on the right.  Etiology likely related to increase activity though intra-articular pathology such as meniscal injury cannot be ruled out at this time.  Given the significant amount of pain and limitations she has today  bilateral steroid injections were completed as below with aspiration on the left side of 10 cc of clear yellow fluid.  She did report significant relief of her knee pain afterwards but calf pain/swelling pain still persisted.  Given this plan as listed below.  If examination negative as below will refer patient to physical therapy for quad and hamstring strengthening to help with her osteoarthritis.  We will plan to follow-up with patient as needed in the future.    # Bilateral calf pain/leg swelling: Uncertain etiology at this time possibly related to her bilateral knee OA and knee effusions but given history of pulmonary embolism and most recently a superficial thrombophlebitis concern at this point is an undiagnosed DVT.  Patient was seen after ultrasound work hours so she was referred to [facility] for further evaluation of this.  Signout was given to on-call ER provider at [facility] prior to arrival.  -----    SUBJECTIVE  Siri Vyas is a/an 47 year old female who is seen in consultation at the request of  Alba Lim C.N.P. for evaluation of bilateral knee pain. The patient is seen by themselves.  Patient is reporting bilateral knee pain and posterior knee swelling for  the past several months in the setting of patient trying to increase her level of activity.  She has been going to the gym with gradual worsening of her knee pain bilaterally left greater than right.  She has tried the following treatments below.  Of note patient does have a history of a unprovoked pulmonary embolism in 2007 and most recently been diagnosed with a superficial thrombophlebitis on the left side in July 2018 has not been taking aspirin and has had a coagulation workup x2 now both unrevealing.  No history of long plane rides or car rides recently.  No chest pain or shortness of breath reported today.  Patient main concern about the pain in the posterior portions of her knee bilaterally in addition to some swelling that has gradually worsened over the past couple months.    Onset: 3 month(s) ago. Reports insidious onset without acute precipitating event.  Location of Pain: bilateral posterior knee   Rating of Pain at worst: 10/10  Rating of Pain Currently: 6/10  Worsened by: ambulation, stairs, working out  Better with: rest, ice  Treatments tried: rest/activity avoidance, elevation, ice and heat  Associated symptoms: swelling, tightness, pain  Orthopedic history: NO  Relevant surgical history: NO  Superficial blood clot left knee -taking aspirin   Past Medical History:   Diagnosis Date     Anxiety disorder      AR (allergic rhinitis)      Cancer (H) 2000, 2018    breast cancer, lung cancer mother     Cerebral infarction (H)     grandmother     Depression with anxiety 10/2006     High cholesterol 10/2005     Hypertension     Mother  and brother     PE (pulmonary embolism) 01/2007     Social History     Social History     Marital status:      Spouse name: Jose Armando     Number of children: 4     Years of education: 16     Occupational History     [employer]     Social History Main Topics     Smoking status: Never Smoker     Smokeless tobacco: Never Used      Comment: no  "second hand smoke     Alcohol use Yes      Comment: 1-2 days in a week. Each time 1-2 beers. 0-5 beers weeks     Drug use: No     Sexual activity: Yes     Partners: Male      Comment: [employer]     Other Topics Concern     Parent/Sibling W/ Cabg, Mi Or Angioplasty Before 65f 55m? No     Social History Narrative         Patient's past medical, surgical, social, and family histories were reviewed today and no changes are noted.    REVIEW OF SYSTEMS:  10 point ROS is negative other than symptoms noted above in HPI, Past Medical History or as stated below  Constitutional: NEGATIVE for fever, chills, change in weight  Skin: NEGATIVE for worrisome rashes, moles or lesions  GI/: NEGATIVE for bowel or bladder changes  Neuro: NEGATIVE for weakness, dizziness or paresthesias    OBJECTIVE:  /72  Ht 5' 3.52\" (1.613 m)  Wt 209 lb (94.8 kg)  BMI 36.42 kg/m2   General: healthy, alert and in no distress  HEENT: no scleral icterus or conjunctival erythema  Skin: no suspicious lesions or rash. No jaundice.  CV: no pedal edema  Resp: normal respiratory effort without conversational dyspnea   Psych: normal mood and affect  Gait: normal steady gait with appropriate coordination and balance  Neuro: Normal light sensory exam of lower extremity  MSK:  BILATERAL KNEE  Inspection:    normal alignment, Mild effusion on right, moderate on left  Palpation:    Tender about the lateral joint line, medial joint line and popliteal region. Remainder of bony and ligamentous landmarks are nontender.    Moderate effusion is present on left, min on right    Patellofemoral crepitus is Absent  Range of Motion:     00 extension to 1200 flexion limited 2/2 knee tightness  Strength:    Quadriceps 5/5, hamstrings 5/5, gastrocsoleus 5/5 and tibialis anterior 5/5    Extensor mechanism intact  Special Tests:    Positive: Patellar grind, ballottement, Sherri's    Negative: MCL/valgus stress (0 & 30 deg), LCL/varus stress (0 & 30 deg), Lachman's, " anterior drawer, posterior drawer    Independent visualization of the below image:  No results found for this or any previous visit (from the past 24 hour(s)).    Bilateral Knee XR 7/16/18:  XR BILATERAL KNEE ONE-TWO VIEWS  7/16/2018 4:53 PM       HISTORY: Worsening posterior knee pain x1-2 months, left greater than.  Acute pain of both knees.       COMPARISON: None.       IMPRESSION: No acute fracture or dislocation. Mild osteoarthritis  bilaterally.     ALYSSIA CAMACHO MD    Large Joint Injection/Arthocentesis  Date/Time: 9/25/2018 4:30 PM  Performed by: JEFFERY SEARS  Authorized by: JEFFERY SEARS     Indications:  Pain and osteoarthritis  Needle Size comment:  18 left, 25 right   Guidance: ultrasound    Approach comment:  Superolateral right, Superomedial left  Location:  Knee  Laterality:  Bilateral  Site:  Bilateral knee  Medications (Right):  4 mL lidocaine 1 %; 6 mg betamethasone acet & sod phos 6 (3-3) MG/ML  Medications (Left):  4 mL lidocaine 1 %; 6 mg betamethasone acet & sod phos 6 (3-3) MG/ML  Aspirate amount (mL):  10  Aspirate:  Clear and serous  Outcome:  Tolerated well, no immediate complications  Procedure discussed: discussed risks, benefits, and alternatives    Consent Given by:  Patient  Prep: patient was prepped and draped in usual sterile fashion              Patient's conditions were thoroughly discussed during today's visit with greater than 50% of the visit spent counseling the patient with total time spent face-to-face with the patient being 35 minutes.    Jeffery Sears MD, [facility]

## 2018-09-25 NOTE — PATIENT INSTRUCTIONS
Understanding Osteoarthritis of the Knee    A joint is a place where two bones meet. The knee is called a hinge joint. This joint is formed where the thighbone (femur) meets the shinbone (tibia). A healthy knee joint bends freely. Knee osteoarthritis is a condition where parts of the knee joint wear out. This can lead to pain, stiffness, and limited movement.   What is osteoarthritis?  Every joint contains a smooth tissue called cartilage. Cartilage cushions the ends of bones and helps bones in a joint glide smoothly against each other. Knee osteoarthritis occurs when cartilage in the knee joint begins to break down and wear away. Bones may become exposed and rub together. The cartilage may become irritated and rough. This prevents smooth movement of the joint and can lead to pain.  Causes of osteoarthritis of the knee  Causes can include:    Wear and tear from normal use over time    Overuse of the knee during sports or work activities    Being overweight. This increases stress on the knee joint.    Misalignment of the knee joint    Injury to the knee  Symptoms of osteoarthritis of the knee  Common symptoms include:    Pain and swelling around the joint. The pain and swelling get worse with activity and better with rest.    Grinding sound when moving the knee    Reduced knee movement    Knee stiffness. This is often worse first thing in the morning.  Treating osteoarthritis of the knee  Osteoarthritis is a long-term condition. Treatment usually focuses on managing symptoms. Treatment may include:    Over-the-counter or prescription medicines taken by mouth to help relieve pain and swelling    Injections of medicine into the joint to help relieve symptoms for a time    A weight-loss plan for people who are overweight    A plan of physical therapy and exercises to improve the strength and flexibility of the muscles around the knee    Heat or cold therapy to help relieve pain and stiffness    Assistive devices that  help with movement, such as a cane or a walker    Assistive devices that make activities of daily life easier, such as raised toilet seats or shower bars  If other treatments don t do enough to relieve symptoms, you may need surgery to replace the joint. During this surgery, the damaged joint is removed. An artificial knee joint is then put into place. This can help relieve pain and stiffness and restore movement of the knee.     When to call your healthcare provider  Call your healthcare provider right away if you have any of these:    Fever of 100.4 F (38 C) or higher, or as directed    Symptoms that don t get better with prescribed medicines or get worse    New symptoms   Date Last Reviewed: 3/10/2016    1061-5069 [employer] [address] [place] [address], [place] [address]. All rights reserved. This information is not intended as a substitute for professional medical care. Always follow your healthcare professional's instructions.

## 2018-09-25 NOTE — LETTER
9/25/2018         RE: Siri Vyas  [address] [place] [address]        Dear Colleague,    Thank you for referring your patient, Siri Vyas, to the [facility]. Please see a copy of my visit note below.    ASSESSMENT & PLAN  Siri was seen today for pain and pain.    Diagnoses and all orders for this visit:    Bilateral primary osteoarthritis of knee  -     PHYSICAL THERAPY REFERRAL (Internal); Future    Calf swelling    Other orders  -     Large Joint Injection/Arthocentesis      # Bilateral Knee OA: Notable on x-ray on 7/20/2018 with gradual worsening over the past couple months in the setting of increase of activity.  She does have a moderate effusion in the left side and minimal effusion on the right.  Etiology likely related to increase activity though intra-articular pathology such as meniscal injury cannot be ruled out at this time.  Given the significant amount of pain and limitations she has today  bilateral steroid injections were completed as below with aspiration on the left side of 10 cc of clear yellow fluid.  She did report significant relief of her knee pain afterwards but calf pain/swelling pain still persisted.  Given this plan as listed below.  If examination negative as below will refer patient to physical therapy for quad and hamstring strengthening to help with her osteoarthritis.  We will plan to follow-up with patient as needed in the future.    # Bilateral calf pain/leg swelling: Uncertain etiology at this time possibly related to her bilateral knee OA and knee effusions but given history of pulmonary embolism and most recently a superficial thrombophlebitis concern at this point is an undiagnosed DVT.  Patient was seen after ultrasound work hours so she was referred to [facility] for further evaluation of this.  Signout was given to on-call ER provider at [facility] prior to arrival.  -----    SUBJECTIVE  Siri  LAURENCE Vyas is a/an 47 year old female who is seen in consultation at the request of  Alba Lim C.N.P. for evaluation of bilateral knee pain. The patient is seen by themselves.  Patient is reporting bilateral knee pain and posterior knee swelling for the past several months in the setting of patient trying to increase her level of activity.  She has been going to the gym with gradual worsening of her knee pain bilaterally left greater than right.  She has tried the following treatments below.  Of note patient does have a history of a unprovoked pulmonary embolism in 2007 and most recently been diagnosed with a superficial thrombophlebitis on the left side in July 2018 has not been taking aspirin and has had a coagulation workup x2 now both unrevealing.  No history of long plane rides or car rides recently.  No chest pain or shortness of breath reported today.  Patient main concern about the pain in the posterior portions of her knee bilaterally in addition to some swelling that has gradually worsened over the past couple months.    Onset: 3 month(s) ago. Reports insidious onset without acute precipitating event.  Location of Pain: bilateral posterior knee   Rating of Pain at worst: 10/10  Rating of Pain Currently: 6/10  Worsened by: ambulation, stairs, working out  Better with: rest, ice  Treatments tried: rest/activity avoidance, elevation, ice and heat  Associated symptoms: swelling, tightness, pain  Orthopedic history: NO  Relevant surgical history: NO  Superficial blood clot left knee -taking aspirin   Past Medical History:   Diagnosis Date     Anxiety disorder      AR (allergic rhinitis)      Cancer (H) 2000, 2018    breast cancer, lung cancer mother     Cerebral infarction (H)     grandmother     Depression with anxiety 10/2006     High cholesterol 10/2005     Hypertension     Mother  and brother     PE (pulmonary embolism) 01/2007     Social History     Social History     Marital status:   "    Spouse name: Jose Armando     Number of children: 4     Years of education: 16     Occupational History     [employer]     Social History Main Topics     Smoking status: Never Smoker     Smokeless tobacco: Never Used      Comment: no second hand smoke     Alcohol use Yes      Comment: 1-2 days in a week. Each time 1-2 beers. 0-5 beers weeks     Drug use: No     Sexual activity: Yes     Partners: Male      Comment: [employer]     Other Topics Concern     Parent/Sibling W/ Cabg, Mi Or Angioplasty Before 65f 55m? No     Social History Narrative         Patient's past medical, surgical, social, and family histories were reviewed today and no changes are noted.    REVIEW OF SYSTEMS:  10 point ROS is negative other than symptoms noted above in HPI, Past Medical History or as stated below  Constitutional: NEGATIVE for fever, chills, change in weight  Skin: NEGATIVE for worrisome rashes, moles or lesions  GI/: NEGATIVE for bowel or bladder changes  Neuro: NEGATIVE for weakness, dizziness or paresthesias    OBJECTIVE:  /72  Ht 5' 3.52\" (1.613 m)  Wt 209 lb (94.8 kg)  BMI 36.42 kg/m2   General: healthy, alert and in no distress  HEENT: no scleral icterus or conjunctival erythema  Skin: no suspicious lesions or rash. No jaundice.  CV: no pedal edema  Resp: normal respiratory effort without conversational dyspnea   Psych: normal mood and affect  Gait: normal steady gait with appropriate coordination and balance  Neuro: Normal light sensory exam of lower extremity  MSK:  BILATERAL KNEE  Inspection:    normal alignment, Mild effusion on right, moderate on left  Palpation:    Tender about the lateral joint line, medial joint line and popliteal region. Remainder of bony and ligamentous landmarks are nontender.    Moderate effusion is present on left, min on right    Patellofemoral crepitus is Absent  Range of Motion:     00 extension to 1200 flexion limited 2/2 knee tightness  Strength:    " Quadriceps 5/5, hamstrings 5/5, gastrocsoleus 5/5 and tibialis anterior 5/5    Extensor mechanism intact  Special Tests:    Positive: Patellar grind, ballottement, Sherri's    Negative: MCL/valgus stress (0 & 30 deg), LCL/varus stress (0 & 30 deg), Lachman's, anterior drawer, posterior drawer    Independent visualization of the below image:  No results found for this or any previous visit (from the past 24 hour(s)).    Bilateral Knee XR 7/16/18:  XR BILATERAL KNEE ONE-TWO VIEWS  7/16/2018 4:53 PM       HISTORY: Worsening posterior knee pain x1-2 months, left greater than.  Acute pain of both knees.       COMPARISON: None.       IMPRESSION: No acute fracture or dislocation. Mild osteoarthritis  bilaterally.     ALYSSIA CAMACHO MD    Large Joint Injection/Arthocentesis  Date/Time: 9/25/2018 4:30 PM  Performed by: JENNIFER SEARS  Authorized by: JENNIFER SEARS     Indications:  Pain and osteoarthritis  Needle Size comment:  18 left, 25 right   Guidance: ultrasound    Approach comment:  Superolateral right, Superomedial left  Location:  Knee  Laterality:  Bilateral  Site:  Bilateral knee  Medications (Right):  4 mL lidocaine 1 %; 6 mg betamethasone acet & sod phos 6 (3-3) MG/ML  Medications (Left):  4 mL lidocaine 1 %; 6 mg betamethasone acet & sod phos 6 (3-3) MG/ML  Aspirate amount (mL):  10  Aspirate:  Clear and serous  Outcome:  Tolerated well, no immediate complications  Procedure discussed: discussed risks, benefits, and alternatives    Consent Given by:  Patient  Prep: patient was prepped and draped in usual sterile fashion              Patient's conditions were thoroughly discussed during today's visit with greater than 50% of the visit spent counseling the patient with total time spent face-to-face with the patient being 35 minutes.    Jennifer Sears MD, [facility]      Again, thank you for allowing me to participate in the care of your patient.         Sincerely,        Jeffery Mcgee MD

## 2018-10-11 ENCOUNTER — THERAPY VISIT (OUTPATIENT)
Dept: PHYSICAL THERAPY | Facility: CLINIC | Age: 48
End: 2018-10-11
Payer: COMMERCIAL

## 2018-10-11 DIAGNOSIS — M17.0 BILATERAL PRIMARY OSTEOARTHRITIS OF KNEE: ICD-10-CM

## 2018-10-11 PROCEDURE — 97161 PT EVAL LOW COMPLEX 20 MIN: CPT | Mod: GP | Performed by: PHYSICAL THERAPIST

## 2018-10-11 PROCEDURE — 97110 THERAPEUTIC EXERCISES: CPT | Mod: GP | Performed by: PHYSICAL THERAPIST

## 2018-10-11 ASSESSMENT — ACTIVITIES OF DAILY LIVING (ADL)
GO DOWN STAIRS: ACTIVITY IS VERY DIFFICULT
RISE FROM A CHAIR: ACTIVITY IS FAIRLY DIFFICULT
KNEE_ACTIVITY_OF_DAILY_LIVING_SUM: 19
KNEE_ACTIVITY_OF_DAILY_LIVING_SCORE: 27.14
KNEEL ON THE FRONT OF YOUR KNEE: ACTIVITY IS VERY DIFFICULT
GO UP STAIRS: ACTIVITY IS FAIRLY DIFFICULT
SQUAT: I AM UNABLE TO DO THE ACTIVITY
WEAKNESS: THE SYMPTOM AFFECTS MY ACTIVITY SEVERELY
SWELLING: THE SYMPTOM AFFECTS MY ACTIVITY SEVERELY
HOW_WOULD_YOU_RATE_THE_OVERALL_FUNCTION_OF_YOUR_KNEE_DURING_YOUR_USUAL_DAILY_ACTIVITIES?: ABNORMAL
SIT WITH YOUR KNEE BENT: ACTIVITY IS SOMEWHAT DIFFICULT
WALK: ACTIVITY IS FAIRLY DIFFICULT
HOW_WOULD_YOU_RATE_THE_CURRENT_FUNCTION_OF_YOUR_KNEE_DURING_YOUR_USUAL_DAILY_ACTIVITIES_ON_A_SCALE_FROM_0_TO_100_WITH_100_BEING_YOUR_LEVEL_OF_KNEE_FUNCTION_PRIOR_TO_YOUR_INJURY_AND_0_BEING_THE_INABILITY_TO_PERFORM_ANY_OF_YOUR_USUAL_DAILY_ACTIVITIES?: 30
STAND: ACTIVITY IS FAIRLY DIFFICULT
AS_A_RESULT_OF_YOUR_KNEE_INJURY,_HOW_WOULD_YOU_RATE_YOUR_CURRENT_LEVEL_OF_DAILY_ACTIVITY?: SEVERELY ABNORMAL
STIFFNESS: THE SYMPTOM AFFECTS MY ACTIVITY SEVERELY
PAIN: THE SYMPTOM AFFECTS MY ACTIVITY SEVERELY
GIVING WAY, BUCKLING OR SHIFTING OF KNEE: THE SYMPTOM AFFECTS MY ACTIVITY SEVERELY
LIMPING: THE SYMPTOM AFFECTS MY ACTIVITY SEVERELY
RAW_SCORE: 19

## 2018-10-11 NOTE — MR AVS SNAPSHOT
After Visit Summary   10/11/2018    Siri Vyas    MRN: [id number]           Patient Information     Date Of Birth          1970        Visit Information        Provider Department      10/11/2018 2:40 PM [facility], PT [facility]        Today's Diagnoses     Bilateral primary osteoarthritis of knee           Follow-ups after your visit        Your next 10 appointments already scheduled     Oct 19, 2018  3:20 PM CDT   [facility] Extremity with Leonie Turner PTA   [facility] ([place]  )    [address] [facility] [address]   [phone]            Oct 26, 2018  3:20 PM CDT   TOMMI[facility] Extremity with Leonie Turner PTA   [facility] ([facility] [place]  )    [address] [address]   [phone]            Nov 02, 2018  3:20 PM CDT   TOMMIE Extremity with Leonie Turner PTA   [facility] ([place] [place]  )    [address] [facility] [address]   [phone]            Nov 09, 2018  3:30 PM CST   TOMMI[facility] Extremity with [facility]ting PT   [facility] ([facility] [place]  )    [address] [facility] [address]   [phone]            Nov 16, 2018  3:30 PM CST   TOMMIE Extremity with Clarke Painting PT   [facility] ([place]  )    [address] [address]   [phone]              Who to contact     If you have questions or need follow up information about today's clinic visit or your schedule please contact [facility] directly at [phone].  Normal or non-critical lab and imaging results will be communicated to you by MyChart, letter or phone within 4 business days after the clinic has received the results. If you do not hear from us within 7 days, please  contact the clinic through [email or web address] or phone. If you have a critical or abnormal lab result, we will notify you by phone as soon as possible.  Submit refill requests through [email or web address] or call your pharmacy and they will forward the refill request to us. Please allow 3 business days for your refill to be completed.          Additional Information About Your Visit        [employer] Information     [email or web address] gives you secure access to your electronic health record. If you see a primary care provider, you can also send messages to your care team and make appointments. If you have questions, please call your primary care clinic.  If you do not have a primary care provider, please call [phone] and they will assist you.        Care EveryWhere ID     This is your Care EveryWhere ID. This could be used by other organizations to access your [facility] medical records  [id number]         Blood Pressure from Last 3 Encounters:   09/25/18 120/72   09/07/18 115/78   07/20/18 112/73    Weight from Last 3 Encounters:   09/25/18 94.8 kg (209 lb)   09/07/18 94.8 kg (209 lb)   07/20/18 92.5 kg (204 lb)              We Performed the Following     TOMMIE Inital Eval Report     PHYSICAL THERAPY REFERRAL (Internal)     PT Eval, Low Complexity (99378)     Therapeutic Exercises        Primary Care Provider Office Phone # Fax #    Chapito Coleman [phone]       [address]  [facility] [address]        Equal Access to Services     [facility]: Hadii aad ku hadasho Soomaali, waaxda luqadaha, qaybta kaalmada adeegyada, facundo gee . So [facility] [phone].    ATENCIÓN: Si habla español, tiene a brandt disposición servicios gratuitos de asistencia lingüística. Llame al [phone].    We comply with applicable federal civil rights laws and [place] laws. We do not discriminate on the basis of race, color, national origin, age, disability, sex, sexual orientation, or gender identity.            Thank you!      Thank you for choosing [facility]  for your care. Our goal is always to provide you with excellent care. Hearing back from our patients is one way we can continue to improve our services. Please take a few minutes to complete the written survey that you may receive in the mail after your visit with us. Thank you!             Your Updated Medication List - Protect others around you: Learn how to safely use, store and throw away your medicines at www.disposemymeds.org.          This list is accurate as of 10/11/18 10:12 PM.  Always use your most recent med list.                   Brand Name Dispense Instructions for use Diagnosis    aspirin 325 MG EC tablet     90 tablet    Take 1 tablet (325 mg) by mouth daily    Thrombophlebitis leg       fexofenadine 180 MG tablet    ALLEGRA    90 tablet    Take  by mouth. 1 TABLET DAILY FOR ALLERGIES    Seasonal allergic rhinitis       GLUCOSAMINE-CHONDROITIN PO           lidocaine 5 % ointment    XYLOCAINE    50 g    Apply 2 g topically 4 times daily as needed for moderate pain    Chronic pain of both knees, Acute left-sided low back pain without sciatica       methocarbamol 750 MG tablet    ROBAXIN    20 tablet    Take 1 tablet (750 mg) by mouth 3 times daily as needed for muscle spasms    Acute left-sided low back pain without sciatica       MULTIVITAMINS PO           OMEGA-3 FISH OIL PO           TYLENOL 325 MG tablet   Generic drug:  acetaminophen      Take 325-650 mg by mouth every 6 hours as needed

## 2018-10-11 NOTE — PROGRESS NOTES
[facility] Initial Evaluation  Subjective:  Patient is a 47 year old female presenting with rehab right knee hpi.   Siri Vyas is a 47 year old female with a bilateral knees condition.  Condition occurred with:  Repetition/overuse.  Condition occurred: during recreation/sport.    June 2018    HISTORY OF SUPERFICIAL BLOOD CLOT IN LEFT CALF.    HISTORY OF PULMONARY EMBOLISM. .    Patient reports pain:  Posterior.  Radiates to:  Lower leg and thigh.  Pain is described as burning, cramping, aching, sharp, shooting and stabbing and is constant and reported as 10/10.  Associated symptoms:  Edema, loss of motion/stiffness and loss of strength. Pain is worse in the A.M..  Symptoms are exacerbated by walking, descending stairs, transfers, standing, bending/squatting and kneeling Relieved by: PAIN MED.  Since onset symptoms are gradually improving.  Special tests:  X-ray (US).  Previous treatment includes chiropractic.  There was no improvement following previous treatment.  General health as reported by patient is good.  Pertinent medical history includes:  Migraines/headaches and overweight.  Medical allergies: no.  Other surgeries include:  Orthopedic surgery and other.  Current medications:  Pain medication.  Current occupation is PROCURMENT COORDINATOR. .  Patient is working in normal job without restrictions.  Primary job tasks include:  Driving, lifting and repetitive tasks.    Barriers include:  None as reported by the patient.    Red flags: NONE.                        Objective:  System                                                Knee Evaluation:  ROM:      PROM    Hyperextension: Left:   Right:  2  Extension: Left: 3   Right:   Flexion: Left: 115    Right:  134      Strength:     Extension:  Left: 4/5    Pain:++      Right: 4/5    Pain:++  Flexion:  Left: 4/5    Pain:++      Right: 4+/5   Pain:        Ligament Testing:  Normal                Special Tests:   Left knee positive for the  following special tests:  Meniscal    Right knee negative for the following special tests:  Meniscal  Palpation:    Left knee tenderness present at:  Medial Joint Line; Lateral Joint Line and Popliteal  Right knee tenderness present at:  Medial Joint Line  Edema:    Circumference:      Joint Line:  Left:  2/5   Right:  3/5      Functional Testing:          Quad:    Single Leg Squat:  Left:      Significant loss of control  Right:       Moderate loss of control  Bilateral Leg Squat:                General     ROS    Assessment/Plan:    Patient is a 47 year old female with both sides knee complaints.    Patient has the following significant findings with corresponding treatment plan.                Diagnosis 1:  PRIMARY OA OF BOTH KNEES  Pain -  hot/cold therapy, US, electric stimulation, manual therapy, splint/taping/bracing/orthotics, self management, education, directional preference exercise and home program  Decreased ROM/flexibility - manual therapy, therapeutic exercise, therapeutic activity and home program  Decreased strength - therapeutic exercise, therapeutic activities and home program  Edema - cold therapy and self management/home program  Decreased function - therapeutic activities and home program    Therapy Evaluation Codes:   1) History comprised of:   Personal factors that impact the plan of care:      Past/current experiences.    Comorbidity factors that impact the plan of care are:      Osteoarthritis and Overweight.     Medications impacting care: None.  2) Examination of Body Systems comprised of:   Body structures and functions that impact the plan of care:      Hip and Knee.   Activity limitations that impact the plan of care are:      Bending, Cooking, Driving, Dressing, Lifting, Squatting/kneeling, Stairs, Walking and Working.  3) Clinical presentation characteristics are:   Stable/Uncomplicated.  4) Decision-Making    Low complexity using standardized patient assessment instrument and/or  measureable assessment of functional outcome.  Cumulative Therapy Evaluation is: Low complexity.    Previous and current functional limitations:  (See Goal Flow Sheet for this information)    Short term and Long term goals: (See Goal Flow Sheet for this information)     Communication ability:  Patient appears to be able to clearly communicate and understand verbal and written communication and follow directions correctly.  Treatment Explanation - The following has been discussed with the patient:   RX ordered/plan of care  Anticipated outcomes  Possible risks and side effects  This patient would benefit from PT intervention to resume normal activities.   Rehab potential is fair.    Frequency:  2 X week, once daily  Duration:  for 6 weeks  Discharge Plan:  Achieve all LTG.  Independent in home treatment program.  Reach maximal therapeutic benefit.    Please refer to the daily flowsheet for treatment today, total treatment time and time spent performing 1:1 timed codes.

## 2018-10-11 NOTE — LETTER
[facility]  [address]  [facility] [address]  [phone]    2018    Re: Siri Vyas   :   1970  MRN:  [id number]   REFERRING PHYSICIAN:   Jeffery Mcgee  [facility]  Date of Initial Evaluation:  10/11/2018  Visits:  Rxs Used: 1  Reason for Referral:  Bilateral primary osteoarthritis of knee    EVALUATION SUMMARY  [facility] Initial Evaluation  Subjective:  Patient is a 47 year old female presenting with rehab right knee hpi.   Siri Vyas is a 47 year old female with a bilateral knees condition.  Condition occurred with:  Repetition/overuse.  Condition occurred: during recreation/sport.    2018    HISTORY OF SUPERFICIAL BLOOD CLOT IN LEFT CALF.    HISTORY OF PULMONARY EMBOLISM. .    Patient reports pain:  Posterior.  Radiates to:  Lower leg and thigh.  Pain is described as burning, cramping, aching, sharp, shooting and stabbing and is constant and reported as 10/10.  Associated symptoms:  Edema, loss of motion/stiffness and loss of strength. Pain is worse in the A.M..  Symptoms are exacerbated by walking, descending stairs, transfers, standing, bending/squatting and kneeling Relieved by: PAIN MED.  Since onset symptoms are gradually improving.  Special tests:  X-ray (US).  Previous treatment includes chiropractic.  There was no improvement following previous treatment.  General health as reported by patient is good.  Pertinent medical history includes:  Migraines/headaches and overweight.  Medical allergies: no.  Other surgeries include:  Orthopedic surgery and other.  Current medications:  Pain medication.  Current occupation is PROCURMENT COORDINATOR. .  Patient is working in normal job without restrictions.  Primary job tasks include:  Driving, lifting and repetitive tasks.  Barriers include:  None as reported by the patient.  Red flags: NONE.  Objective:  System    Knee  Evaluation:  ROM:    PROM  Hyperextension: Left:   Right:  2  Extension: Left: 3   Right:   Flexion: Left: 115    Right:  134  Strength:   Extension:  Left: 4/5    Pain:++      Right: 4/5    Pain:++  Flexion:  Left: 4/5    Pain:++      Right: 4+/5   Pain:    Ligament Testing:  Normal  Special Tests:   Left knee positive for the following special tests:  Meniscal  Right knee negative for the following special tests:  Meniscal  Palpation:    Left knee tenderness present at:  Medial Joint Line; Lateral Joint Line and Popliteal  Right knee tenderness present at:  Medial Joint Line  Edema:    Circumference:  Joint Line:  Left:  2/5   Right:  3/5    Functional Testing:      Quad:    Single Leg Squat:  Left:      Significant loss of control  Right:       Moderate loss of control  Bilateral Leg Squat:      Assessment/Plan:    Patient is a 47 year old female with both sides knee complaints.    Patient has the following significant findings with corresponding treatment plan.                Diagnosis 1:  PRIMARY OA OF BOTH KNEES  Pain -  hot/cold therapy, US, electric stimulation, manual therapy, splint/taping/bracing/orthotics, self management, education, directional preference exercise and home program  Decreased ROM/flexibility - manual therapy, therapeutic exercise, therapeutic activity and home program  Decreased strength - therapeutic exercise, therapeutic activities and home program  Edema - cold therapy and self management/home program  Decreased function - therapeutic activities and home program    Therapy Evaluation Codes:   1) History comprised of:   Personal factors that impact the plan of care:      Past/current experiences.    Comorbidity factors that impact the plan of care are:      Osteoarthritis and Overweight.     Medications impacting care: None.  2) Examination of Body Systems comprised of:   Body structures and functions that impact the plan of care:      Hip and Knee.   Activity limitations that impact  the plan of care are:      Bending, Cooking, Driving, Dressing, Lifting, Squatting/kneeling, Stairs, Walking and Working.  3) Clinical presentation characteristics are:   Stable/Uncomplicated.  4) Decision-Making    Low complexity using standardized patient assessment instrument and/or measureable assessment of functional outcome.  Cumulative Therapy Evaluation is: Low complexity.    Previous and current functional limitations:  (See Goal Flow Sheet for this information)    Short term and Long term goals: (See Goal Flow Sheet for this information)     Communication ability:  Patient appears to be able to clearly communicate and understand verbal and written communication and follow directions correctly.  Treatment Explanation - The following has been discussed with the patient:   RX ordered/plan of care  Anticipated outcomes  Possible risks and side effects  This patient would benefit from PT intervention to resume normal activities.   Rehab potential is fair.  Frequency:  2 X week, once daily  Duration:  for 6 weeks  Discharge Plan:  Achieve all LTG.  Independent in home treatment program.  Reach maximal therapeutic benefit.    Please refer to the daily flowsheet for treatment today, total treatment time and time spent performing 1:1 timed codes.   Thank you for your referral.    INQUIRIES  Therapist: Alexander Painting, PT  [facility]  [address]  [facility] [address]  Phone: [phone]  Fax: [phone]

## 2018-11-20 NOTE — PATIENT INSTRUCTIONS
Can try muscle relaxer at night (or during the day if you are not too drowsy) as needed for back pain.  Recommend follow up with your chiropractor as well.    Lidocaine ointment as needed for knee or back pain.    Follow-up with ortho for possible knee injections.    At [facility], we strive to deliver an exceptional experience to you, every time we see you.  If you receive a survey in the mail, please send us back your thoughts. We really do value your feedback.    Based on your medical history, these are the current health maintenance/preventive care services that you are due for (some may have been done at this visit.)  Health Maintenance Due   Topic Date Due     HIV SCREEN (SYSTEM ASSIGNED)  10/30/1988     PAP SCREENING Q3 YR (SYSTEM ASSIGNED)  04/25/2018     INFLUENZA VACCINE (1) 09/01/2018       Suggested websites for health information:  Www.Ahaali.Door 6 : Up to date and easily searchable information on multiple topics.  Www.JJ PHARMA.gov : medication info, interactive tutorials, watch real surgeries online  Www.familydoctor.org : good info from the Academy of Family Physicians  Www.cdc.gov : public health info, travel advisories, epidemics (H1N1)  Www.aap.org : children's health info, normal development, vaccinations  Www.health.[facility].[place].us : [place] dept of health, public health issues in [place], N1N1    Your care team:                            Family Medicine Internal Medicine   MD Nathaniel Piña MD Shantel Branch-Fleming, MD Katya Georgiev PA-C Megan Hill, QUANG Coleman MD Pediatrics   SHRADDHA Turpin, MD Sandhya Ledezma APRN CNP   MD Siria Mittal MD Deborah Mielke, MD Kim Thein, APRN [facility]      Clinic hours: Monday - Thursday 7 am-7 pm; Fridays 7 am-5 pm.   Urgent care: Monday - Friday 11 am-9 pm; Saturday and Sunday 9 am-5 pm.  Pharmacy : Monday -Thursday 8 am-8 pm; Friday 8 am-6 pm; Saturday and  Sunday 9 am-5 pm.     Clinic: [phone]   Pharmacy: [phone]       20-Nov-2018 18:30

## 2018-11-28 ENCOUNTER — TRANSFERRED RECORDS (OUTPATIENT)
Dept: HEALTH INFORMATION MANAGEMENT | Facility: CLINIC | Age: 48
End: 2018-11-28

## 2018-11-30 PROBLEM — M17.0 BILATERAL PRIMARY OSTEOARTHRITIS OF KNEE: Status: RESOLVED | Noted: 2018-10-11 | Resolved: 2018-11-30

## 2019-02-18 ENCOUNTER — OFFICE VISIT (OUTPATIENT)
Dept: FAMILY MEDICINE | Facility: CLINIC | Age: 49
End: 2019-02-18
Payer: COMMERCIAL

## 2019-02-18 VITALS
SYSTOLIC BLOOD PRESSURE: 111 MMHG | DIASTOLIC BLOOD PRESSURE: 73 MMHG | WEIGHT: 215 LBS | OXYGEN SATURATION: 97 % | TEMPERATURE: 98.7 F | HEART RATE: 77 BPM | BODY MASS INDEX: 37.46 KG/M2

## 2019-02-18 DIAGNOSIS — M25.562 PAIN AND SWELLING OF LEFT KNEE: Primary | ICD-10-CM

## 2019-02-18 DIAGNOSIS — M25.462 PAIN AND SWELLING OF LEFT KNEE: Primary | ICD-10-CM

## 2019-02-18 DIAGNOSIS — E66.01 MORBID OBESITY (H): ICD-10-CM

## 2019-02-18 DIAGNOSIS — M25.461 PAIN AND SWELLING OF RIGHT KNEE: ICD-10-CM

## 2019-02-18 DIAGNOSIS — M25.561 PAIN AND SWELLING OF RIGHT KNEE: ICD-10-CM

## 2019-02-18 DIAGNOSIS — M17.0 PRIMARY OSTEOARTHRITIS OF BOTH KNEES: ICD-10-CM

## 2019-02-18 PROCEDURE — 99213 OFFICE O/P EST LOW 20 MIN: CPT | Performed by: NURSE PRACTITIONER

## 2019-02-18 ASSESSMENT — PATIENT HEALTH QUESTIONNAIRE - PHQ9: SUM OF ALL RESPONSES TO PHQ QUESTIONS 1-9: 2

## 2019-02-18 NOTE — PROGRESS NOTES
SUBJECTIVE:   Siri Vyas is a 48 year old female who presents to clinic today for the following health issues:      Musculoskeletal problem/pain (recheck)      Duration: 6 months    Description  Location: leg pain- in both calves, knees and thighs    Intensity:  severe    Accompanying signs and symptoms: numbness    History  Previous similar problem: no   Previous evaluation:  x-ray, ultrasound, MRI and orthopedic evaluation    Precipitating or alleviating factors:  Trauma or overuse: unsure  Aggravating factors include: standing, walking, climbing stairs and exercise    Therapies tried and outcome: acetaminophen, Ibuprofen and physical therapy    Left knee was slightly better after draining and steroid injection in 2018.  Right knee was also injected but no change in pain on that side.  For the last 2 months or so, the left knee has worsened a lot.  She describes pain and tightness in back of knee, locking up, giving out, pain with flexion and full extension.  She did go to physical therapy once but was unable to follow up as her mother was diagnosed with cancer again and her father has dementia.  She does not have time of physical therapy.      Problem list and histories reviewed & adjusted, as indicated.  Additional history: as documented    Patient Active Problem List   Diagnosis     Seasonal allergic rhinitis     CARDIOVASCULAR SCREENING; LDL GOAL LESS THAN 160     S/P cholecystectomy     Dermoid cyst of ovary     H/O:  section     LUQ abdominal pain     Paresthesias/numbness     Median neuropathy     Epicondylitis, medial humeral     Tendinitis of left wrist     Headache     Chondromalacia, knee, left     Chondromalacia patellae, right     CTS (carpal tunnel syndrome)     Family history of malignant neoplasm of breast     Class 2 obesity due to excess calories without serious comorbidity with body mass index (BMI) of 39.0 to 39.9 in adult     Saphenous vein clot, left      Thrombophlebitis leg     Pain and swelling of left knee     Acute left-sided low back pain without sciatica     Grief reaction     Primary osteoarthritis of both knees     Pain and swelling of right knee     Past Surgical History:   Procedure Laterality Date     ABDOMEN SURGERY  96,98,00,04, 99    4 c-sections, dermoid cyst     Dermoid cyst  (OVARY) removal  1997     GYN SURGERY  ,,,          HERNIA REPAIR       LAPAROSCOPIC CHOLECYSTECTOMY  2001     SINUS SURGERY  2002    Nasls sinuses     TONSILLECTOMY  1999       Social History     Tobacco Use     Smoking status: Never Smoker     Smokeless tobacco: Never Used     Tobacco comment: no second hand smoke   Substance Use Topics     Alcohol use: Yes     Comment: 1-2 days in a week. Each time 1-2 beers. 0-5 beers weeks     Family History   Problem Relation Age of Onset     Breast Cancer Mother      Lipids Mother      Hypertension Mother      Hearing Loss Mother      Lung Cancer Mother         2017     Hyperlipidemia Mother      Other Cancer Mother         lung     Esophageal Cancer Mother      Lipids Brother      Vascular Disease Father      Cerebrovascular Disease Maternal Grandmother 76     Neurologic Disorder Son 14        migraines     Breast Cancer Maternal Aunt 70     Breast Cancer Maternal Aunt 80     Hypertension Brother      Hyperlipidemia Brother      Colon Cancer Cousin      Other Cancer Cousin      Other Cancer Other         breast         Current Outpatient Medications   Medication Sig Dispense Refill     acetaminophen (TYLENOL) 325 MG tablet Take 325-650 mg by mouth every 6 hours as needed       aspirin 325 MG EC tablet Take 1 tablet (325 mg) by mouth daily 90 tablet 3     fexofenadine (ALLEGRA) 180 MG tablet Take  by mouth. 1 TABLET DAILY FOR ALLERGIES 90 tablet 0     Misc Natural Products (OSTEO BI-FLEX JOINT SHIELD PO) Take by mouth daily       Multiple Vitamin (MULTIVITAMINS PO)        Omega-3 Fatty Acids (OMEGA-3 FISH  OIL PO)        order for DME Equipment being ordered: bilateral knee sleeves 2 each 0     GLUCOSAMINE-CHONDROITIN PO        lidocaine (XYLOCAINE) 5 % ointment Apply 2 g topically 4 times daily as needed for moderate pain (Patient not taking: Reported on 2/18/2019) 50 g 1     methocarbamol (ROBAXIN) 750 MG tablet Take 1 tablet (750 mg) by mouth 3 times daily as needed for muscle spasms (Patient not taking: Reported on 2/18/2019) 20 tablet 1     Allergies   Allergen Reactions     Dust Mites      No Clinical Screening - See Comments Other (See Comments)     No Known Drug Allergy      Seasonal Allergies      BP Readings from Last 3 Encounters:   02/18/19 111/73   09/25/18 120/72   09/07/18 115/78    Wt Readings from Last 3 Encounters:   02/18/19 97.5 kg (215 lb)   09/25/18 94.8 kg (209 lb)   09/07/18 94.8 kg (209 lb)                    Reviewed and updated as needed this visit by clinical staff  Tobacco  Allergies  Meds  Med Hx  Surg Hx  Fam Hx  Soc Hx      Reviewed and updated as needed this visit by Provider  Tobacco  Allergies  Meds  Med Hx  Surg Hx  Fam Hx  Soc Hx        ROS:  Constitutional, HEENT, cardiovascular, pulmonary, gi and gu systems are negative, except as otherwise noted.    OBJECTIVE:     /73   Pulse 77   Temp 98.7  F (37.1  C) (Tympanic)   Wt 97.5 kg (215 lb)   SpO2 97%   Breastfeeding? No   BMI 37.46 kg/m    Body mass index is 37.46 kg/m .  GENERAL: healthy, alert and no distress  MS: RLE exam shows left knee with effusion in all areas, tenderness along medial joint line, ROM decreased due to pain in flexion.  Pain with full extension.  Normal stability.  LLE exam shows effusion posterior to knee, pain with flexion and extension.  ROM limited in flexion.  SKIN: no suspicious lesions or rashes  NEURO: Normal strength and tone, mentation intact and speech normal  BACK: no CVA tenderness, no paralumbar tenderness  PSYCH: mentation appears normal, affect normal/bright    Diagnostic  Test Results:  none     ASSESSMENT/PLAN:     1. Pain and swelling of left knee  Concern for Baker's cyst on this side.  Ortho follow up and MRI to rule out meniscal involvement.   - order for DME; Equipment being ordered: bilateral knee sleeves  Dispense: 2 each; Refill: 0  - ORTHOPEDICS ADULT REFERRAL  - MR Knee Left w/o Contrast; Future    2. Pain and swelling of right knee  - order for DME; Equipment being ordered: bilateral knee sleeves  Dispense: 2 each; Refill: 0  - ORTHOPEDICS ADULT REFERRAL    3. Primary osteoarthritis of both knees  - order for DME; Equipment being ordered: bilateral knee sleeves  Dispense: 2 each; Refill: 0  - ORTHOPEDICS ADULT REFERRAL  - MR Knee Left w/o Contrast; Future    4. Morbid obesity (H)  Patient unable to exercise due to above.  Discussed healthier food options and trying to avoid emotional eating.      Patient Instructions   See Dr. Cunningham here. Scheduling information below.  Please try to do MRI prior to that      QUANG Wade [facility]

## 2019-02-19 ENCOUNTER — TELEPHONE (OUTPATIENT)
Dept: FAMILY MEDICINE | Facility: CLINIC | Age: 49
End: 2019-02-19

## 2019-02-19 NOTE — TELEPHONE ENCOUNTER
.Reason for Call:    orders for [facility]NETIC [facility]ON[facility] IMAGING/adjust    Detailed comments: Patient noticed on her paperwork that the imaging is ordered for the left knee and its the right kneeythat is more bothersome, although Patient states they both are a problem; her MAGNETIC RESONANCE IMAGING is scheduled for 2-21/please review and give Patient a call;     Phone Number Patient can be reached at: Cell number on file:    Telephone Information:   Mobile [phone]       Best Time: anytime    Can we leave a detailed message on this number? YES    Call taken on 2/19/2019 at 7:48 AM by Neyda Linares

## 2019-02-19 NOTE — TELEPHONE ENCOUNTER
Patient informed of doctors recommendation and will go to the MRI appointment on 2/21/2019.    Geeta Warner MA on 2/19/2019 at 10:56 AM

## 2019-02-19 NOTE — TELEPHONE ENCOUNTER
MRI ordered on left knee for concern for meniscus involvement based on her symptoms and location of swelling.  If she prefers to wait to see ortho before imaging, that would be fine.  Thanks,  QUANG Wade CNP

## 2019-02-21 ENCOUNTER — ANCILLARY PROCEDURE (OUTPATIENT)
Dept: MRI IMAGING | Facility: CLINIC | Age: 49
End: 2019-02-21
Attending: NURSE PRACTITIONER
Payer: COMMERCIAL

## 2019-02-21 DIAGNOSIS — M17.0 PRIMARY OSTEOARTHRITIS OF BOTH KNEES: ICD-10-CM

## 2019-02-21 DIAGNOSIS — M25.562 PAIN AND SWELLING OF LEFT KNEE: ICD-10-CM

## 2019-02-21 DIAGNOSIS — M25.462 PAIN AND SWELLING OF LEFT KNEE: ICD-10-CM

## 2019-02-21 PROCEDURE — 73721 MRI JNT OF LWR EXTRE W/O DYE: CPT | Mod: LT | Performed by: RADIOLOGY

## 2019-02-28 ENCOUNTER — OFFICE VISIT (OUTPATIENT)
Dept: ORTHOPEDICS | Facility: CLINIC | Age: 49
End: 2019-02-28
Payer: COMMERCIAL

## 2019-02-28 VITALS
OXYGEN SATURATION: 97 % | WEIGHT: 215 LBS | SYSTOLIC BLOOD PRESSURE: 108 MMHG | RESPIRATION RATE: 13 BRPM | HEIGHT: 64 IN | DIASTOLIC BLOOD PRESSURE: 74 MMHG | BODY MASS INDEX: 36.7 KG/M2 | HEART RATE: 86 BPM

## 2019-02-28 DIAGNOSIS — M17.0 PRIMARY OSTEOARTHRITIS OF BOTH KNEES: Primary | ICD-10-CM

## 2019-02-28 PROCEDURE — 99243 OFF/OP CNSLTJ NEW/EST LOW 30: CPT | Mod: 25 | Performed by: ORTHOPAEDIC SURGERY

## 2019-02-28 PROCEDURE — 20610 DRAIN/INJ JOINT/BURSA W/O US: CPT | Mod: 50 | Performed by: ORTHOPAEDIC SURGERY

## 2019-02-28 RX ADMIN — METHYLPREDNISOLONE ACETATE 80 MG: 80 INJECTION, SUSPENSION INTRA-ARTICULAR; INTRALESIONAL; INTRAMUSCULAR; SOFT TISSUE at 16:37

## 2019-02-28 RX ADMIN — LIDOCAINE HYDROCHLORIDE 5 ML: 10 INJECTION, SOLUTION INFILTRATION; PERINEURAL at 16:37

## 2019-02-28 ASSESSMENT — MIFFLIN-ST. JEOR: SCORE: 1582.29

## 2019-02-28 NOTE — PROGRESS NOTES
Large Joint Injection/Arthocentesis: bilateral knee  Date/Time: 2/28/2019 4:37 PM  Performed by: Hernesto Cunningham MD  Authorized by: Hernesto Cunningham MD     Indications:  Osteoarthritis  Needle Size:  22 G  Guidance: landmark guided    Approach:  Anterolateral  Location:  Knee  Laterality:  Bilateral  Site:  Bilateral knee  Medications (Right):  80 mg methylPREDNISolone 80 MG/ML; 5 mL lidocaine 1 %  Medications (Left):  80 mg methylPREDNISolone 80 MG/ML; 5 mL lidocaine 1 %     The patient's bilateral knees were prepped with betadine solution after verification of allergies. Area approximately 10 cm x 10 cm prepped in a sterile fashion. After injection, betadine removed with soap and water and band-aids applied.            Injections were anteromedial.

## 2019-02-28 NOTE — LETTER
2019         RE: Siri Vyas  [address] [place] [address]        Dear Colleague,    Thank you for referring your patient, Siri Vyas, to the [facility]. Please see a copy of my visit note below.    Large Joint Injection/Arthocentesis: bilateral knee  Date/Time: 2019 4:37 PM  Performed by: Hernesto Cunningham MD  Authorized by: Hernesto Cunningham MD     Indications:  Osteoarthritis  Needle Size:  22 G  Guidance: landmark guided    Approach:  Anterolateral  Location:  Knee  Laterality:  Bilateral  Site:  Bilateral knee  Medications (Right):  80 mg methylPREDNISolone 80 MG/ML; 5 mL lidocaine 1 %  Medications (Left):  80 mg methylPREDNISolone 80 MG/ML; 5 mL lidocaine 1 %     The patient's bilateral knees were prepped with betadine solution after verification of allergies. Area approximately 10 cm x 10 cm prepped in a sterile fashion. After injection, betadine removed with soap and water and band-aids applied.            Injections were anteromedial.    Siri Vyas is a 48 year old female who is seen in consultation at the request of Alba Lim CNP  for bilateral knee pain.      Past Medical History:   Diagnosis Date     Anxiety disorder      AR (allergic rhinitis)      Bilateral primary osteoarthritis of knee 10/11/2018     Cancer (H) 2018    breast cancer, lung cancer mother     Cerebral infarction (H)     grandmother     Depression with anxiety 10/2006     High cholesterol 10/2005     Hypertension     Mother  and brother     PE (pulmonary embolism) 2007       Past Surgical History:   Procedure Laterality Date     ABDOMEN SURGERY  96,98,00,04, 99    4 c-sections, dermoid cyst     Dermoid cyst  (OVARY) removal       GYN SURGERY  ,,,          HERNIA REPAIR       LAPAROSCOPIC CHOLECYSTECTOMY  2001     SINUS SURGERY  2002    Nasls sinuses     TONSILLECTOMY  1999       Family History   Problem Relation Age of  Onset     Breast Cancer Mother      Lipids Mother      Hypertension Mother      Hearing Loss Mother      Lung Cancer Mother         2017     Hyperlipidemia Mother      Other Cancer Mother         lung     Esophageal Cancer Mother      Lipids Brother      Vascular Disease Father      Cerebrovascular Disease Maternal Grandmother 76     Neurologic Disorder Son 14        migraines     Breast Cancer Maternal Aunt 70     Breast Cancer Maternal Aunt 80     Hypertension Brother      Hyperlipidemia Brother      Colon Cancer Cousin      Other Cancer Cousin      Other Cancer Other         breast       Social History     Socioeconomic History     Marital status:      Spouse name: Jose Armando     Number of children: 4     Years of education: 16     Highest education level: Not on file   Occupational History     Occupation: Lab tech     Employer: [employer]   Social Needs     Financial resource strain: Not on file     Food insecurity:     Worry: Not on file     Inability: Not on file     Transportation needs:     Medical: Not on file     Non-medical: Not on file   Tobacco Use     Smoking status: Never Smoker     Smokeless tobacco: Never Used     Tobacco comment: no second hand smoke   Substance and Sexual Activity     Alcohol use: Yes     Comment: 1-2 days in a week. Each time 1-2 beers. 0-5 beers weeks     Drug use: No     Sexual activity: Yes     Partners: Male     Comment: [employer]   Lifestyle     Physical activity:     Days per week: Not on file     Minutes per session: Not on file     Stress: Not on file   Relationships     Social connections:     Talks on phone: Not on file     Gets together: Not on file     Attends Buddhism service: Not on file     Active member of club or organization: Not on file     Attends meetings of clubs or organizations: Not on file     Relationship status: Not on file     Intimate partner violence:     Fear of current or ex partner: Not on file     Emotionally abused: Not on  file     Physically abused: Not on file     Forced sexual activity: Not on file   Other Topics Concern     Parent/sibling w/ CABG, MI or angioplasty before 65F 55M? No   Social History Narrative     Not on file       Current Outpatient Medications   Medication Sig Dispense Refill     acetaminophen (TYLENOL) 325 MG tablet Take 325-650 mg by mouth every 6 hours as needed       aspirin 325 MG EC tablet Take 1 tablet (325 mg) by mouth daily 90 tablet 3     fexofenadine (ALLEGRA) 180 MG tablet Take  by mouth. 1 TABLET DAILY FOR ALLERGIES 90 tablet 0     GLUCOSAMINE-CHONDROITIN PO        lidocaine (XYLOCAINE) 5 % ointment Apply 2 g topically 4 times daily as needed for moderate pain 50 g 1     methocarbamol (ROBAXIN) 750 MG tablet Take 1 tablet (750 mg) by mouth 3 times daily as needed for muscle spasms 20 tablet 1     Misc Natural Products (OSTEO BI-FLEX JOINT SHIELD PO) Take by mouth daily       Multiple Vitamin (MULTIVITAMINS PO)        Omega-3 Fatty Acids (OMEGA-3 FISH OIL PO)        order for DME Equipment being ordered: bilateral knee sleeves 2 each 0       Allergies   Allergen Reactions     Dust Mites      No Clinical Screening - See Comments Other (See Comments)     No Known Drug Allergy      Seasonal Allergies        REVIEW OF SYSTEMS:  CONSTITUTIONAL:  NEGATIVE for fever, chills, change in weight, not feeling tired  SKIN:  NEGATIVE for worrisome rashes, no skin lumps, no skin ulcers and no non-healing wounds  EYES:  NEGATIVE for vision changes or irritation.  ENT/MOUTH:  NEGATIVE.  No hearing loss, no hoarseness, no difficulty swallowing.  RESP:  NEGATIVE. No cough or shortness of breath.  CV:  NEGATIVE for chest pain, palpitations or peripheral edema  GI:  NEGATIVE for nausea, abdominal pain, heartburn, or change in bowel habits  :  Negative. No dysuria, no hematuria  MUSCULOSKELETAL:  See HPI above  NEURO:  NEGATIVE . No headaches, no dizziness,  no numbness  ENDOCRINE:  NEGATIVE for temperature intolerance,  "skin/hair changes  HEME/ALLERGY/IMMUNE:  NEGATIVE for bleeding problems  PSYCHIATRIC:  NEGATIVE. no anxiety, no depression.      Exam:  Vitals: /74   Pulse 86   Resp 13   Ht 1.613 m (5' 3.5\")   Wt 97.5 kg (215 lb)   SpO2 97%   BMI 37.49 kg/m     BMI= Body mass index is 37.49 kg/m .  Constitutional:  healthy, alert and no distress  Neuro: Alert and Oriented x 3, Sensation grossly WNL.  HEENT:  Atraumatic, EOMI  Neck:  Neck supple with no tenderness.  Psych: Affect normal   Respiratory: Breathing not labored.  Cardiovascular: normal peripheral pulses  Lymph: no adenopathy  Skin: No rashes,worrisome lesions or skin problems  Spine: straight, no straight leg raising pain.  Hips show full range of motion.  There is no tenderness over the sacro-iliac joints, sciatic notch, or greater trochanters.       Again, thank you for allowing me to participate in the care of your patient.        Sincerely,        Hernesto Cunningham MD    "

## 2019-02-28 NOTE — PATIENT INSTRUCTIONS
You have had a steroid injection today.  For the first 2 hours there will likely be some numbing in the joint from the lidocaine.  This is a good sign, indicating that the injection is in the right place.  In 2 hours the lidocaine will wear off, and the joint will hurt like you had a shot.  Each day the cortisone makes it feel better.  It reaches peak effect in 2 weeks.  We expect it to last for 3 months.  You may resume regular activity when you feel ready.  If you are diabetic, your glucoses will be quite high for several days.

## 2019-03-01 RX ORDER — LIDOCAINE HYDROCHLORIDE 10 MG/ML
5 INJECTION, SOLUTION INFILTRATION; PERINEURAL
Status: DISCONTINUED | OUTPATIENT
Start: 2019-02-28 | End: 2024-07-31

## 2019-03-01 RX ORDER — METHYLPREDNISOLONE ACETATE 80 MG/ML
80 INJECTION, SUSPENSION INTRA-ARTICULAR; INTRALESIONAL; INTRAMUSCULAR; SOFT TISSUE
Status: DISCONTINUED | OUTPATIENT
Start: 2019-02-28 | End: 2024-07-31

## 2019-03-01 NOTE — PROGRESS NOTES
Siri Vyas is a 48 year old female who is seen in consultation at the request of Alba Lim CNP  for bilateral knee pain.      Past Medical History:   Diagnosis Date     Anxiety disorder      AR (allergic rhinitis)      Bilateral primary osteoarthritis of knee 10/11/2018     Cancer (H) 2018    breast cancer, lung cancer mother     Cerebral infarction (H)     grandmother     Depression with anxiety 10/2006     High cholesterol 10/2005     Hypertension     Mother  and brother     PE (pulmonary embolism) 2007       Past Surgical History:   Procedure Laterality Date     ABDOMEN SURGERY  96,98,00,04, 99    4 c-sections, dermoid cyst     Dermoid cyst  (OVARY) removal       GYN SURGERY  ,,,          HERNIA REPAIR       LAPAROSCOPIC CHOLECYSTECTOMY       SINUS SURGERY  2002    Nasls sinuses     TONSILLECTOMY  1999       Family History   Problem Relation Age of Onset     Breast Cancer Mother      Lipids Mother      Hypertension Mother      Hearing Loss Mother      Lung Cancer Mother         2017     Hyperlipidemia Mother      Other Cancer Mother         lung     Esophageal Cancer Mother      Lipids Brother      Vascular Disease Father      Cerebrovascular Disease Maternal Grandmother 76     Neurologic Disorder Son 14        migraines     Breast Cancer Maternal Aunt 70     Breast Cancer Maternal Aunt 80     Hypertension Brother      Hyperlipidemia Brother      Colon Cancer Cousin      Other Cancer Cousin      Other Cancer Other         breast       Social History     Socioeconomic History     Marital status:      Spouse name: Jose Armando     Number of children: 4     Years of education: 16     Highest education level: Not on file   Occupational History     Occupation: Lab tech     Employer: [employer]   Social Needs     Financial resource strain: Not on file     Food insecurity:     Worry: Not on file     Inability: Not on file     Transportation  needs:     Medical: Not on file     Non-medical: Not on file   Tobacco Use     Smoking status: Never Smoker     Smokeless tobacco: Never Used     Tobacco comment: no second hand smoke   Substance and Sexual Activity     Alcohol use: Yes     Comment: 1-2 days in a week. Each time 1-2 beers. 0-5 beers weeks     Drug use: No     Sexual activity: Yes     Partners: Male     Comment: [employer]   Lifestyle     Physical activity:     Days per week: Not on file     Minutes per session: Not on file     Stress: Not on file   Relationships     Social connections:     Talks on phone: Not on file     Gets together: Not on file     Attends Quaker service: Not on file     Active member of club or organization: Not on file     Attends meetings of clubs or organizations: Not on file     Relationship status: Not on file     Intimate partner violence:     Fear of current or ex partner: Not on file     Emotionally abused: Not on file     Physically abused: Not on file     Forced sexual activity: Not on file   Other Topics Concern     Parent/sibling w/ CABG, MI or angioplasty before 65F 55M? No   Social History Narrative     Not on file       Current Outpatient Medications   Medication Sig Dispense Refill     acetaminophen (TYLENOL) 325 MG tablet Take 325-650 mg by mouth every 6 hours as needed       aspirin 325 MG EC tablet Take 1 tablet (325 mg) by mouth daily 90 tablet 3     fexofenadine (ALLEGRA) 180 MG tablet Take  by mouth. 1 TABLET DAILY FOR ALLERGIES 90 tablet 0     GLUCOSAMINE-CHONDROITIN PO        lidocaine (XYLOCAINE) 5 % ointment Apply 2 g topically 4 times daily as needed for moderate pain 50 g 1     methocarbamol (ROBAXIN) 750 MG tablet Take 1 tablet (750 mg) by mouth 3 times daily as needed for muscle spasms 20 tablet 1     Misc Natural Products (OSTEO BI-FLEX JOINT SHIELD PO) Take by mouth daily       Multiple Vitamin (MULTIVITAMINS PO)        Omega-3 Fatty Acids (OMEGA-3 FISH OIL PO)        order for DME Equipment being  "ordered: bilateral knee sleeves 2 each 0       Allergies   Allergen Reactions     Dust Mites      No Clinical Screening - See Comments Other (See Comments)     No Known Drug Allergy      Seasonal Allergies        REVIEW OF SYSTEMS:  CONSTITUTIONAL:  NEGATIVE for fever, chills, change in weight, not feeling tired  SKIN:  NEGATIVE for worrisome rashes, no skin lumps, no skin ulcers and no non-healing wounds  EYES:  NEGATIVE for vision changes or irritation.  ENT/MOUTH:  NEGATIVE.  No hearing loss, no hoarseness, no difficulty swallowing.  RESP:  NEGATIVE. No cough or shortness of breath.  CV:  NEGATIVE for chest pain, palpitations or peripheral edema  GI:  NEGATIVE for nausea, abdominal pain, heartburn, or change in bowel habits  :  Negative. No dysuria, no hematuria  MUSCULOSKELETAL:  See HPI above  NEURO:  NEGATIVE . No headaches, no dizziness,  no numbness  ENDOCRINE:  NEGATIVE for temperature intolerance, skin/hair changes  HEME/ALLERGY/IMMUNE:  NEGATIVE for bleeding problems  PSYCHIATRIC:  NEGATIVE. no anxiety, no depression.      Exam:  Vitals: /74   Pulse 86   Resp 13   Ht 1.613 m (5' 3.5\")   Wt 97.5 kg (215 lb)   SpO2 97%   BMI 37.49 kg/m    BMI= Body mass index is 37.49 kg/m .  Constitutional:  healthy, alert and no distress  Neuro: Alert and Oriented x 3, Sensation grossly WNL.  HEENT:  Atraumatic, EOMI  Neck:  Neck supple with no tenderness.  Psych: Affect normal   Respiratory: Breathing not labored.  Cardiovascular: normal peripheral pulses  Lymph: no adenopathy  Skin: No rashes,worrisome lesions or skin problems  Spine: straight, no straight leg raising pain.  Hips show full range of motion.  There is no tenderness over the sacro-iliac joints, sciatic notch, or greater trochanters.     "

## 2019-03-02 NOTE — PROGRESS NOTES
Visit Date:   02/28/2019      HISTORY OF PRESENT ILLNESS:  Ms. Vyas is a 48-year-old female I was asked to see in consultation at the request of Alba Lim for evaluation of bilateral knee pain.  She has had this for approximately 6 months with pain in the knees, both calves and thighs.  She has severe pain.  She has occasional numbness of the legs.  She has pain with standing, walking, climbing stairs and exercise.  She has used acetaminophen, ibuprofen and physical therapy.  She had steroid injection in the knees in 09/2018 but since then the left knee has gotten much worse.  She had pain and tightness in the back of the knee with some locking and giving out.  Thus, she underwent MRI scan of the knee showing a tear in the posterior horn of the medial meniscus in the posterior root with a radial tear.  There were no displaced flaps.  There was diffuse thinning of the patellar cartilage and fissuring in the trochlea.  There were areas of high-grade full thickness cartilage loss involving the medial articular surface of the femur and tibia.  There were small areas of full thickness cartilage loss on the lateral tibia.  She feels the right knee is getting significant as well.  Examination shows good mobility of the knees from 0 to approximately 110-115 degrees.  She has pain with movement and primarily medial joint tenderness on both.  She has diffuse pains with medial Sherri but no definite posteromedial pain.  She had some posterior tenderness.  She had no pain with varus valgus stress and no instability.  Cruciates MCL and LCL were intact.  She has trace effusion of both knees, no increased warmth, erythema.  Sensation and circulation are intact.      ASSESSMENT:  Bilateral knee osteoarthritis, mild to moderate.  She does have a posterior root tear of the medial meniscus on the left, but no displaced fragments.  She did not have definite posteromedial pain with McBurney, thus I am not sure if the meniscus  is causing her trouble itself.  We discussed options and decided to proceed with steroid injection of each knee.  This was performed with 80 mg Depo-Medrol and lidocaine.  If this fails to give relief, we could consider arthroscopy in the future.         LADARIUS ISLAS MD             D: 2019   T: 2019   MT: YUKI      Name:     DAIANA FIGUEROA   MRN:      [id number]        Account:      [id number]   :      1970           Visit Date:   2019      Document: [id number]

## 2019-03-28 ENCOUNTER — OFFICE VISIT (OUTPATIENT)
Dept: FAMILY MEDICINE | Facility: CLINIC | Age: 49
End: 2019-03-28
Payer: COMMERCIAL

## 2019-03-28 ENCOUNTER — ANCILLARY PROCEDURE (OUTPATIENT)
Dept: GENERAL RADIOLOGY | Facility: CLINIC | Age: 49
End: 2019-03-28
Attending: NURSE PRACTITIONER
Payer: COMMERCIAL

## 2019-03-28 VITALS
DIASTOLIC BLOOD PRESSURE: 80 MMHG | OXYGEN SATURATION: 97 % | TEMPERATURE: 97.8 F | HEART RATE: 76 BPM | WEIGHT: 210 LBS | SYSTOLIC BLOOD PRESSURE: 110 MMHG | HEIGHT: 64 IN | BODY MASS INDEX: 35.85 KG/M2 | RESPIRATION RATE: 16 BRPM

## 2019-03-28 DIAGNOSIS — Z78.9 HISTORY OF RECENT TRAVEL: ICD-10-CM

## 2019-03-28 DIAGNOSIS — R05.9 COUGH: ICD-10-CM

## 2019-03-28 DIAGNOSIS — R05.9 COUGH: Primary | ICD-10-CM

## 2019-03-28 DIAGNOSIS — R19.7 DIARRHEA OF PRESUMED INFECTIOUS ORIGIN: ICD-10-CM

## 2019-03-28 LAB
ALBUMIN SERPL-MCNC: 3.8 G/DL (ref 3.4–5)
ALP SERPL-CCNC: 68 U/L (ref 40–150)
ALT SERPL W P-5'-P-CCNC: 38 U/L (ref 0–50)
ANION GAP SERPL CALCULATED.3IONS-SCNC: 10 MMOL/L (ref 3–14)
AST SERPL W P-5'-P-CCNC: 25 U/L (ref 0–45)
BASOPHILS # BLD AUTO: 0 10E9/L (ref 0–0.2)
BASOPHILS NFR BLD AUTO: 0.2 %
BILIRUB SERPL-MCNC: 0.3 MG/DL (ref 0.2–1.3)
BUN SERPL-MCNC: 8 MG/DL (ref 7–30)
CALCIUM SERPL-MCNC: 9.4 MG/DL (ref 8.5–10.1)
CHLORIDE SERPL-SCNC: 104 MMOL/L (ref 94–109)
CO2 SERPL-SCNC: 25 MMOL/L (ref 20–32)
CREAT SERPL-MCNC: 0.49 MG/DL (ref 0.52–1.04)
DIFFERENTIAL METHOD BLD: NORMAL
EOSINOPHIL # BLD AUTO: 0.1 10E9/L (ref 0–0.7)
EOSINOPHIL NFR BLD AUTO: 1.1 %
ERYTHROCYTE [DISTWIDTH] IN BLOOD BY AUTOMATED COUNT: 13.1 % (ref 10–15)
GFR SERPL CREATININE-BSD FRML MDRD: >90 ML/MIN/{1.73_M2}
GLUCOSE SERPL-MCNC: 88 MG/DL (ref 70–99)
HCT VFR BLD AUTO: 42.2 % (ref 35–47)
HGB BLD-MCNC: 14.1 G/DL (ref 11.7–15.7)
LYMPHOCYTES # BLD AUTO: 2.7 10E9/L (ref 0.8–5.3)
LYMPHOCYTES NFR BLD AUTO: 25.6 %
MCH RBC QN AUTO: 29 PG (ref 26.5–33)
MCHC RBC AUTO-ENTMCNC: 33.4 G/DL (ref 31.5–36.5)
MCV RBC AUTO: 87 FL (ref 78–100)
MONOCYTES # BLD AUTO: 0.7 10E9/L (ref 0–1.3)
MONOCYTES NFR BLD AUTO: 6.5 %
NEUTROPHILS # BLD AUTO: 7 10E9/L (ref 1.6–8.3)
NEUTROPHILS NFR BLD AUTO: 66.6 %
PLATELET # BLD AUTO: 392 10E9/L (ref 150–450)
POTASSIUM SERPL-SCNC: 4 MMOL/L (ref 3.4–5.3)
PROT SERPL-MCNC: 8.3 G/DL (ref 6.8–8.8)
RBC # BLD AUTO: 4.87 10E12/L (ref 3.8–5.2)
SODIUM SERPL-SCNC: 139 MMOL/L (ref 133–144)
WBC # BLD AUTO: 10.5 10E9/L (ref 4–11)

## 2019-03-28 PROCEDURE — 36415 COLL VENOUS BLD VENIPUNCTURE: CPT | Performed by: NURSE PRACTITIONER

## 2019-03-28 PROCEDURE — 99214 OFFICE O/P EST MOD 30 MIN: CPT | Performed by: NURSE PRACTITIONER

## 2019-03-28 PROCEDURE — 71046 X-RAY EXAM CHEST 2 VIEWS: CPT

## 2019-03-28 PROCEDURE — 85025 COMPLETE CBC W/AUTO DIFF WBC: CPT | Performed by: NURSE PRACTITIONER

## 2019-03-28 PROCEDURE — 80053 COMPREHEN METABOLIC PANEL: CPT | Performed by: NURSE PRACTITIONER

## 2019-03-28 RX ORDER — BENZONATATE 200 MG/1
200 CAPSULE ORAL 3 TIMES DAILY PRN
Qty: 21 CAPSULE | Refills: 0 | Status: SHIPPED | OUTPATIENT
Start: 2019-03-28 | End: 2020-10-14

## 2019-03-28 ASSESSMENT — PAIN SCALES - GENERAL: PAINLEVEL: NO PAIN (0)

## 2019-03-28 ASSESSMENT — MIFFLIN-ST. JEOR: SCORE: 1559.61

## 2019-03-28 NOTE — PROGRESS NOTES
SUBJECTIVE:   Siri Vyas is a 48 year old female who presents to clinic today for the following health issues:    Acute Illness   Acute illness concerns: cough   Onset: 1 week- was recently on vacation to the [facility] [place]     Fever: no    Chills/Sweats: no    Headache (location?): YES    Sinus Pressure:no    Conjunctivitis:  no    Ear Pain: no    Rhinorrhea: no    Congestion: YES    Sore Throat: no     Cough: YES-productive of clear sputum and non productive     Wheeze: YES    Decreased Appetite: YES- isn't sure if recovering from vacation or not     Nausea: no    Vomiting: no    Diarrhea:  YES- but can be from vacation patient states that its been like this since vacation and that there has been blood as well. Patient states she doesn't know if its from food or something she drank.     Dysuria/Freq.: no    Fatigue/Achiness: YES- Fatigue    Sick/Strep Exposure: YES- Son has mono, no other exposures     Therapies Tried and outcome: Humidifier, tylenol (LD 3 hours ago), increase in water take     [place] ago, was there 1 week.  Diarrhea- had bloody diarrhea while there x 5 days, some bloody diarrhea since getting back, no blood now for about 5 days, no diarrhea x 3 days but no stool at all for last 2 days  Not eating much for last few days  Diarrhea was not mucousy, just liquid  Never had fever while there or since returning.      Problem list and histories reviewed & adjusted, as indicated.  Additional history: as documented    Patient Active Problem List   Diagnosis     Seasonal allergic rhinitis     CARDIOVASCULAR SCREENING; LDL GOAL LESS THAN 160     S/P cholecystectomy     Dermoid cyst of ovary     H/O:  section     LUQ abdominal pain     Paresthesias/numbness     Median neuropathy     Epicondylitis, medial humeral     Tendinitis of left wrist     Headache     Chondromalacia, knee, left     Chondromalacia patellae, right     CTS (carpal tunnel syndrome)     Family history of  malignant neoplasm of breast     Class 2 obesity due to excess calories without serious comorbidity with body mass index (BMI) of 39.0 to 39.9 in adult     Saphenous vein clot, left     Thrombophlebitis leg     Pain and swelling of left knee     Acute left-sided low back pain without sciatica     Grief reaction     Primary osteoarthritis of both knees     Pain and swelling of right knee     Obesity (BMI 35.0-39.9) with comorbidity (H)     Past Surgical History:   Procedure Laterality Date     ABDOMEN SURGERY  96,98,00,04, 99    4 c-sections, dermoid cyst     Dermoid cyst  (OVARY) removal       GYN SURGERY  ,,,          HERNIA REPAIR       LAPAROSCOPIC CHOLECYSTECTOMY  2001     SINUS SURGERY  2002    Nasls sinuses     TONSILLECTOMY  1999       Social History     Tobacco Use     Smoking status: Never Smoker     Smokeless tobacco: Never Used     Tobacco comment: no second hand smoke   Substance Use Topics     Alcohol use: Yes     Comment: 1-2 days in a week. Each time 1-2 beers. 0-5 beers weeks     Family History   Problem Relation Age of Onset     Breast Cancer Mother      Lipids Mother      Hypertension Mother      Hearing Loss Mother      Lung Cancer Mother         2017     Hyperlipidemia Mother      Other Cancer Mother         lung     Esophageal Cancer Mother      Lipids Brother      Vascular Disease Father      Cerebrovascular Disease Maternal Grandmother 76     Neurologic Disorder Son 14        migraines     Breast Cancer Maternal Aunt 70     Breast Cancer Maternal Aunt 80     Hypertension Brother      Hyperlipidemia Brother      Colon Cancer Cousin      Other Cancer Cousin      Other Cancer Other         breast         Current Outpatient Medications   Medication Sig Dispense Refill     acetaminophen (TYLENOL) 325 MG tablet Take 325-650 mg by mouth every 6 hours as needed       aspirin 325 MG EC tablet Take 1 tablet (325 mg) by mouth daily 90 tablet 3     fexofenadine (ALLEGRA)  "180 MG tablet Take  by mouth. 1 TABLET DAILY FOR ALLERGIES 90 tablet 0     GLUCOSAMINE-CHONDROITIN PO        Misc Natural Products (OSTEO BI-FLEX JOINT SHIELD PO) Take by mouth daily       Multiple Vitamin (MULTIVITAMINS PO)        Omega-3 Fatty Acids (OMEGA-3 FISH OIL PO)        order for DME Equipment being ordered: bilateral knee sleeves 2 each 0     lidocaine (XYLOCAINE) 5 % ointment Apply 2 g topically 4 times daily as needed for moderate pain (Patient not taking: Reported on 3/28/2019) 50 g 1     methocarbamol (ROBAXIN) 750 MG tablet Take 1 tablet (750 mg) by mouth 3 times daily as needed for muscle spasms (Patient not taking: Reported on 3/28/2019) 20 tablet 1     Allergies   Allergen Reactions     Dust Mites      No Clinical Screening - See Comments Other (See Comments)     No Known Drug Allergy      Seasonal Allergies      BP Readings from Last 3 Encounters:   03/28/19 110/80   02/28/19 108/74   02/18/19 111/73    Wt Readings from Last 3 Encounters:   03/28/19 95.3 kg (210 lb)   02/28/19 97.5 kg (215 lb)   02/18/19 97.5 kg (215 lb)                    Reviewed and updated as needed this visit by clinical staff  Tobacco  Allergies  Meds  Med Hx  Surg Hx  Fam Hx  Soc Hx      Reviewed and updated as needed this visit by Provider  Tobacco  Allergies  Meds  Med Hx  Surg Hx  Fam Hx  Soc Hx        ROS:  Constitutional, HEENT, cardiovascular, pulmonary, GI, , musculoskeletal, neuro, skin, endocrine and psych systems are negative, except as otherwise noted.    OBJECTIVE:     /80 (BP Location: Left arm, Patient Position: Sitting, Cuff Size: Adult Large)   Pulse 76   Temp 97.8  F (36.6  C) (Oral)   Resp 16   Ht 1.613 m (5' 3.5\")   Wt 95.3 kg (210 lb)   SpO2 97%   Breastfeeding? No   BMI 36.62 kg/m    Body mass index is 36.62 kg/m .  GENERAL: healthy, alert and no distress  EYES: Eyes grossly normal to inspection, PERRL and conjunctivae and sclerae normal  HENT: ear canals and TM's normal, " nose and mouth without ulcers or lesions  NECK: no adenopathy, no asymmetry, masses, or scars and thyroid normal to palpation  RESP: lungs clear to auscultation - no rales, rhonchi or wheezes  CV: regular rate and rhythm, normal S1 S2, no S3 or S4, no murmur, click or rub, no peripheral edema and peripheral pulses strong  ABDOMEN: tenderness mild and generalized and bowel sounds hyperactive, no masses  MS: no gross musculoskeletal defects noted, no edema    Diagnostic Test Results:  Results for orders placed or performed in visit on 03/28/19 (from the past 24 hour(s))   CBC with platelets and differential   Result Value Ref Range    WBC 10.5 4.0 - 11.0 10e9/L    RBC Count 4.87 3.8 - 5.2 10e12/L    Hemoglobin 14.1 11.7 - 15.7 g/dL    Hematocrit 42.2 35.0 - 47.0 %    MCV 87 78 - 100 fl    MCH 29.0 26.5 - 33.0 pg    MCHC 33.4 31.5 - 36.5 g/dL    RDW 13.1 10.0 - 15.0 %    Platelet Count 392 150 - 450 10e9/L    % Neutrophils 66.6 %    % Lymphocytes 25.6 %    % Monocytes 6.5 %    % Eosinophils 1.1 %    % Basophils 0.2 %    Absolute Neutrophil 7.0 1.6 - 8.3 10e9/L    Absolute Lymphocytes 2.7 0.8 - 5.3 10e9/L    Absolute Monocytes 0.7 0.0 - 1.3 10e9/L    Absolute Eosinophils 0.1 0.0 - 0.7 10e9/L    Absolute Basophils 0.0 0.0 - 0.2 10e9/L    Diff Method Automated Method         XR CHEST 2 VW 3/28/2019 2:56 PM    HISTORY: Cough. Recent travel.    COMPARISON: None.    FINDINGS: No airspace consolidation, pleural effusion or pneumothorax.  Normal heart size.      Impression     IMPRESSION: No acute cardiopulmonary abnormality.    ANA DOW MD         ASSESSMENT/PLAN:     1. Cough  Xray done due to recent travel.  Normal.  No indication of bacterial cause so supportive care advised.    - XR Chest 2 Views; Future  - benzonatate (TESSALON) 200 MG capsule; Take 1 capsule (200 mg) by mouth 3 times daily as needed for cough  Dispense: 21 capsule; Refill: 0    2. Diarrhea of presumed infectious origin  Given concern over E.coli  and wanting to avoid HUS, will await stool sample prior to treating.  Also normal CBC today and no diarrhea x 2 days so could have self-resolved.    - Clostridium difficile Toxin B PCR; Future  - Enteric Bacteria and Virus Panel by MARIA[facility] Stool; Future  - Ova and Parasite Exam Routine; Future  - CBC with platelets and differential  - Comprehensive metabolic panel (BMP + Alb, Alk Phos, ALT, AST, Total. Bili, TP)    3. History of recent travel  - XR Chest 2 Views; Future  - Clostridium difficile Toxin B PCR; Future  - Enteric Bacteria and Virus Panel by MARIAH Stool; Future  - Ova and Parasite Exam Routine; Future  - CBC with platelets and differential  - Comprehensive metabolic panel (BMP + Alb, Alk Phos, ALT, AST, Total. Bili, TP)    Patient Instructions   The chest xray did not show any pneumonia.  The cough could be related to viral illness such as viral bronchitis.    For your symptoms:  -take tessalon (benzonatate) three times a day to help with cough (nondrowsy-sent to pharmacy)  Your symptoms and exam today indicate that you have a viral upper respiratory illness.  This includes viral rhinosinusitis and viral bronchitis.  Antibiotics do not help viral illnesses; the best remedies treat the symptoms (see below).  The typical course of a viral illness is that you feel miserable for the first few days - with sore throat, runny nose/nasal congestion, cough, and sometimes fever and body aches.  You should start to feel better after about 5-7 days and much better by 10-14 days.  If you develop sudden worsening of symptoms or fever after the first 5-7 days, or if you have persistence of your symptoms beyond 14 days, let us know as you may have developed a secondary bacterial infection.   -please drink a lot of fluid- water is best- 8-10 glasses a day.   -rest as much as possible  -Gargle with warm salt water a few times a day to relieve a sore throat  Throat sprays or lozenges may also help relieve the pain  -Use saline  (salt water) nose drops to help loosen mucus and moisten the tender skin in your nose  -Can use Mucinex (gauifenesin) 600-1200 mg twice a day for congestion (non-stimulating)  -You can take Benadryl 12.5-50 mg(diphenhydramine) at night if symptoms are severe.  Will also help with sleep.  It will make you very drowsy, however, so make sure you have at least 8 hours to sleep.    For the diarrhea:  Seems to have resolved somewhat.  Recommend still bringing in the stool samples to rule out ongoing infection.  Recommend starting to eat small meals of bland food without too much fat or spice to avoid stomach upset.  Make sure to drink at least 8 glasses of water a day.  If the bloody diarrhea returns or you become febrile, please let me know.  I will follow up with you about labs tomorrow.            QUANG Wade [facility]

## 2019-03-28 NOTE — PATIENT INSTRUCTIONS
The chest xray did not show any pneumonia.  The cough could be related to viral illness such as viral bronchitis.    For your symptoms:  -take tessalon (benzonatate) three times a day to help with cough (nondrowsy-sent to pharmacy)  Your symptoms and exam today indicate that you have a viral upper respiratory illness.  This includes viral rhinosinusitis and viral bronchitis.  Antibiotics do not help viral illnesses; the best remedies treat the symptoms (see below).  The typical course of a viral illness is that you feel miserable for the first few days - with sore throat, runny nose/nasal congestion, cough, and sometimes fever and body aches.  You should start to feel better after about 5-7 days and much better by 10-14 days.  If you develop sudden worsening of symptoms or fever after the first 5-7 days, or if you have persistence of your symptoms beyond 14 days, let us know as you may have developed a secondary bacterial infection.   -please drink a lot of fluid- water is best- 8-10 glasses a day.   -rest as much as possible  -Gargle with warm salt water a few times a day to relieve a sore throat  Throat sprays or lozenges may also help relieve the pain  -Use saline (salt water) nose drops to help loosen mucus and moisten the tender skin in your nose  -Can use Mucinex (gauifenesin) 600-1200 mg twice a day for congestion (non-stimulating)  -You can take Benadryl 12.5-50 mg(diphenhydramine) at night if symptoms are severe.  Will also help with sleep.  It will make you very drowsy, however, so make sure you have at least 8 hours to sleep.    For the diarrhea:  Seems to have resolved somewhat.  Recommend still bringing in the stool samples to rule out ongoing infection.  Recommend starting to eat small meals of bland food without too much fat or spice to avoid stomach upset.  Make sure to drink at least 8 glasses of water a day.  If the bloody diarrhea returns or you become febrile, please let me know.  I will follow  up with you about labs tomorrow.

## 2019-11-09 ENCOUNTER — HEALTH MAINTENANCE LETTER (OUTPATIENT)
Age: 49
End: 2019-11-09

## 2020-02-23 ENCOUNTER — HEALTH MAINTENANCE LETTER (OUTPATIENT)
Age: 50
End: 2020-02-23

## 2020-02-27 ENCOUNTER — OFFICE VISIT (OUTPATIENT)
Dept: FAMILY MEDICINE | Facility: CLINIC | Age: 50
End: 2020-02-27
Payer: COMMERCIAL

## 2020-02-27 VITALS
DIASTOLIC BLOOD PRESSURE: 73 MMHG | TEMPERATURE: 98.5 F | HEART RATE: 91 BPM | BODY MASS INDEX: 38.72 KG/M2 | SYSTOLIC BLOOD PRESSURE: 114 MMHG | HEIGHT: 64 IN | OXYGEN SATURATION: 98 % | RESPIRATION RATE: 18 BRPM | WEIGHT: 226.8 LBS

## 2020-02-27 DIAGNOSIS — Z13.1 SCREENING FOR DIABETES MELLITUS: ICD-10-CM

## 2020-02-27 DIAGNOSIS — Z12.31 ENCOUNTER FOR SCREENING MAMMOGRAM FOR BREAST CANCER: ICD-10-CM

## 2020-02-27 DIAGNOSIS — Z12.4 SCREENING FOR MALIGNANT NEOPLASM OF CERVIX: ICD-10-CM

## 2020-02-27 DIAGNOSIS — Z13.6 CARDIOVASCULAR SCREENING; LDL GOAL LESS THAN 160: ICD-10-CM

## 2020-02-27 DIAGNOSIS — Z00.00 ROUTINE GENERAL MEDICAL EXAMINATION AT A HEALTH CARE FACILITY: Primary | ICD-10-CM

## 2020-02-27 PROCEDURE — 99396 PREV VISIT EST AGE 40-64: CPT | Performed by: FAMILY MEDICINE

## 2020-02-27 ASSESSMENT — MIFFLIN-ST. JEOR: SCORE: 1630.82

## 2020-02-27 ASSESSMENT — PATIENT HEALTH QUESTIONNAIRE - PHQ9: SUM OF ALL RESPONSES TO PHQ QUESTIONS 1-9: 7

## 2020-02-27 ASSESSMENT — PAIN SCALES - GENERAL: PAINLEVEL: NO PAIN (0)

## 2020-02-27 NOTE — PROGRESS NOTES
SUBJECTIVE:   CC: Siri Vyas is an 49 year old woman who presents for preventive health visit.     Healthy Habits:    Do you get at least three servings of calcium containing foods daily (dairy, green leafy vegetables, etc.)? yes    Amount of exercise or daily activities, outside of work: none    Problems taking medications regularly No    Medication side effects: No    Have you had an eye exam in the past two years? yes    Do you see a dentist twice per year? yes    Do you have sleep apnea, excessive snoring or daytime drowsiness?no        Today's PHQ-2 Score:   PHQ-2 (  [employer]) 2020   Q1: Little interest or pleasure in doing things 0 0   Q2: Feeling down, depressed or hopeless 1 0   PHQ-2 Score 1 0       Abuse: Current or Past(Physical, Sexual or Emotional)- No  Do you feel safe in your environment? Yes        Social History     Tobacco Use     Smoking status: Never Smoker     Smokeless tobacco: Never Used     Tobacco comment: no second hand smoke   Substance Use Topics     Alcohol use: Yes     Comment: 1-2 days in a week. Each time 1-2 beers. 0-5 beers weeks     If you drink alcohol do you typically have >3 drinks per day or >7 drinks per week? No                     Reviewed orders with patient.  Reviewed health maintenance and updated orders accordingly - Yes  Lab work is in process  BP Readings from Last 3 Encounters:   20 114/73   19 110/80   19 108/74    Wt Readings from Last 3 Encounters:   20 102.9 kg (226 lb 12.8 oz)   19 95.3 kg (210 lb)   19 97.5 kg (215 lb)                  Patient Active Problem List   Diagnosis     Seasonal allergic rhinitis     CARDIOVASCULAR SCREENING; LDL GOAL LESS THAN 160     S/P cholecystectomy     Dermoid cyst of ovary     H/O:  section     LUQ abdominal pain     Paresthesias/numbness     Median neuropathy     Epicondylitis, medial humeral     Tendinitis of left wrist     Headache     Chondromalacia, knee,  left     Chondromalacia patellae, right     CTS (carpal tunnel syndrome)     Family history of malignant neoplasm of breast     Class 2 obesity due to excess calories without serious comorbidity with body mass index (BMI) of 39.0 to 39.9 in adult     Saphenous vein clot, left     Thrombophlebitis leg     Pain and swelling of left knee     Acute left-sided low back pain without sciatica     Grief reaction     Primary osteoarthritis of both knees     Pain and swelling of right knee     Obesity (BMI 35.0-39.9) with comorbidity (H)     Past Surgical History:   Procedure Laterality Date     ABDOMEN SURGERY  96,98,00,04, 99    4 c-sections, dermoid cyst     Dermoid cyst  (OVARY) removal       GYN SURGERY  ,,,          HERNIA REPAIR       LAPAROSCOPIC CHOLECYSTECTOMY       SINUS SURGERY  2002    Nasls sinuses     TONSILLECTOMY  1999       Social History     Tobacco Use     Smoking status: Never Smoker     Smokeless tobacco: Never Used     Tobacco comment: no second hand smoke   Substance Use Topics     Alcohol use: Yes     Comment: 1-2 days in a week. Each time 1-2 beers. 0-5 beers weeks     Family History   Problem Relation Age of Onset     Breast Cancer Mother      Lipids Mother      Hypertension Mother      Hearing Loss Mother      Lung Cancer Mother         2017     Hyperlipidemia Mother      Other Cancer Mother         lung     Esophageal Cancer Mother      LUNG DISEASE Mother      Lipids Brother      Vascular Disease Father      Cerebrovascular Disease Maternal Grandmother 76     Neurologic Disorder Son 14        migraines     Breast Cancer Maternal Aunt 70     Breast Cancer Maternal Aunt 80     Hypertension Brother      Hyperlipidemia Brother      Colon Cancer Cousin      Other Cancer Cousin      Other Cancer Other         breast         Current Outpatient Medications   Medication Sig Dispense Refill     acetaminophen (TYLENOL) 325 MG tablet Take 325-650 mg by mouth every 6  hours as needed       aspirin 325 MG EC tablet Take 1 tablet (325 mg) by mouth daily 90 tablet 3     benzonatate (TESSALON) 200 MG capsule Take 1 capsule (200 mg) by mouth 3 times daily as needed for cough 21 capsule 0     fexofenadine (ALLEGRA) 180 MG tablet Take  by mouth. 1 TABLET DAILY FOR ALLERGIES 90 tablet 0     GLUCOSAMINE-CHONDROITIN PO        lidocaine (XYLOCAINE) 5 % ointment Apply 2 g topically 4 times daily as needed for moderate pain 50 g 1     methocarbamol (ROBAXIN) 750 MG tablet Take 1 tablet (750 mg) by mouth 3 times daily as needed for muscle spasms 20 tablet 1     Misc Natural Products (OSTEO BI-FLEX JOINT SHIELD PO) Take by mouth daily       Multiple Vitamin (MULTIVITAMINS PO)        Omega-3 Fatty Acids (OMEGA-3 FISH OIL PO)        Allergies   Allergen Reactions     Dust Mites      No Clinical Screening - See Comments Other (See Comments)     No Known Drug Allergy      Seasonal Allergies        Mammogram Screening: Patient over age 50, mutual decision to screen reflected in health maintenance.    Pertinent mammograms are reviewed under the imaging tab.  History of abnormal Pap smear: NO - age 30-65 PAP every 5 years with negative HPV co-testing recommended     Reviewed and updated as needed this visit by clinical staff  Tobacco  Meds  Med Hx  Surg Hx  Fam Hx  Soc Hx        Reviewed and updated as needed this visit by Provider            ROS:  CONSTITUTIONAL: NEGATIVE for fever, chills, change in weight  INTEGUMENTARY/SKIN: NEGATIVE for worrisome rashes, moles or lesions  EYES: NEGATIVE for vision changes or irritation  ENT: NEGATIVE for ear, mouth and throat problems  RESP: NEGATIVE for significant cough or SOB  BREAST: NEGATIVE for masses, tenderness or discharge  CV: NEGATIVE for chest pain, palpitations or peripheral edema  GI: NEGATIVE for nausea, abdominal pain, heartburn, or change in bowel habits  : NEGATIVE for unusual urinary or vaginal symptoms. No vaginal  "bleeding.  MUSCULOSKELETAL: NEGATIVE for significant arthralgias or myalgia  NEURO: NEGATIVE for weakness, dizziness or paresthesias  PSYCHIATRIC: NEGATIVE for changes in mood or affect     OBJECTIVE:   /73 (BP Location: Left arm, Patient Position: Sitting, Cuff Size: Adult Large)   Pulse 91   Temp 98.5  F (36.9  C) (Oral)   Resp 18   Ht 1.613 m (5' 3.5\")   Wt 102.9 kg (226 lb 12.8 oz)   LMP 02/15/2020 (LMP Unknown)   SpO2 98%   Breastfeeding No   BMI 39.55 kg/m    EXAM:  GENERAL: healthy, alert and no distress  NECK: no adenopathy, no asymmetry, masses, or scars and thyroid normal to palpation  RESP: lungs clear to auscultation - no rales, rhonchi or wheezes  CV: regular rate and rhythm, normal S1 S2, no S3 or S4, no murmur, click or rub, no peripheral edema and peripheral pulses strong  ABDOMEN: soft, nontender, no hepatosplenomegaly, no masses and bowel sounds normal  MS: no gross musculoskeletal defects noted, no edema    Diagnostic Test Results:  Labs reviewed in Epic    ASSESSMENT/PLAN:   1. Routine general medical examination at a health care facility  As below.    2. CARDIOVASCULAR SCREENING; LDL GOAL LESS THAN 160    - Comprehensive metabolic panel (BMP + Alb, Alk Phos, ALT, AST, Total. Bili, TP); Future  - Lipid panel reflex to direct LDL Fasting; Future    3. Screening for diabetes mellitus    - Comprehensive metabolic panel (BMP + Alb, Alk Phos, ALT, AST, Total. Bili, TP); Future    4. Screening for malignant neoplasm of cervix  Patient sees Gynecology. Will have them send us results.    5. Encounter for screening mammogram for breast cancer  [facility]      COUNSELING:   Reviewed preventive health counseling, as reflected in patient instructions       Regular exercise       Healthy diet/nutrition       Vision screening    Estimated body mass index is 39.55 kg/m  as calculated from the following:    Height as of this encounter: 1.613 m (5' 3.5\").    Weight as of this encounter: " 102.9 kg (226 lb 12.8 oz).         reports that she has never smoked. She has never used smokeless tobacco.      Counseling Resources:  ATP IV Guidelines  Pooled Cohorts Equation Calculator  Breast Cancer Risk Calculator  FRAX Risk Assessment  ICSI Preventive Guidelines  Dietary Guidelines for Americans, 2010  USDA's MyPlate  ASA Prophylaxis  Lung CA Screening    Chapito Coleman MD, MD  [facility]

## 2020-02-28 DIAGNOSIS — Z13.6 CARDIOVASCULAR SCREENING; LDL GOAL LESS THAN 160: ICD-10-CM

## 2020-02-28 DIAGNOSIS — Z13.1 SCREENING FOR DIABETES MELLITUS: ICD-10-CM

## 2020-02-28 LAB
ALBUMIN SERPL-MCNC: 3.5 G/DL (ref 3.4–5)
ALP SERPL-CCNC: 68 U/L (ref 40–150)
ALT SERPL W P-5'-P-CCNC: 25 U/L (ref 0–50)
ANION GAP SERPL CALCULATED.3IONS-SCNC: 8 MMOL/L (ref 3–14)
AST SERPL W P-5'-P-CCNC: 14 U/L (ref 0–45)
BILIRUB SERPL-MCNC: 0.4 MG/DL (ref 0.2–1.3)
BUN SERPL-MCNC: 9 MG/DL (ref 7–30)
CALCIUM SERPL-MCNC: 9.1 MG/DL (ref 8.5–10.1)
CHLORIDE SERPL-SCNC: 107 MMOL/L (ref 94–109)
CHOLEST SERPL-MCNC: 219 MG/DL
CO2 SERPL-SCNC: 24 MMOL/L (ref 20–32)
CREAT SERPL-MCNC: 0.6 MG/DL (ref 0.52–1.04)
GFR SERPL CREATININE-BSD FRML MDRD: >90 ML/MIN/{1.73_M2}
GLUCOSE SERPL-MCNC: 93 MG/DL (ref 70–99)
HDLC SERPL-MCNC: 61 MG/DL
LDLC SERPL CALC-MCNC: 134 MG/DL
NONHDLC SERPL-MCNC: 158 MG/DL
POTASSIUM SERPL-SCNC: 3.8 MMOL/L (ref 3.4–5.3)
PROT SERPL-MCNC: 7.9 G/DL (ref 6.8–8.8)
SODIUM SERPL-SCNC: 139 MMOL/L (ref 133–144)
TRIGL SERPL-MCNC: 122 MG/DL

## 2020-02-28 PROCEDURE — 80053 COMPREHEN METABOLIC PANEL: CPT | Performed by: FAMILY MEDICINE

## 2020-02-28 PROCEDURE — 36415 COLL VENOUS BLD VENIPUNCTURE: CPT | Performed by: FAMILY MEDICINE

## 2020-02-28 PROCEDURE — 80061 LIPID PANEL: CPT | Performed by: FAMILY MEDICINE

## 2020-09-15 ENCOUNTER — TRANSFERRED RECORDS (OUTPATIENT)
Dept: HEALTH INFORMATION MANAGEMENT | Facility: CLINIC | Age: 50
End: 2020-09-15

## 2020-09-15 LAB
CHOLEST SERPL-MCNC: 244 MG/DL
GLUCOSE SERPL-MCNC: 91 MG/DL (ref 50–100)
HDLC SERPL-MCNC: 62 MG/DL
LDLC SERPL CALC-MCNC: 137 MG/DL
TRIGL SERPL-MCNC: 227 MG/DL
TSH SERPL-ACNC: 2.25 UIU/ML (ref 0.36–3.74)

## 2020-10-14 ENCOUNTER — OFFICE VISIT (OUTPATIENT)
Dept: FAMILY MEDICINE | Facility: CLINIC | Age: 50
End: 2020-10-14
Payer: COMMERCIAL

## 2020-10-14 VITALS
WEIGHT: 226 LBS | BODY MASS INDEX: 38.58 KG/M2 | TEMPERATURE: 97.9 F | SYSTOLIC BLOOD PRESSURE: 113 MMHG | DIASTOLIC BLOOD PRESSURE: 76 MMHG | HEIGHT: 64 IN | HEART RATE: 79 BPM | OXYGEN SATURATION: 96 % | RESPIRATION RATE: 16 BRPM

## 2020-10-14 DIAGNOSIS — E78.1 HYPERTRIGLYCERIDEMIA: Primary | ICD-10-CM

## 2020-10-14 PROCEDURE — 99213 OFFICE O/P EST LOW 20 MIN: CPT | Performed by: FAMILY MEDICINE

## 2020-10-14 ASSESSMENT — MIFFLIN-ST. JEOR: SCORE: 1627.19

## 2020-10-14 ASSESSMENT — PAIN SCALES - GENERAL: PAINLEVEL: NO PAIN (0)

## 2020-10-14 NOTE — PATIENT INSTRUCTIONS
At [facility], we strive to deliver an exceptional experience to you, every time we see you. If you receive a survey, please complete it as we do value your feedback.  If you have MyChart, you can expect to receive results automatically within 24 hours of their completion.  Your provider will send a note interpreting your results as well.   If you do not have MyChart, you should receive your results in about a week by mail.    Your care team:                            Family Medicine Internal Medicine   MD Nathaniel Piña MD Shantel Branch-Fleming, MD Srinivasa Vaka, MD Katya Georgiev PA-C Megan Hill, APRN CNP    Chapito Coleman, MD Pediatrics   Eric Arriloa, PAItzC  Alba Lim, CNP MD Sandhya Jackson APRN CNP   MD Siria Mittal MD Deborah Mielke, MD Kay Mayen, APRN CNP  Mariama Smith, PAItzC  Jaida Luna, CNP  MD Mimi Blake MD Angela Wermerskirchen, MD      Clinic hours: Monday - Thursday 7 am-7 pm; Fridays 7 am-5 pm.   Urgent care: Monday - Friday 11 am-9 pm; Saturday and Sunday 9 am-5 pm.    Clinic: [phone]       [facility]: Monday - Thursday 8 am - 7 pm; Friday 8 am - 6 pm  [facility]: [phone]     Use [email or web address] for 24/7 diagnosis and treatment of dozens of conditions.

## 2020-10-14 NOTE — PROGRESS NOTES
"Jayne Vyas is a 49 year old female who presents to clinic today for the following health issues:    HPI         Go over cholesterol result done 9/15/2020 at OB/GYN visit.    Review of Systems   Constitutional, HEENT, cardiovascular, pulmonary, GI, , musculoskeletal, neuro, skin, endocrine and psych systems are negative, except as otherwise noted.      Objective    /76 (BP Location: Left arm, Patient Position: Sitting, Cuff Size: Adult Large)   Pulse 79   Temp 97.9  F (36.6  C) (Tympanic)   Resp 16   Ht 1.613 m (5' 3.5\")   Wt 102.5 kg (226 lb)   LMP  (LMP Unknown)   SpO2 96%   BMI 39.41 kg/m    Body mass index is 39.41 kg/m .  Physical Exam   GENERAL: healthy, alert and no distress  NECK: no adenopathy, no asymmetry, masses, or scars and thyroid normal to palpation  RESP: lungs clear to auscultation - no rales, rhonchi or wheezes  CV: regular rate and rhythm, normal S1 S2, no S3 or S4, no murmur, click or rub, no peripheral edema and peripheral pulses strong  ABDOMEN: soft, nontender, no hepatosplenomegaly, no masses and bowel sounds normal  MS: no gross musculoskeletal defects noted, no edema            Assessment & Plan     Hypertriglyceridemia  Discussed no need for medications at this time. Discussed lifestyle changes with low fat diet and regular exercise to help with this. We will recheck this at next physical.       BMI:   Estimated body mass index is 39.41 kg/m  as calculated from the following:    Height as of this encounter: 1.613 m (5' 3.5\").    Weight as of this encounter: 102.5 kg (226 lb).            Work on weight loss  Regular exercise  See Patient Instructions    Return in about 6 months (around 4/14/2021) for Physical Exam.    Chapito Coleman MD, MD  [facility]    "

## 2020-11-23 ENCOUNTER — VIRTUAL VISIT (OUTPATIENT)
Dept: FAMILY MEDICINE | Facility: OTHER | Age: 50
End: 2020-11-23
Payer: COMMERCIAL

## 2020-11-23 DIAGNOSIS — Z20.822 SUSPECTED COVID-19 VIRUS INFECTION: Primary | ICD-10-CM

## 2020-11-23 PROCEDURE — 99421 OL DIG E/M SVC 5-10 MIN: CPT | Performed by: FAMILY MEDICINE

## 2020-11-23 NOTE — PROGRESS NOTES
"Date: 2020 10:29:56  Clinician: Kwaku Merlos  Clinician NPI: [id number]  Patient: Siri Vyas  Patient : 1970  Patient Address: [address] [place], [place] [address] [address]  Patient Phone: [phone]  Visit Protocol: URI  Patient Summary:  Siri is a 50 year old ( : 1970 ) female who initiated a [facility] Visit for COVID-19 (Coronavirus) evaluation and screening. When asked the question \"Please sign me up to receive news, health information and promotions from [facility].\", Siri responded \"No\".    Siri states her symptoms started suddenly 3-4 days ago. After her symptoms started, they improved and then got worse again.   Her symptoms consist of facial pain or pressure, myalgia, malaise, a sore throat, diarrhea, a headache, nasal congestion, and anosmia.   Symptom details     Nasal secretions: The color of her mucus is clear.    Sore throat: Siri reports having mild throat pain (1-3 on a 10 point pain scale), does not have exudate on her tonsils, and can swallow liquids. She is not sure if the lymph nodes in her neck are enlarged. A rash has not appeared on the skin since the sore throat started.     Facial pain or pressure: The facial pain or pressure does not feel worse when bending or leaning forward.     Headache: She states the headache is moderate (4-6 on a 10 point pain scale).      Siri denies having vomiting, rhinitis, chills, teeth pain, ageusia, ear pain, wheezing, fever, cough, and nausea. She also denies taking antibiotic medication in the past month, having recent facial or sinus surgery in the past 60 days, and having a sinus infection within the past year. She is not experiencing dyspnea.   Precipitating events  Within the past week, Siri has not been exposed to someone with strep throat. She has not recently been exposed to someone with influenza. Siri has been in close contact with the following high risk individuals: adults 65 or older.   Pertinent COVID-19 (Coronavirus) " information  Siri does not work or volunteer as healthcare worker or a . In the past 14 days, Siri has not worked or volunteered at a healthcare facility or group living setting.   In the past 14 days, she also has not lived in a congregate living setting.   Siri has not had a close contact with a laboratory-confirmed COVID-19 patient within 14 days of symptom onset.    Since December 2019, Siri has been tested for COVID-19 and has not had upper respiratory infection or influenza-like illness.      Result of COVID-19 test: Negative     Date of her COVID-19 test: 06/23/2020      Pertinent medical history  Siri does not get yeast infections when she takes antibiotics.   Siri does not need a return to work/school note.   Weight: 225 lbs   Siri does not smoke or use smokeless tobacco.   Weight: 225 lbs    MEDICATIONS: omega 3-dha-epa-ala-fish oil-flaxseed oil-vitamin E oral, vitamin A-vitamin D3-vitamin E-vitamin K oral, Allegra-D 12 Hour oral, Multiple Vitamin-Minerals oral, ALLERGIES: NKDA  Clinician Response:  Dear Siri,   Your symptoms show that you may have coronavirus (COVID-19). This illness can cause fever, cough and trouble breathing. Many people get a mild case and get better on their own. Some people can get very sick.  What should I do?  We would like to test you for this virus.   1. Please call [phone] to schedule your visit. Explain that you were referred by [facility] to have a COVID-19 test. Be ready to share your [facility] visit ID number.  * If you need to schedule in [facility] please call [phone] or for [employer] employees please call [phone].  * If you need to schedule in the [place] area please call [phone]. [place] employees call [phone].  The following will serve as your written order for this COVID Test, ordered by me, for the indication of suspected COVID [Z20.828]: The test will be ordered in [employer], our electronic health record, after you are scheduled.  "It will show as ordered and authorized by Jay Acosta MD.  Order: COVID-19 (Coronavirus) PCR [facility]TOMATIC [facility].   2. When it's time for your COVID test:  Stay at least 6 feet away from others. (If someone will drive you to your test, stay in the backseat, as far away from the  as you can.)   Cover your mouth and nose with a mask, tissue or washcloth.  Go straight to the testing site. Don't make any stops on the way there or back.      3.Starting now: Stay home and away from others (self-isolate) until:   You've had no fever---and no medicine that reduces fever---for one full day (24 hours). And...   Your other symptoms have gotten better. For example, your cough or breathing has improved. And...   At least 10 days have passed since your symptoms started.       During this time, don't leave the house except for testing or medical care.   Stay in your own room, even for meals. Use your own bathroom if you can.   Stay away from others in your home. No hugging, kissing or shaking hands. No visitors.  Don't go to work, school or anywhere else.    Clean \"high touch\" surfaces often (doorknobs, counters, handles, etc.). Use a household cleaning spray or wipes. You'll find a full list of  on the EPA website: www.epa.gov/pesticide-registration/list-n-disinfectants-use-against-sars-cov-2.   Cover your mouth and nose with a mask, tissue or washcloth to avoid spreading germs.  Wash your hands and face often. Use soap and water.  Caregivers in these groups are at risk for severe illness due to COVID-19:  o People 65 years and older  o People who live in a nursing home or long-term care facility  o People with chronic disease (lung, heart, cancer, diabetes, kidney, liver, immunologic)  o People who have a weakened immune system, including those who:   Are in cancer treatment  Take medicine that weakens the immune system, such as corticosteroids  Had a bone marrow or organ transplant  Have an immune " deficiency  Have poorly controlled HIV or AIDS  Are obese (body mass index of 40 or higher)  Smoke regularly   o Caregivers should wear gloves while washing dishes, handling laundry and cleaning bedrooms and bathrooms.  o Use caution when washing and drying laundry: Don't shake dirty laundry, and use the warmest water setting that you can.  o For more tips, go to www.cdc.gov/coronavirus/2019-ncov/downloads/10Things.pdf.    4.Sign up for [employer]. We know it's scary to hear that you might have COVID-19. We want to track your symptoms to make sure you're okay over the next 2 weeks. Please look for an email from [employer]---this is a free, online program that we'll use to keep in touch. To sign up, follow the link in the email. Learn more at [email or web address]  How can I take care of myself?   Get lots of rest. Drink extra fluids (unless a doctor has told you not to).   Take Tylenol (acetaminophen) for fever or pain. If you have liver or kidney problems, ask your family doctor if it's okay to take Tylenol.   Adults can take either:    650 mg (two 325 mg pills) every 4 to 6 hours, or...   1,000 mg (two 500 mg pills) every 8 hours as needed.    Note: Don't take more than 3,000 mg in one day. Acetaminophen is found in many medicines (both prescribed and over-the-counter medicines). Read all labels to be sure you don't take too much.   For children, check the Tylenol bottle for the right dose. The dose is based on the child's age or weight.    If you have other health problems (like cancer, heart failure, an organ transplant or severe kidney disease): Call your specialty clinic if you don't feel better in the next 2 days.       Know when to call 911. Emergency warning signs include:    Trouble breathing or shortness of breath Pain or pressure in the chest that doesn't go away Feeling confused like you haven't felt before, or not being able to wake up Bluish-colored lips or face.  Where can I get more  information?   [facility] -- About COVID-19: [email or web address]   CDC -- What to Do If You're Sick: www.cdc.gov/coronavirus/2019-ncov/about/steps-when-sick.html   [facility] [place][note 2] -- Ending Home Isolation: www.cdc.gov/coronavirus/2019-ncov/hcp/disposition-in-home-patients.html   [facility] [place][note 2] -- Caring for Someone: www.cdc.gov/coronavirus/2019-ncov/if-you-are-sick/care-for-someone.html   [facility] -- Interim Guidance for Hospital Discharge to Home: www.[facility].[place]./diseases/coronavirus/hcp/hospdischarge.pdf   [facility] clinical trials (COVID-19 research studies): clinicalaffairs.[facility]-clinical-trials    Below are the COVID-19 hotlines at the [place] Department of Health ([facility]). Interpreters are available.    For health questions: Call 250-753-2941 or 1-366.157.7284 (7 a.m. to 7 p.m.) For questions about schools and childcare: Call [phone] or 1-752.101.1941 (7 a.m. to 7 p.m.)    Diagnosis: Contact with and (suspected) exposure to other viral communicable diseases  Diagnosis ICD: Z20.828

## 2020-11-24 DIAGNOSIS — Z20.822 SUSPECTED 2019 NOVEL CORONAVIRUS INFECTION: Primary | ICD-10-CM

## 2020-11-24 DIAGNOSIS — Z20.822 SUSPECTED COVID-19 VIRUS INFECTION: ICD-10-CM

## 2020-11-24 PROCEDURE — U0003 INFECTIOUS AGENT DETECTION BY NUCLEIC ACID (DNA OR RNA); SEVERE ACUTE RESPIRATORY SYNDROME CORONAVIRUS 2 (SARS-COV-2) (CORONAVIRUS DISEASE [COVID-19]), AMPLIFIED PROBE TECHNIQUE, MAKING USE OF HIGH THROUGHPUT TECHNOLOGIES AS DESCRIBED BY CMS-2020-01-R: HCPCS | Performed by: FAMILY MEDICINE

## 2020-11-25 LAB
SARS-COV-2 RNA SPEC QL NAA+PROBE: NOT DETECTED
SPECIMEN SOURCE: NORMAL

## 2021-02-20 ENCOUNTER — HEALTH MAINTENANCE LETTER (OUTPATIENT)
Age: 51
End: 2021-02-20

## 2021-04-11 ENCOUNTER — HEALTH MAINTENANCE LETTER (OUTPATIENT)
Age: 51
End: 2021-04-11

## 2021-05-03 ENCOUNTER — IMMUNIZATION (OUTPATIENT)
Dept: PEDIATRICS | Facility: CLINIC | Age: 51
End: 2021-05-03
Payer: COMMERCIAL

## 2021-05-03 PROCEDURE — 0001A PR COVID VAC PFIZER DIL RECON 30 MCG/0.3 ML IM: CPT

## 2021-05-03 PROCEDURE — 91300 PR COVID VAC PFIZER DIL RECON 30 MCG/0.3 ML IM: CPT

## 2021-05-24 ENCOUNTER — IMMUNIZATION (OUTPATIENT)
Dept: PEDIATRICS | Facility: CLINIC | Age: 51
End: 2021-05-24
Attending: INTERNAL MEDICINE
Payer: COMMERCIAL

## 2021-05-24 PROCEDURE — 0002A PR COVID VAC PFIZER DIL RECON 30 MCG/0.3 ML IM: CPT

## 2021-05-24 PROCEDURE — 91300 PR COVID VAC PFIZER DIL RECON 30 MCG/0.3 ML IM: CPT

## 2021-09-21 ENCOUNTER — OFFICE VISIT (OUTPATIENT)
Dept: URGENT CARE | Facility: URGENT CARE | Age: 51
End: 2021-09-21
Payer: COMMERCIAL

## 2021-09-21 VITALS
HEART RATE: 96 BPM | SYSTOLIC BLOOD PRESSURE: 117 MMHG | BODY MASS INDEX: 39.93 KG/M2 | TEMPERATURE: 98.1 F | OXYGEN SATURATION: 96 % | DIASTOLIC BLOOD PRESSURE: 83 MMHG | WEIGHT: 229 LBS

## 2021-09-21 DIAGNOSIS — H04.202 EYE TEARING, LEFT: ICD-10-CM

## 2021-09-21 DIAGNOSIS — R51.9 ACUTE NONINTRACTABLE HEADACHE, UNSPECIFIED HEADACHE TYPE: Primary | ICD-10-CM

## 2021-09-21 PROCEDURE — 99214 OFFICE O/P EST MOD 30 MIN: CPT | Mod: 25 | Performed by: NURSE PRACTITIONER

## 2021-09-21 PROCEDURE — 96372 THER/PROPH/DIAG INJ SC/IM: CPT | Performed by: NURSE PRACTITIONER

## 2021-09-21 RX ORDER — KETOROLAC TROMETHAMINE 30 MG/ML
30 INJECTION, SOLUTION INTRAMUSCULAR; INTRAVENOUS ONCE
Status: COMPLETED | OUTPATIENT
Start: 2021-09-21 | End: 2021-09-21

## 2021-09-21 RX ORDER — KETOROLAC TROMETHAMINE 10 MG/1
10 TABLET, FILM COATED ORAL EVERY 6 HOURS PRN
Qty: 20 TABLET | Refills: 0 | Status: SHIPPED | OUTPATIENT
Start: 2021-09-21 | End: 2021-09-26

## 2021-09-21 RX ADMIN — KETOROLAC TROMETHAMINE 30 MG: 30 INJECTION, SOLUTION INTRAMUSCULAR; INTRAVENOUS at 15:27

## 2021-09-21 ASSESSMENT — ENCOUNTER SYMPTOMS
RHINORRHEA: 0
NAUSEA: 0
SORE THROAT: 0
FEVER: 0
COUGH: 0
CHILLS: 0
DIARRHEA: 0
HEADACHES: 1
VOMITING: 0
SHORTNESS OF BREATH: 0

## 2021-09-21 NOTE — PROGRESS NOTES
SUBJECTIVE:   Siri Vyas is a 50 year old female presenting with a chief complaint of   Chief Complaint   Patient presents with     Headache     Eye Problem     tearing left eye       She is an established patient of [facility].    Headache    Onset of symptoms was 1day(s).  Course of illness is worsening  Severity moderate  Character of pain:sharp, aching and throbbing   Current and associated symptoms: headache,  watery left eye, and now with running nose  Location of pain: global  Prodromal sx?:  No  History of Migranes: No  Is headache similar to previous: Yes  Predisposing factors: going throug so much stress  Treatment and measures tried: Tylenol and Ibuprofen        Review of Systems   Constitutional: Negative for chills and fever.   HENT: Negative for congestion, ear pain, rhinorrhea and sore throat.    Eyes:        Tearing left eye   Respiratory: Negative for cough and shortness of breath.    Gastrointestinal: Negative for diarrhea, nausea and vomiting.   Neurological: Positive for headaches.   All other systems reviewed and are negative.      Past Medical History:   Diagnosis Date     Anxiety disorder      AR (allergic rhinitis)      Bilateral primary osteoarthritis of knee 10/11/2018     Cancer (H) 2000, 2018    breast cancer, lung cancer mother     Cerebral infarction (H)     grandmother     Depression with anxiety 10/2006     High cholesterol 10/2005     Hypertension     Mother  and brother     PE (pulmonary embolism) 01/2007     Family History   Problem Relation Age of Onset     Breast Cancer Mother      Lipids Mother      Hypertension Mother      Hearing Loss Mother      Lung Cancer Mother         2017     Hyperlipidemia Mother      Other Cancer Mother         lung     Esophageal Cancer Mother      LUNG DISEASE Mother      Lipids Brother      Vascular Disease Father      Cerebrovascular Disease Maternal Grandmother 76     Neurologic Disorder Son 14        migraines     Breast Cancer Maternal Aunt 70      Breast Cancer Maternal Aunt 80     Hypertension Brother      Hyperlipidemia Brother      Colon Cancer Cousin      Other Cancer Cousin      Other Cancer Other         breast     Current Outpatient Medications   Medication Sig Dispense Refill     acetaminophen (TYLENOL) 325 MG tablet Take 325-650 mg by mouth every 6 hours as needed       aspirin 325 MG EC tablet Take 1 tablet (325 mg) by mouth daily 90 tablet 3     Cholecalciferol (VITAMIN D3) 25 MCG (1000 UT) CAPS Take 2,000 Units by mouth       fexofenadine (ALLEGRA) 180 MG tablet Take  by mouth. 1 TABLET DAILY FOR ALLERGIES 90 tablet 0     GLUCOSAMINE-CHONDROITIN PO        ketorolac (TORADOL) 10 MG tablet Take 1 tablet (10 mg) by mouth every 6 hours as needed for moderate pain 20 tablet 0     Misc Natural Products (OSTEO BI-FLEX JOINT SHIELD PO) Take by mouth daily       Multiple Vitamin (MULTIVITAMINS PO)        Omega-3 Fatty Acids (OMEGA-3 FISH OIL PO)        Social History     Tobacco Use     Smoking status: Never Smoker     Smokeless tobacco: Never Used     Tobacco comment: no second hand smoke   Substance Use Topics     Alcohol use: Yes     Comment: 1-2 days in a week. Each time 1-2 beers. 0-5 beers weeks       OBJECTIVE  /83 (BP Location: Left arm, Patient Position: Sitting, Cuff Size: Adult Large)   Pulse 96   Temp 98.1  F (36.7  C) (Tympanic)   Wt 103.9 kg (229 lb)   SpO2 96%   Breastfeeding No   BMI 39.93 kg/m      Physical Exam  Vitals and nursing note reviewed.   Constitutional:       General: She is not in acute distress.     Appearance: She is well-developed. She is not diaphoretic.   HENT:      Head: Normocephalic and atraumatic.      Right Ear: Tympanic membrane and external ear normal.      Left Ear: Tympanic membrane and external ear normal.   Eyes:      General: Lids are normal. Lids are everted, no foreign bodies appreciated.         Left eye: Discharge present.     Pupils: Pupils are equal, round, and reactive to light.    Pulmonary:      Effort: Pulmonary effort is normal. No respiratory distress.      Breath sounds: Normal breath sounds.   Musculoskeletal:      Cervical back: Normal range of motion and neck supple.   Lymphadenopathy:      Cervical: No cervical adenopathy.   Skin:     General: Skin is warm and dry.   Neurological:      Mental Status: She is alert.      Cranial Nerves: No cranial nerve deficit.         ASSESSMENT:      ICD-10-CM    1. Acute nonintractable headache, unspecified headache type  R51.9 ketorolac (TORADOL) injection 30 mg     ketorolac (TORADOL) 10 MG tablet   2. Eye tearing, left  H04.202     Differential Diagnosis:  Headache:  Tension headache, Common migraine, Classic migraine, Menstrual migraine, Concussion, Sinusitis    PLAN:  Patient has denied any foreign substance in the eye.  The left eye is watering.  We will use Toradol for headache.  I have advised to see an eye doctor or go to ER if symptoms are worsening.   Patient educational/instructional material provided including reasons for follow-up    The patient indicates understanding of these issues and agrees with the plan.          Patient Instructions     Patient Education     Understanding Headache Pain  Headache pain can start in different structures in the head. The brain itself doesn't hurt, but other parts of the head do. Headache is a common symptom of illness, such as a cold or the flu. At other times, headaches happen without seeming to be connected to any illness. These are known as primary headaches. Examples of primary headaches include migraine and tension headaches. Very rarely are headaches a sign of a serious medical problem.     What is referred pain?  Referred pain has its source in one place, but is felt in another. For example, pain behind the eyes may actually be caused by tense muscles in the neck and shoulders. This means that the place that hurts may not be the part of the head that needs treatment.   [employer] last  reviewed this educational content on 5/1/2018 2000-2021 [employer]. All rights reserved. This information is not intended as a substitute for professional medical care. Always follow your healthcare professional's instructions.

## 2021-09-21 NOTE — PATIENT INSTRUCTIONS
Patient Education     Understanding Headache Pain  Headache pain can start in different structures in the head. The brain itself doesn't hurt, but other parts of the head do. Headache is a common symptom of illness, such as a cold or the flu. At other times, headaches happen without seeming to be connected to any illness. These are known as primary headaches. Examples of primary headaches include migraine and tension headaches. Very rarely are headaches a sign of a serious medical problem.     What is referred pain?  Referred pain has its source in one place, but is felt in another. For example, pain behind the eyes may actually be caused by tense muscles in the neck and shoulders. This means that the place that hurts may not be the part of the head that needs treatment.   [employer] last reviewed this educational content on 5/1/2018 2000-2021 [employer]. All rights reserved. This information is not intended as a substitute for professional medical care. Always follow your healthcare professional's instructions.

## 2021-09-26 ENCOUNTER — HEALTH MAINTENANCE LETTER (OUTPATIENT)
Age: 51
End: 2021-09-26

## 2021-11-09 ENCOUNTER — OFFICE VISIT (OUTPATIENT)
Dept: URGENT CARE | Facility: URGENT CARE | Age: 51
End: 2021-11-09
Payer: COMMERCIAL

## 2021-11-09 VITALS
DIASTOLIC BLOOD PRESSURE: 79 MMHG | OXYGEN SATURATION: 96 % | RESPIRATION RATE: 16 BRPM | TEMPERATURE: 98.9 F | HEART RATE: 95 BPM | SYSTOLIC BLOOD PRESSURE: 128 MMHG

## 2021-11-09 DIAGNOSIS — Z20.822 SUSPECTED 2019 NOVEL CORONAVIRUS INFECTION: ICD-10-CM

## 2021-11-09 DIAGNOSIS — H66.002 LEFT ACUTE SUPPURATIVE OTITIS MEDIA: ICD-10-CM

## 2021-11-09 DIAGNOSIS — J06.9 VIRAL URI: ICD-10-CM

## 2021-11-09 DIAGNOSIS — R07.0 THROAT PAIN: Primary | ICD-10-CM

## 2021-11-09 LAB — DEPRECATED S PYO AG THROAT QL EIA: NEGATIVE

## 2021-11-09 PROCEDURE — 99214 OFFICE O/P EST MOD 30 MIN: CPT | Performed by: NURSE PRACTITIONER

## 2021-11-09 PROCEDURE — 87651 STREP A DNA AMP PROBE: CPT | Performed by: NURSE PRACTITIONER

## 2021-11-09 PROCEDURE — 99000 SPECIMEN HANDLING OFFICE-LAB: CPT | Performed by: NURSE PRACTITIONER

## 2021-11-09 PROCEDURE — U0003 INFECTIOUS AGENT DETECTION BY NUCLEIC ACID (DNA OR RNA); SEVERE ACUTE RESPIRATORY SYNDROME CORONAVIRUS 2 (SARS-COV-2) (CORONAVIRUS DISEASE [COVID-19]), AMPLIFIED PROBE TECHNIQUE, MAKING USE OF HIGH THROUGHPUT TECHNOLOGIES AS DESCRIBED BY CMS-2020-01-R: HCPCS | Mod: 90 | Performed by: NURSE PRACTITIONER

## 2021-11-09 PROCEDURE — U0005 INFEC AGEN DETEC AMPLI PROBE: HCPCS | Mod: 90 | Performed by: NURSE PRACTITIONER

## 2021-11-09 RX ORDER — PREDNISONE 20 MG/1
20 TABLET ORAL DAILY
Qty: 3 TABLET | Refills: 0 | Status: SHIPPED | OUTPATIENT
Start: 2021-11-09 | End: 2021-11-12

## 2021-11-09 RX ORDER — AMOXICILLIN 875 MG
875 TABLET ORAL 2 TIMES DAILY
Qty: 14 TABLET | Refills: 0 | Status: SHIPPED | OUTPATIENT
Start: 2021-11-09 | End: 2021-11-16

## 2021-11-09 ASSESSMENT — ENCOUNTER SYMPTOMS
CHILLS: 1
MYALGIAS: 1
HEADACHES: 1
SORE THROAT: 1

## 2021-11-09 ASSESSMENT — PAIN SCALES - GENERAL: PAINLEVEL: WORST PAIN (10)

## 2021-11-09 NOTE — PROGRESS NOTES
SUBJECTIVE:   Siri Vyas is a 51 year old female presenting with a chief complaint of   Chief Complaint   Patient presents with     Pharyngitis     Sunday 11/7- woke with chills, sore throat, fever, body aches and headache.        She is an established patient of [facility].    URI Adult    Onset of symptoms was 2 day(s) ago.  Course of illness is worsening.    Severity moderate  Current and Associated symptoms: chills, cough - non-productive, left ear pain,  sore throat, headache and body aches  Treatment measures tried include OTC Cough med.  Predisposing factors include None.        Review of Systems   Constitutional: Positive for chills.   HENT: Positive for ear pain and sore throat.    Musculoskeletal: Positive for myalgias.   Neurological: Positive for headaches.   All other systems reviewed and are negative.      Past Medical History:   Diagnosis Date     Anxiety disorder      AR (allergic rhinitis)      Bilateral primary osteoarthritis of knee 10/11/2018     Cancer (H) 2000, 2018    breast cancer, lung cancer mother     Cerebral infarction (H)     grandmother     Depression with anxiety 10/2006     High cholesterol 10/2005     Hypertension     Mother  and brother     PE (pulmonary embolism) 01/2007     Family History   Problem Relation Age of Onset     Breast Cancer Mother      Lipids Mother      Hypertension Mother      Hearing Loss Mother      Lung Cancer Mother         2017     Hyperlipidemia Mother      Other Cancer Mother         lung     Esophageal Cancer Mother      LUNG DISEASE Mother      Lipids Brother      Vascular Disease Father      Cerebrovascular Disease Maternal Grandmother 76     Neurologic Disorder Son 14        migraines     Breast Cancer Maternal Aunt 70     Breast Cancer Maternal Aunt 80     Hypertension Brother      Hyperlipidemia Brother      Colon Cancer Cousin      Other Cancer Cousin      Other Cancer Other         breast     Current Outpatient Medications   Medication Sig  Dispense Refill     acetaminophen (TYLENOL) 325 MG tablet Take 325-650 mg by mouth every 6 hours as needed       amoxicillin (AMOXIL) 875 MG tablet Take 1 tablet (875 mg) by mouth 2 times daily for 7 days 14 tablet 0     aspirin 325 MG EC tablet Take 1 tablet (325 mg) by mouth daily 90 tablet 3     Cholecalciferol (VITAMIN D3) 25 MCG (1000 UT) CAPS Take 2,000 Units by mouth       fexofenadine (ALLEGRA) 180 MG tablet Take  by mouth. 1 TABLET DAILY FOR ALLERGIES 90 tablet 0     GLUCOSAMINE-CHONDROITIN PO        Misc Natural Products (OSTEO BI-FLEX JOINT SHIELD PO) Take by mouth daily       Multiple Vitamin (MULTIVITAMINS PO)        Omega-3 Fatty Acids (OMEGA-3 FISH OIL PO)        predniSONE (DELTASONE) 20 MG tablet Take 1 tablet (20 mg) by mouth daily for 3 days 3 tablet 0     Social History     Tobacco Use     Smoking status: Never Smoker     Smokeless tobacco: Never Used     Tobacco comment: no second hand smoke   Substance Use Topics     Alcohol use: Yes     Comment: 1-2 days in a week. Each time 1-2 beers. 0-5 beers weeks       OBJECTIVE  /79   Pulse 95   Temp 98.9  F (37.2  C) (Tympanic)   Resp 16   SpO2 96%     Physical Exam  Vitals and nursing note reviewed.   Constitutional:       General: She is not in acute distress.     Appearance: She is well-developed. She is not diaphoretic.   HENT:      Head: Normocephalic and atraumatic.      Right Ear: Tympanic membrane and external ear normal.      Left Ear: External ear normal. Tympanic membrane is erythematous and bulging.      Mouth/Throat:      Pharynx: Posterior oropharyngeal erythema present.      Tonsils: 1+ on the left.   Eyes:      Pupils: Pupils are equal, round, and reactive to light.   Pulmonary:      Effort: Pulmonary effort is normal. No respiratory distress.      Breath sounds: Normal breath sounds.   Musculoskeletal:      Cervical back: Normal range of motion and neck supple.   Lymphadenopathy:      Cervical: No cervical adenopathy.    Skin:     General: Skin is warm and dry.   Neurological:      Mental Status: She is alert.      Cranial Nerves: No cranial nerve deficit.         Labs:  Results for orders placed or performed in visit on 11/09/21 (from the past 24 hour(s))   Streptococcus A Rapid Screen w/Reflex to PCR - Clinic Collect    Specimen: Throat; Swab   Result Value Ref Range    Group A Strep antigen Negative Negative           ASSESSMENT:      ICD-10-CM    1. Throat pain  R07.0 Streptococcus A Rapid Screen w/Reflex to PCR - Clinic Collect     Symptomatic COVID-19 Virus (Coronavirus) by PCR Nose     Group A Streptococcus PCR Throat Swab     predniSONE (DELTASONE) 20 MG tablet   2. Suspected 2019 novel coronavirus infection  Z20.822    3. Viral URI  J06.9    4. Left acute suppurative otitis media  H66.002 amoxicillin (AMOXIL) 875 MG tablet      PLAN:  Plan of care as above  I recommend follow up with PCP in 3 days or sooner if symptoms are getting worse  Advised to take medications as prescribed  Side effects of medications discussed  Over the counter medications discussed  Worrisome symptoms are discussed and instructions to go to the ER.  All questions are answered and patient verbalized understanding and agrees with this plan.  Janette Rader  [facility]  Family Nurse Practitoner        Patient Instructions     Patient Education     When You Have a Sore Throat  A sore throat can be painful. There are many reasons why you may have a sore throat. Your healthcare provider will work with you to find the cause of your sore throat. He or she will also find the best treatment for you.     What causes a sore throat?  Sore throats can be caused or worsened by:     Cold or flu viruses    Bacteria    Irritants such as tobacco smoke or air pollution    Acid reflux  A healthy throat  The tonsils are on the sides of the throat near the base of the tongue. They collect viruses and bacteria and help fight infection. The throat (pharynx) is the passage for  air. Mucus from the nasal cavity also moves down the passage.   An inflamed throat  The tonsils and pharynx can become inflamed due to a cold or flu virus. Postnasal drip (excess mucus draining from the nasal cavity) can irritate the throat. It can also make the throat or tonsils more likely to be infected by bacteria. Severe, untreated tonsillitis in children or adults can cause a pocket of pus (abscess) to form near the tonsil.   Your evaluation  A health evaluation can help find the cause of your sore throat. It can also help your healthcare provider choose the best treatment for you. The evaluation may include a health history, physical exam, and diagnostic tests.   Health history  Your healthcare provider may ask you the following:     How long has the sore throat lasted and how have you been treating it?    Do you have any other symptoms, such as body aches, fever, or cough?    Does your sore throat recur? If so, how often? How many days of school or work have you missed because of a sore throat?    Do you have trouble eating or swallowing?    Have you been told that you snore or have other sleep problems?    Do you have bad breath?    Do you cough up bad-tasting mucus?  Physical exam  During the exam, your healthcare provider checks your ears, nose, and throat for problems. He or she also checks for swelling in the neck, and may listen to your chest.   Possible tests  Other tests your healthcare provider may perform include:     A throat swab to check for bacteria such as streptococcus (the bacteria that causes strep throat)    A blood test to check for mononucleosis (a viral infection)    A chest X-ray to rule out pneumonia, especially if you have a cough  Treating a sore throat  Treatment depends on many factors. What is the likely cause? Is the problem recent? Does it keep coming back? In many cases, the best thing to do is to treat the symptoms, rest, and let the problem heal itself. Antibiotics may help  clear up some bacterial infections. For cases of severe or recurring tonsillitis, the tonsils may need to be removed.   Relieving your symptoms    Don t smoke, and stay away from secondhand smoke.    For children, try throat sprays or frozen ice pops. Adults and older children may try lozenges.    Drink warm liquids to soothe the throat and help thin mucus. Stay away from alcohol, spicy foods, and acidic drinks such as orange juice. These can irritate the throat.    Gargle with warm saltwater ( 1 teaspoon of salt to  8 ounces of warm water).    Use a humidifier to keep air moist and relieve throat dryness.    Try over-the-counter pain relievers such as acetaminophen or ibuprofen. Use as directed, and don t exceed the recommended dose. Don t give aspirin to children under age19.    Are antibiotics needed?  If your sore throat is due to a bacterial infection, antibiotics may speed healing and prevent complications. Although group A streptococcus (strep throat) is the major treatable infection for a sore throat, strep throat causes only 5% to 15% of sore throats in adults who seek medical care. Most sore throats are caused by cold or flu viruses. And antibiotics don t treat viral illness. In fact, using antibiotics when they re not needed may lead to bacteria that are harder to kill. Your healthcare provider will prescribe antibiotics only if he or she thinks they are likely to help.   If antibiotics are prescribed  Take the medicine exactly as directed. Be sure to finish your prescription even if you re feeling better. Ask your healthcare provider or pharmacist what side effects are common and what to do about them.   Is surgery needed?  In some cases, tonsils need to be removed. This is often done as outpatient (same-day) surgery. Your healthcare provider may advise removing the tonsils in cases of:     Several severe bouts of tonsillitis in a year.  Severe  episodes include those that lead to missed days of school  or work, or that need to be treated with antibiotics.    Tonsillitis that causes breathing problems during sleep    Tonsillitis caused by food particles collecting in pouches in the tonsils (cryptic tonsillitis)  When to call your healthcare provider   Call your healthcare provider immediately if any of the following occur:     Problems swallowing    Symptoms worsen, or new symptoms develop.    Swollen tonsils make breathing difficult.    The pain is severe enough to keep you from drinking liquids.    If a skin rash or hives, develops, call your healthcare provider immediately. Any of these could signal an allergic reaction to antibiotics.    Symptoms don t improve within a week.    Symptoms don t improve within  2 to 3  days of starting antibiotics.  Call 911  Call 911 if any of the following occur:     Trouble breathing or problems catching your breath may be a medical emergency.    Skin is blue, purple or gray in color    Trouble talking    Feeling dizzy or faint    Feeling of doom  [employer] last reviewed this educational content on 7/1/2019 2000-2021 [employer]. All rights reserved. This information is not intended as a substitute for professional medical care. Always follow your healthcare professional's instructions.           Patient Education       Covid-19: Vaccines and Prevention  The best prevention is to have no contact with the SARS-CoV-2 virus, to follow safety precautions, and to get vaccinated. The FDA has approved several vaccines to prevent COVID-19. One vaccine has been approved for people as young as 12. Vaccines are available to prevent COVID-19 and reduce the severity of illness if you get the virus. No vaccine is ever 100% effective in preventing any illness, but the COVID-19 vaccines work well and are safe. Talk with your healthcare provider about your risks and which vaccine may be best for you and your family.  Expert groups, including ACOG and CDC, advise pregnant or  breastfeeding people to be vaccinated.  The vaccines are given as a shot (injection) in the arm muscle. There are 2 COVID-19 vaccine choices:    1-dose vaccine (Dilip & Dilip)    2-dose vaccine (either Pfizer or Moderna). If you get the 2-dose vaccine, the second dose is given several weeks after the first.     Normally healthy people are considered fully vaccinated 2 weeks after getting the 1-dose or the last shot of the 2-dose vaccine.  Who needs a 3-dose primary series?    People with a very weak immune system may not build up enough antibodies to fight COVID-19 after getting the first 2 doses of the 2-dose vaccine. This includes people who have had a solid organ transplant or who have a condition such as cancer or HIV that causes a very weak immune system. For these people, an extra dose of the 2-dose vaccine (Pfizer or Moderna) is advised. It's considered part of their vaccine series (primary series), not a booster. The extra shot is given at least 28 days after the second dose. Talk with your healthcare provider about your case and risk.    Pfizer booster  The FDA has authorized a booster dose of the Pfizer vaccine to be given at least 6 months after the last shot of the primary series for certain high-risk people. CDC states these people should get a Pfizer booster:    People age 65 or older    People living in long-term care facilities    People ages 50 to 64 years with certain health conditions that put them at high risk for severe COVID-19.  Other adults older than 18 may choose to get the booster based on their risk. This includes people with health conditions that put them at high risk for severe COVID-19 or who are at risk for exposure to COVID-19 in their work setting, such as essential workers. Talk with your healthcare provider if you have questions. The FDA is exploring booster doses of the other types of COVID-19 vaccines. No final recommendation has been made.  Take precautions: Travel and  "other outings  Stay informed about COVID-19 in your area. Follow local instructions about being in public. Be aware of events in your community that may be postponed or canceled, such as school and sporting events. You may be advised not to attend public gatherings.   Follow safety precautions in your community. You may be advised to stay at least 6 feet from others as much as possible. This is called \"social distancing.\" You may be advised to wear a mask. You may also be advised to stay at home and isolate yourself as much as possible. You may hear terms such as \"self isolate, \"quarantine,\"  stay at home,  and  shelter in place.   CDC recommends not traveling until you are fully vaccinated against COVID-19. This is because travel raises your chance of getting and spreading the infection. You are considered fully vaccinated 2 weeks after getting either the 1-dose or the last dose of the 2-dose vaccine. Be aware of travel precautions, both within the [place] and abroad. When traveling in the [place] be aware of mask requirements on public transportation such as airplanes, subways, and trains. Here are the most current CDC travel guidelines .    When you are at home    Wash your hands often. Use soap and clean, running water for at least 20 seconds.    If you don't have access to soap and water, use an alcohol-based hand  often. Make sure it has at least 60% alcohol.    Don't touch your eyes, nose, or mouth unless you have clean hands.    Don t kiss someone who is sick.    If you need to cough or sneeze, do it into a tissue. Then throw the tissue into the trash. If you don't have tissues, cough or sneeze into the bend of your elbow.    When possible, don't touch \"high-touch\" shared surfaces such as doorknobs and handles, cabinet handles, and light switches.    Clean frequently-touched home surfaces often with disinfectant. This includes desk surfaces, printers, phones, kitchen counters, tables, fridge door handle, " "bathroom surfaces, and any soiled surface. Closely follow disinfectant label instructions. You can find them at [facility] s detailed cleaning website.    Check your home supplies. Consider keeping a 2-week supply of medicines, food, and other needed household items.    Make a plan for childcare, work, and ways to stay in touch with others. Know who will help you if you get sick.    Don't be around people who are sick.    There is no evidence right now that animals spread SARS-CoV-2. But it's always a good idea to wash your hands after touching any animals. Don't touch animals that may be sick.    Don t share eating or drinking utensils with sick people.    If you leave home      Stay informed about all safety instructions in your area.    Stag at least 6 feet away from all people as advised. This is called \"social distancing.\"    When possible, don't touch \"high-touch\" public surfaces such as doorknobs and handles, cabinet handles, and light switches. If you touch these surfaces, try to clean them first with a disinfecting wipe. Or touch them using a tissue or paper towel.    Use an alcohol-based hand  often. Make sure it has at least 60% alcohol.    Don't touch your eyes, nose, or mouth unless you have clean hands.    If you need to cough or sneeze, do it into a tissue. Then throw the tissue into the trash. If you don't have tissues, cough or sneeze into the bend of your elbow.    As advised, wear a cloth face mask with two or more layers of washable, breathable fabric and a nose wire. Or you can wear a disposable paper mask with a cloth mask over it. Wear the mask so that it covers both your nose and mouth. See CDC's guide to masks.  ? CDC advises all people older than 2 who are not fully vaccinated to wear a mask and stay 6 feet away from others while in public.  ? CDC recommends indoor masking for all teachers, staff, students, and visitors to schools, regardless of vaccination status.  ? CDC advises people " "with weak immune systems, even if fully vaccinated, to continue wearing masks and to stay 6 feet away from others while in public.  ? Like other viruses, the virus that causes COVID-19 changes (mutates) all the time. This leads to several variants that are easily spread, including the delta variant. To protect against variants, CDC advises all people over age 2, including those who are fully vaccinated, to wear a mask indoors in public settings if you are in an area of high numbers of COVID-19 cases. See CDC's county data website for current transmission information in your area.       Regardless of vaccination status, certain people should not wear a face mask. This includes:  ? Children younger than 2 years old  ? Anyone with a health, developmental, or mental health condition that can be made worse by wearing a mask  ? Anyone who is unconscious or unable to remove the face covering without help.     If you are at a work site    If you haven't been fully vaccinated against COVID-19 and are exposed, go home and stay home if you feel sick in any way.    Tell your supervisor if you are well but live with someone who has COVID-19.    Stay at least 6 feet away from all people.    Don't shake hands with anyone.    Don't attend in-person meetings, or limit how many you attend. Meet over phone or video if possible.    Don't use other people's desks, phones, equipment, or offices, if possible.    Wash your hands often. Use soap and clean, running water for at least 20 seconds.    If you don't have access to soap and water, use an alcohol-based hand  often. Make sure it has at least 60% alcohol.    Don't touch your eyes, nose, or mouth unless you have clean hands.    Wear a face mask as advised by your employer, CDC guidance, and your community's instructions.    When possible, don't touch \"high-touch\" public surfaces such as doorknobs and handles, cabinet handles, and light switches. If you touch these surfaces, " clean them first with a disinfecting wipe. Or touch them using a tissue or paper towel.    Use office priyanka one person at a time.    Consider not having office coffee or tea, or group foods.    Don t have meals in groups.    Clean work surfaces often with disinfectant. This includes desk surfaces, photocopier, printer, phones, kitchen counters, fridge door handle, bathroom surfaces, and others.    Don t touch other people s personal work tools, such as phones, keyboards, pens, and other items.    Don t touch other people s eating or drinking utensils.    If you need to cough or sneeze, do it into a tissue. Then throw the tissue into the trash. If you don't have tissues, cough or sneeze into the bend of your elbow.    If you have been exposed to a person with COVID-19  If you've been fully vaccinated against COVID-19, you don't need to stay home if you've been exposed and don't have symptoms. If you have been exposed and have symptoms, stay home and contact your provider about COVID-19 testing.  If you are not fully vaccinated and have been exposed to someone who is suspected of having COVID-19 or has tested positive for it:     Call your healthcare provider and follow all instructions. Stay home away from others and monitor your health. This is called quarantine. CDC advises that you quarantine to help prevent the spread of COVID-19 once you've been exposed to someone with the infection. CDC still supports quarantine for 14 days after exposure, but they recognize the hardship for many who need to work. CDC now recommends two options for how long quarantine should last. If you have been exposed but don't have symptoms, you can stop quarantine:  ? 10 days after exposure if you don't get a diagnostic (viral) test, or  ? 7 days after getting a negative viral test result.    Take your temperature every morning and evening for 14 days after exposure. This is to check for fever. Keep a record of the readings. If  possible, stay away from others, especially those who are at higher risk of getting very sick from COVID-19.    Watch for symptoms of the virus such as cough or trouble breathing. If you develop any symptoms, isolate yourself right away. Call your provider first before going to any clinic or hospital. See  CDC's coronavirus self-.    Your limits are different if you've had COVID-19 in the last 3 months but are fully recovered without symptoms and you have been exposed to someone with COVID-19. If you are symptom-free, you don't need to quarantine or be retested. [facility] doesn't recommend retesting unless you have symptoms of COVID-19 and your new symptoms can't be linked to another illness. Contact your healthcare provider if you have any questions. If you develop symptoms, stay home. If you had COVID-19 more than 3 months ago and have been exposed again, treat it like you've never had COVID-19 and stay home, limit your contact with others, call your provider, and monitor for symptoms.  When to call your healthcare provider  Call your healthcare provider if you think you have COVID-19 symptoms. These can include fever, cough, and trouble breathing. They may also include body aches, headache, chills or repeated shaking with chills, sore throat, loss of taste or smell, or diarrhea. Follow your healthcare provider's specific instructions.  Do I still need a flu shot?  During a pandemic, it's especially important to keep up on recommended vaccines for other illnesses. This is more true if you're at higher risk for severe illness from COVID-19, the flu, or pneumonia. This includes older adults and those who have long-term (chronic) health conditions. Getting a yearly flu vaccine is advised for everyone 6 months old and older, with rare exceptions. Health experts advise the flu vaccine to protect you and others. The flu vaccine helps protect those at high-risk for serious illness, and lowers the strain on hospitals  during the COVID-19 pandemic.  Last modified date: 9/24/2021  [employer] last reviewed this educational content on 4/1/2020 2000-2021 [employer]. All rights reserved. This information is not intended as a substitute for professional medical care. Always follow your healthcare professional's instructions.

## 2021-11-09 NOTE — PATIENT INSTRUCTIONS
Patient Education     When You Have a Sore Throat  A sore throat can be painful. There are many reasons why you may have a sore throat. Your healthcare provider will work with you to find the cause of your sore throat. He or she will also find the best treatment for you.     What causes a sore throat?  Sore throats can be caused or worsened by:     Cold or flu viruses    Bacteria    Irritants such as tobacco smoke or air pollution    Acid reflux  A healthy throat  The tonsils are on the sides of the throat near the base of the tongue. They collect viruses and bacteria and help fight infection. The throat (pharynx) is the passage for air. Mucus from the nasal cavity also moves down the passage.   An inflamed throat  The tonsils and pharynx can become inflamed due to a cold or flu virus. Postnasal drip (excess mucus draining from the nasal cavity) can irritate the throat. It can also make the throat or tonsils more likely to be infected by bacteria. Severe, untreated tonsillitis in children or adults can cause a pocket of pus (abscess) to form near the tonsil.   Your evaluation  A health evaluation can help find the cause of your sore throat. It can also help your healthcare provider choose the best treatment for you. The evaluation may include a health history, physical exam, and diagnostic tests.   Health history  Your healthcare provider may ask you the following:     How long has the sore throat lasted and how have you been treating it?    Do you have any other symptoms, such as body aches, fever, or cough?    Does your sore throat recur? If so, how often? How many days of school or work have you missed because of a sore throat?    Do you have trouble eating or swallowing?    Have you been told that you snore or have other sleep problems?    Do you have bad breath?    Do you cough up bad-tasting mucus?  Physical exam  During the exam, your healthcare provider checks your ears, nose, and throat for problems. He  or she also checks for swelling in the neck, and may listen to your chest.   Possible tests  Other tests your healthcare provider may perform include:     A throat swab to check for bacteria such as streptococcus (the bacteria that causes strep throat)    A blood test to check for mononucleosis (a viral infection)    A chest X-ray to rule out pneumonia, especially if you have a cough  Treating a sore throat  Treatment depends on many factors. What is the likely cause? Is the problem recent? Does it keep coming back? In many cases, the best thing to do is to treat the symptoms, rest, and let the problem heal itself. Antibiotics may help clear up some bacterial infections. For cases of severe or recurring tonsillitis, the tonsils may need to be removed.   Relieving your symptoms    Don t smoke, and stay away from secondhand smoke.    For children, try throat sprays or frozen ice pops. Adults and older children may try lozenges.    Drink warm liquids to soothe the throat and help thin mucus. Stay away from alcohol, spicy foods, and acidic drinks such as orange juice. These can irritate the throat.    Gargle with warm saltwater ( 1 teaspoon of salt to  8 ounces of warm water).    Use a humidifier to keep air moist and relieve throat dryness.    Try over-the-counter pain relievers such as acetaminophen or ibuprofen. Use as directed, and don t exceed the recommended dose. Don t give aspirin to children under age17.    Are antibiotics needed?  If your sore throat is due to a bacterial infection, antibiotics may speed healing and prevent complications. Although group A streptococcus (strep throat) is the major treatable infection for a sore throat, strep throat causes only 5% to 15% of sore throats in adults who seek medical care. Most sore throats are caused by cold or flu viruses. And antibiotics don t treat viral illness. In fact, using antibiotics when they re not needed may lead to bacteria that are harder to kill.  Your healthcare provider will prescribe antibiotics only if he or she thinks they are likely to help.   If antibiotics are prescribed  Take the medicine exactly as directed. Be sure to finish your prescription even if you re feeling better. Ask your healthcare provider or pharmacist what side effects are common and what to do about them.   Is surgery needed?  In some cases, tonsils need to be removed. This is often done as outpatient (same-day) surgery. Your healthcare provider may advise removing the tonsils in cases of:     Several severe bouts of tonsillitis in a year.  Severe  episodes include those that lead to missed days of school or work, or that need to be treated with antibiotics.    Tonsillitis that causes breathing problems during sleep    Tonsillitis caused by food particles collecting in pouches in the tonsils (cryptic tonsillitis)  When to call your healthcare provider   Call your healthcare provider immediately if any of the following occur:     Problems swallowing    Symptoms worsen, or new symptoms develop.    Swollen tonsils make breathing difficult.    The pain is severe enough to keep you from drinking liquids.    If a skin rash or hives, develops, call your healthcare provider immediately. Any of these could signal an allergic reaction to antibiotics.    Symptoms don t improve within a week.    Symptoms don t improve within  2 to 3  days of starting antibiotics.  Call 911  Call 911 if any of the following occur:     Trouble breathing or problems catching your breath may be a medical emergency.    Skin is blue, purple or gray in color    Trouble talking    Feeling dizzy or faint    Feeling of doom  [employer] last reviewed this educational content on 7/1/2019 2000-2021 [employer]. All rights reserved. This information is not intended as a substitute for professional medical care. Always follow your healthcare professional's instructions.           Patient Education        Covid-19: Vaccines and Prevention  The best prevention is to have no contact with the SARS-CoV-2 virus, to follow safety precautions, and to get vaccinated. The FDA has approved several vaccines to prevent COVID-19. One vaccine has been approved for people as young as 12. Vaccines are available to prevent COVID-19 and reduce the severity of illness if you get the virus. No vaccine is ever 100% effective in preventing any illness, but the COVID-19 vaccines work well and are safe. Talk with your healthcare provider about your risks and which vaccine may be best for you and your family.  Expert groups, including ACOG and CDC, advise pregnant or breastfeeding people to be vaccinated.  The vaccines are given as a shot (injection) in the arm muscle. There are 2 COVID-19 vaccine choices:    1-dose vaccine (Dilip & Dilip)    2-dose vaccine (either Pfizer or Moderna). If you get the 2-dose vaccine, the second dose is given several weeks after the first.     Normally healthy people are considered fully vaccinated 2 weeks after getting the 1-dose or the last shot of the 2-dose vaccine.  Who needs a 3-dose primary series?    People with a very weak immune system may not build up enough antibodies to fight COVID-19 after getting the first 2 doses of the 2-dose vaccine. This includes people who have had a solid organ transplant or who have a condition such as cancer or HIV that causes a very weak immune system. For these people, an extra dose of the 2-dose vaccine (Pfizer or Moderna) is advised. It's considered part of their vaccine series (primary series), not a booster. The extra shot is given at least 28 days after the second dose. Talk with your healthcare provider about your case and risk.    Pfizer booster  The FDA has authorized a booster dose of the Pfizer vaccine to be given at least 6 months after the last shot of the primary series for certain high-risk people. CDC states these people should get a Pfizer  "booster:    People age 65 or older    People living in long-term care facilities    People ages 50 to 64 years with certain health conditions that put them at high risk for severe COVID-19.  Other adults older than 18 may choose to get the booster based on their risk. This includes people with health conditions that put them at high risk for severe COVID-19 or who are at risk for exposure to COVID-19 in their work setting, such as essential workers. Talk with your healthcare provider if you have questions. The FDA is exploring booster doses of the other types of COVID-19 vaccines. No final recommendation has been made.  Take precautions: Travel and other outings  Stay informed about COVID-19 in your area. Follow local instructions about being in public. Be aware of events in your community that may be postponed or canceled, such as school and sporting events. You may be advised not to attend public gatherings.   Follow safety precautions in your community. You may be advised to stay at least 6 feet from others as much as possible. This is called \"social distancing.\" You may be advised to wear a mask. You may also be advised to stay at home and isolate yourself as much as possible. You may hear terms such as \"self isolate, \"quarantine,\"  stay at home,  and  shelter in place.   CDC recommends not traveling until you are fully vaccinated against COVID-19. This is because travel raises your chance of getting and spreading the infection. You are considered fully vaccinated 2 weeks after getting either the 1-dose or the last dose of the 2-dose vaccine. Be aware of travel precautions, both within the [place] and abroad. When traveling in the [place] be aware of mask requirements on public transportation such as airplanes, subways, and trains. Here are the most current CDC travel guidelines .    When you are at home    Wash your hands often. Use soap and clean, running water for at least 20 seconds.    If you don't have access " "to soap and water, use an alcohol-based hand  often. Make sure it has at least 60% alcohol.    Don't touch your eyes, nose, or mouth unless you have clean hands.    Don t kiss someone who is sick.    If you need to cough or sneeze, do it into a tissue. Then throw the tissue into the trash. If you don't have tissues, cough or sneeze into the bend of your elbow.    When possible, don't touch \"high-touch\" shared surfaces such as doorknobs and handles, cabinet handles, and light switches.    Clean frequently-touched home surfaces often with disinfectant. This includes desk surfaces, printers, phones, kitchen counters, tables, fridge door handle, bathroom surfaces, and any soiled surface. Closely follow disinfectant label instructions. You can find them at [facility] s detailed cleaning website.    Check your home supplies. Consider keeping a 2-week supply of medicines, food, and other needed household items.    Make a plan for childcare, work, and ways to stay in touch with others. Know who will help you if you get sick.    Don't be around people who are sick.    There is no evidence right now that animals spread SARS-CoV-2. But it's always a good idea to wash your hands after touching any animals. Don't touch animals that may be sick.    Don t share eating or drinking utensils with sick people.    If you leave home      Stay informed about all safety instructions in your area.    Stag at least 6 feet away from all people as advised. This is called \"social distancing.\"    When possible, don't touch \"high-touch\" public surfaces such as doorknobs and handles, cabinet handles, and light switches. If you touch these surfaces, try to clean them first with a disinfecting wipe. Or touch them using a tissue or paper towel.    Use an alcohol-based hand  often. Make sure it has at least 60% alcohol.    Don't touch your eyes, nose, or mouth unless you have clean hands.    If you need to cough or sneeze, do it into a " tissue. Then throw the tissue into the trash. If you don't have tissues, cough or sneeze into the bend of your elbow.    As advised, wear a cloth face mask with two or more layers of washable, breathable fabric and a nose wire. Or you can wear a disposable paper mask with a cloth mask over it. Wear the mask so that it covers both your nose and mouth. See CDC's guide to masks.  ? CDC advises all people older than 2 who are not fully vaccinated to wear a mask and stay 6 feet away from others while in public.  ? CDC recommends indoor masking for all teachers, staff, students, and visitors to schools, regardless of vaccination status.  ? CDC advises people with weak immune systems, even if fully vaccinated, to continue wearing masks and to stay 6 feet away from others while in public.  ? Like other viruses, the virus that causes COVID-19 changes (mutates) all the time. This leads to several variants that are easily spread, including the delta variant. To protect against variants, CDC advises all people over age 2, including those who are fully vaccinated, to wear a mask indoors in public settings if you are in an area of high numbers of COVID-19 cases. See [facility]'s county data website for current transmission information in your area.       Regardless of vaccination status, certain people should not wear a face mask. This includes:  ? Children younger than 2 years old  ? Anyone with a health, developmental, or mental health condition that can be made worse by wearing a mask  ? Anyone who is unconscious or unable to remove the face covering without help.     If you are at a work site    If you haven't been fully vaccinated against COVID-19 and are exposed, go home and stay home if you feel sick in any way.    Tell your supervisor if you are well but live with someone who has COVID-19.    Stay at least 6 feet away from all people.    Don't shake hands with anyone.    Don't attend in-person meetings, or limit how many you  "attend. Meet over phone or video if possible.    Don't use other people's desks, phones, equipment, or offices, if possible.    Wash your hands often. Use soap and clean, running water for at least 20 seconds.    If you don't have access to soap and water, use an alcohol-based hand  often. Make sure it has at least 60% alcohol.    Don't touch your eyes, nose, or mouth unless you have clean hands.    Wear a face mask as advised by your employer, CDC guidance, and your community's instructions.    When possible, don't touch \"high-touch\" public surfaces such as doorknobs and handles, cabinet handles, and light switches. If you touch these surfaces, clean them first with a disinfecting wipe. Or touch them using a tissue or paper towel.    Use office priyanka one person at a time.    Consider not having office coffee or tea, or group foods.    Don t have meals in groups.    Clean work surfaces often with disinfectant. This includes desk surfaces, photocopier, printer, phones, kitchen counters, fridge door handle, bathroom surfaces, and others.    Don t touch other people s personal work tools, such as phones, keyboards, pens, and other items.    Don t touch other people s eating or drinking utensils.    If you need to cough or sneeze, do it into a tissue. Then throw the tissue into the trash. If you don't have tissues, cough or sneeze into the bend of your elbow.    If you have been exposed to a person with COVID-19  If you've been fully vaccinated against COVID-19, you don't need to stay home if you've been exposed and don't have symptoms. If you have been exposed and have symptoms, stay home and contact your provider about COVID-19 testing.  If you are not fully vaccinated and have been exposed to someone who is suspected of having COVID-19 or has tested positive for it:     Call your healthcare provider and follow all instructions. Stay home away from others and monitor your health. This is called quarantine. " CDC advises that you quarantine to help prevent the spread of COVID-19 once you've been exposed to someone with the infection. CDC still supports quarantine for 14 days after exposure, but they recognize the hardship for many who need to work. CDC now recommends two options for how long quarantine should last. If you have been exposed but don't have symptoms, you can stop quarantine:  ? 10 days after exposure if you don't get a diagnostic (viral) test, or  ? 7 days after getting a negative viral test result.    Take your temperature every morning and evening for 14 days after exposure. This is to check for fever. Keep a record of the readings. If possible, stay away from others, especially those who are at higher risk of getting very sick from COVID-19.    Watch for symptoms of the virus such as cough or trouble breathing. If you develop any symptoms, isolate yourself right away. Call your provider first before going to any clinic or hospital. See  CDC's coronavirus self-.    Your limits are different if you've had COVID-19 in the last 3 months but are fully recovered without symptoms and you have been exposed to someone with COVID-19. If you are symptom-free, you don't need to quarantine or be retested. CDC doesn't recommend retesting unless you have symptoms of COVID-19 and your new symptoms can't be linked to another illness. Contact your healthcare provider if you have any questions. If you develop symptoms, stay home. If you had COVID-19 more than 3 months ago and have been exposed again, treat it like you've never had COVID-19 and stay home, limit your contact with others, call your provider, and monitor for symptoms.  When to call your healthcare provider  Call your healthcare provider if you think you have COVID-19 symptoms. These can include fever, cough, and trouble breathing. They may also include body aches, headache, chills or repeated shaking with chills, sore throat, loss of taste or smell, or  diarrhea. Follow your healthcare provider's specific instructions.  Do I still need a flu shot?  During a pandemic, it's especially important to keep up on recommended vaccines for other illnesses. This is more true if you're at higher risk for severe illness from COVID-19, the flu, or pneumonia. This includes older adults and those who have long-term (chronic) health conditions. Getting a yearly flu vaccine is advised for everyone 6 months old and older, with rare exceptions. Health experts advise the flu vaccine to protect you and others. The flu vaccine helps protect those at high-risk for serious illness, and lowers the strain on hospitals during the COVID-19 pandemic.  Last modified date: 9/24/2021  [employer] last reviewed this educational content on 4/1/2020 2000-2021 [employer]. All rights reserved. This information is not intended as a substitute for professional medical care. Always follow your healthcare professional's instructions.

## 2021-11-10 LAB — GROUP A STREP BY PCR: NOT DETECTED

## 2021-11-11 LAB — SARS-COV-2 RNA RESP QL NAA+PROBE: NOT DETECTED

## 2021-11-20 ENCOUNTER — HEALTH MAINTENANCE LETTER (OUTPATIENT)
Age: 51
End: 2021-11-20

## 2021-12-06 ENCOUNTER — ANCILLARY PROCEDURE (OUTPATIENT)
Dept: GENERAL RADIOLOGY | Facility: CLINIC | Age: 51
End: 2021-12-06
Attending: NURSE PRACTITIONER
Payer: COMMERCIAL

## 2021-12-06 ENCOUNTER — OFFICE VISIT (OUTPATIENT)
Dept: FAMILY MEDICINE | Facility: CLINIC | Age: 51
End: 2021-12-06
Payer: COMMERCIAL

## 2021-12-06 VITALS
WEIGHT: 228 LBS | DIASTOLIC BLOOD PRESSURE: 71 MMHG | OXYGEN SATURATION: 98 % | HEIGHT: 64 IN | BODY MASS INDEX: 38.93 KG/M2 | SYSTOLIC BLOOD PRESSURE: 128 MMHG | RESPIRATION RATE: 18 BRPM | TEMPERATURE: 99.6 F | HEART RATE: 75 BPM

## 2021-12-06 DIAGNOSIS — M25.552 HIP PAIN, LEFT: ICD-10-CM

## 2021-12-06 DIAGNOSIS — E66.01 MORBID OBESITY (H): ICD-10-CM

## 2021-12-06 DIAGNOSIS — Z63.8 CAREGIVER ROLE STRAIN: ICD-10-CM

## 2021-12-06 DIAGNOSIS — M54.42 ACUTE BILATERAL LOW BACK PAIN WITH LEFT-SIDED SCIATICA: Primary | ICD-10-CM

## 2021-12-06 PROCEDURE — 99214 OFFICE O/P EST MOD 30 MIN: CPT | Performed by: NURSE PRACTITIONER

## 2021-12-06 PROCEDURE — 73502 X-RAY EXAM HIP UNI 2-3 VIEWS: CPT | Performed by: RADIOLOGY

## 2021-12-06 RX ORDER — METHOCARBAMOL 500 MG/1
500-1000 TABLET, FILM COATED ORAL
Qty: 60 TABLET | Refills: 1 | Status: SHIPPED | OUTPATIENT
Start: 2021-12-06 | End: 2023-07-18

## 2021-12-06 RX ORDER — GABAPENTIN 300 MG/1
300 CAPSULE ORAL 3 TIMES DAILY
Qty: 90 CAPSULE | Refills: 1 | Status: SHIPPED | OUTPATIENT
Start: 2021-12-06 | End: 2022-02-28

## 2021-12-06 RX ORDER — METHYLPREDNISOLONE 4 MG
TABLET, DOSE PACK ORAL
Qty: 21 TABLET | Refills: 0 | Status: SHIPPED | OUTPATIENT
Start: 2021-12-06 | End: 2021-12-28

## 2021-12-06 SDOH — SOCIAL STABILITY - SOCIAL INSECURITY: OTHER SPECIFIED PROBLEMS RELATED TO PRIMARY SUPPORT GROUP: Z63.8

## 2021-12-06 ASSESSMENT — PAIN SCALES - GENERAL: PAINLEVEL: EXTREME PAIN (8)

## 2021-12-06 ASSESSMENT — MIFFLIN-ST. JEOR: SCORE: 1626.26

## 2021-12-06 NOTE — PROGRESS NOTES
"  Assessment & Plan     Acute bilateral low back pain with left-sided sciatica  Plan for imaging given progression of symptoms. Medications as below as well.   - Spine Referral; Future  - methocarbamol (ROBAXIN) 500 MG tablet; Take 1-2 tablets (500-1,000 mg) by mouth nightly as needed for muscle spasms  - gabapentin (NEURONTIN) 300 MG capsule; Take 1 capsule (300 mg) by mouth 3 times daily  - MR Lumbar Spine w/o Contrast; Future  - methylPREDNISolone (MEDROL DOSEPAK) 4 MG tablet therapy pack; Follow Package Directions    Hip pain, left  Start with xray.    - XR Hip Left 2-3 Views; Future    Caregiver role strain  Patient's father with dementia is living with her and her family.  Very stressful.  Patient not taking much time for herself.  Discussed this and counseled.  No need for CC referral at this time.  She will reach out if needed.     Morbid obesity (H)        Prescription drug management  25 minutes spent on the date of the encounter doing chart review, history and exam, documentation and further activities per the note       BMI:   Estimated body mass index is 39.75 kg/m  as calculated from the following:    Height as of this encounter: 1.613 m (5' 3.5\").    Weight as of this encounter: 103.4 kg (228 lb).   Weight management plan: Discussed healthy diet and exercise guidelines    Patient Instructions   For the back:  Take robaxin/methocarbamol (muscle relaxer) at night for next week for muscle spasm.  Do not drive while taking, take when you have 6-7 hours prior to waking as will cause drowsiness  Take medrol dose rosa as prescribed  Gabapentin three times a day for nerve pain              Return in about 4 weeks (around 1/3/2022), or if symptoms worsen or fail to improve.    Alba Lim, QUANG CNP  [facility]    Jayne Horner is a 51 year old who presents for the following health issues  HPI   Weakness and numbness of left leg.  Saw chiro.  Started during COVID with " "woking at a high top table.    Worsening now with left hip pain      Review of Systems   Constitutional, HEENT, cardiovascular, pulmonary, GI, , musculoskeletal, neuro, skin, endocrine and psych systems are negative, except as otherwise noted.      Objective    /71   Pulse 75   Temp 99.6  F (37.6  C) (Tympanic)   Resp 18   Ht 1.613 m (5' 3.5\")   Wt 103.4 kg (228 lb)   SpO2 98%   BMI 39.75 kg/m    Body mass index is 39.75 kg/m .  Physical Exam   GENERAL: healthy, alert and no distress  MS: right hip with pain with DELPHINE and FADIRs  NEURO: Normal strength and tone, mentation intact and speech normal  Comprehensive back pain exam:  Tenderness of lumbar paraspinals, Pain limits the following motions: all planes, Lower extremity strength functional and equal on both sides, Lower extremity reflexes within normal limits bilaterally, Lower extremity sensation normal and equal on both sides and Straight leg raise negative bilaterally    XR Hip Left 2-3 Views    Result Date: 12/7/2021  LEFT LEFT TWO TO THREE VIEWS   12/6/2021 at 1550 hours HISTORY: Hip pain, left. COMPARISON: None available.     IMPRESSION:  Anatomic alignment left hip. No acute displaced left hip fracture. Mild left hip osteoarthritis. 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 Symmetric SI joints. TUCKER ALATORRE MD   SYSTEM ID:  [id number]            "

## 2021-12-06 NOTE — PATIENT INSTRUCTIONS
For the back:  Take robaxin/methocarbamol (muscle relaxer) at night for next week for muscle spasm.  Do not drive while taking, take when you have 6-7 hours prior to waking as will cause drowsiness  Take medrol dose rosa as prescribed  Gabapentin three times a day for nerve pain

## 2021-12-10 PROBLEM — Z63.8 CAREGIVER ROLE STRAIN: Status: ACTIVE | Noted: 2021-12-10

## 2021-12-21 ENCOUNTER — ANCILLARY PROCEDURE (OUTPATIENT)
Dept: MRI IMAGING | Facility: CLINIC | Age: 51
End: 2021-12-21
Attending: NURSE PRACTITIONER
Payer: COMMERCIAL

## 2021-12-21 DIAGNOSIS — M54.42 ACUTE BILATERAL LOW BACK PAIN WITH LEFT-SIDED SCIATICA: ICD-10-CM

## 2021-12-21 PROCEDURE — 72148 MRI LUMBAR SPINE W/O DYE: CPT | Mod: GC | Performed by: RADIOLOGY

## 2021-12-28 ENCOUNTER — OFFICE VISIT (OUTPATIENT)
Dept: NEUROSURGERY | Facility: CLINIC | Age: 51
End: 2021-12-28
Payer: COMMERCIAL

## 2021-12-28 VITALS
HEART RATE: 84 BPM | DIASTOLIC BLOOD PRESSURE: 74 MMHG | SYSTOLIC BLOOD PRESSURE: 109 MMHG | BODY MASS INDEX: 38.41 KG/M2 | HEIGHT: 64 IN | WEIGHT: 225 LBS

## 2021-12-28 DIAGNOSIS — M99.04 SACROILIAC JOINT SOMATIC DYSFUNCTION: Primary | ICD-10-CM

## 2021-12-28 DIAGNOSIS — M79.18 MYOFASCIAL PAIN: ICD-10-CM

## 2021-12-28 PROCEDURE — 98925 OSTEOPATH MANJ 1-2 REGIONS: CPT | Performed by: PREVENTIVE MEDICINE

## 2021-12-28 PROCEDURE — 99205 OFFICE O/P NEW HI 60 MIN: CPT | Mod: 25 | Performed by: PREVENTIVE MEDICINE

## 2021-12-28 ASSESSMENT — PAIN SCALES - GENERAL: PAINLEVEL: SEVERE PAIN (7)

## 2021-12-28 ASSESSMENT — MIFFLIN-ST. JEOR: SCORE: 1612.65

## 2021-12-28 NOTE — PATIENT INSTRUCTIONS
"It is nice to meet you today Siri.  You have a Pelvic Joint Dysfunction manifesting as a left anterior innominate rotation which we corrected today with good results making me quite confident that this is your problem plus a trigger point in your gluteal muscles.  In my judgment you do not have any evidence of sciatic and we do not need to worry about injections or surgery but I want you to go to my therapist for a stabilization and strengthening program.  I am looking forward to seeing you back in 2 months and hopefully you will be feeling much better.  See the assessment and plan below for further details and also the exercises below        PELVIC JOINT SELF CORRECTION EXERCISES      It is best to do this first thing in the morning, and can be repeated once or twice during the day.    \"SHOTGUN\" TECHNIQUE:  This loosens up the front and back of the pelvis.  Do this before the Broomstick exercise.  Use on object such as a rectangular laundry basket.  Lie on your back with your knees bent, feet together and flat on the floor.    Spread your knees approximately 12-24 inches around the outside of an upright laundry basket.    Squeeze your knees together, breathing as you do this.      Concentrate on keeping your buttocks relaxed and on the ground.      A brief discomfort in the front of the pelvis and even a popping sound is normal.    Hold the squeeze for 3-5 seconds.      Relax for 3-5 seconds.      Repeat 2 more times.    Now reverse your knee position to the inside of the upside down laundry basket while still lying with your knees bent up, feet on the ground.    Pull your knees apart, breathing easy as you do this.    Hold the squeeze for 3-5 seconds.      Relax for 3-5 seconds.      Repeat 2 more times.    BROOMSTICK EXERCISE:  Lie on your back with your knees bent.  Slide a broomstick or similar object above one knee, and below the opposite knee.  Firmly hold the stick with your hands will bringing your knees " closed to your belly, preferably high enough that your back can rest flat on the ground.  Still holding the stick, scissors your legs against the stick, breathing as you do this.    (Push down to your foot with leg on top of the broomstick, and lift up to your chin with the leg below the broomstick).    Hold the squeeze for 3-5 seconds.      Relax for 3-5 seconds.      Repeat 2 more times.    Switch the position of the broomstick above the other knee, and below the first knee.    Repeat the exercise as above in this new position.        ASSESSMENT: Siri Vyas is a 51 year old female who presents  today for new patient evaluation of: Pelvic Joint Dysfunction manifesting as a left anterior innominate rotation.  Significant left gluteal myofascial pain radiating to the left leg.  No evidence of sciatica and normal MRI      PLAN:  Pelvic stabilization therapy and gluteal myofascial protocols with Reynaldo Navarro at [facility].  I taught her how to do the shotgun and broomstick technique which I like her to do every morning until she starts working with Olvin and he will teach her the gluteal myofascial protocols.  Follow-up with me in 2 months

## 2021-12-28 NOTE — NURSING NOTE
"Reason For Visit:   Chief Complaint   Patient presents with     Consult     Low back pain         Occupation: computer work  Currently working? Yes.  Work status?  Full time.    Sports: n  Activities: walking             /74   Pulse 84   Ht 1.613 m (5' 3.5\")   Wt 102.1 kg (225 lb)   BMI 39.23 kg/m        Allergies   Allergen Reactions     Dust Mites      No Clinical Screening - See Comments Other (See Comments)     No Known Drug Allergy      Seasonal Allergies        Current Outpatient Medications   Medication     acetaminophen (TYLENOL) 325 MG tablet     aspirin 325 MG EC tablet     Cholecalciferol (VITAMIN D3) 25 MCG (1000 UT) CAPS     fexofenadine (ALLEGRA) 180 MG tablet     gabapentin (NEURONTIN) 300 MG capsule     GLUCOSAMINE-CHONDROITIN PO     methocarbamol (ROBAXIN) 500 MG tablet     Misc Natural Products (OSTEO BI-FLEX JOINT SHIELD PO)     Multiple Vitamin (MULTIVITAMINS PO)     Omega-3 Fatty Acids (OMEGA-3 FISH OIL PO)     Current Facility-Administered Medications   Medication     betamethasone acet & sod phos (CELESTONE) injection 6 mg     betamethasone acet & sod phos (CELESTONE) injection 6 mg     lidocaine 1 % injection 4 mL     lidocaine 1 % injection 4 mL     lidocaine 1 % injection 5 mL     lidocaine 1 % injection 5 mL     methylPREDNISolone (DEPO-MEDROL) injection 80 mg     methylPREDNISolone (DEPO-MEDROL) injection 80 mg         Darla Severin-Brown, LPN  "

## 2021-12-28 NOTE — LETTER
"    12/28/2021         RE: Siri Vyas  [address]  [place] [facility] [address]        Dear Colleague,    Thank you for referring your patient, Siri Vyas, to the [facility] [place]. Please see a copy of my visit note below.        SUBJECTIVE:  HPI:  Siri Vyas  Is a 51 year old female who presents today for new patient evaluation of low back and left hip pain upon referral from nurse practitioner Alba Lim.  Her left hip x-ray shows minimal DJD and no other abnormalities.  Her lumbar MRI is compared to a prior study from 2009 but the radiologist did not specifically indicate what is different.,  And no high-grade stenosis to explain left-sided symptoms is seen.  Minimal spondylosis for age.    Onset was about a year ago without a specific injury but she was working from home during the pandemic and sitting at a high top table on a high top stool with her legs often dangling.  That seemed to be what started all this and eventually she got her office chair with more proper ergonomics but it did not help her symptoms.  Chiropractic care has not really helped.  She is experiencing pain in the left SI area and a \"like a pinched nerve\" sensation in her left inguinal area the latter which is intermittent with the former is constant.  She also has intermittent tingling down the back of her left leg but that is not true numbness and not true weakness.  No red flags on review of systems    SYMPTOMS WORSENED WITH putting on her left shoes and socks in a figure-of-four position, sometimes sitting, walking    SYMPTOMS IMPROVED WITH nothing    Pain score and diagram reviewed.  See questionnaire.      ROS:  Specifically negative for bowel/bladder dysfunction, balance changes, headache, leg pain/numbness/weakness, fevers, chills, night sweats, unexplained weight loss;  otherwise unremarkable.   See the patient's intake questionnaire from today for details.    Treatment to Date: " Methylprednisolone, gabapentin, Robaxin, chiropractic care    MEDICATIONS:  Reviewed.    ALLERGIES:  Reviewed.     Reviewed past medical, surgical, and family history.    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  She is  with 4 children.  Her father lives with her.  Prior to the onset of the current condition, she was jogging and walking and was doing some 5K runs, playing basketball with her children ages 17-24.  She played midfield in soccer and also basketball in high school and she weighed 100 pounds less in those days.      OBJECTIVE:    --CONSTITUTIONAL:   No acute distress.  The patient is well nourished and well groomed.  Slightly slow transition in initial limp on the left leg which resolved.  BMI increased.  --PSYCHIATRIC:  Appropriate mood and affect. The patient is awake, alert, oriented to person, place, time and answering questions appropriately with clear speech.    --SKIN:  Skin over the face, bilateral lower extremities, and posterior torso is clean, dry, intact without rashes.    --RESPIRATORY: Normal respiratory excursion and effort, and no dyspnea.   --GAIT:  is slightly left antalgic. Flat foot, heel and toe walking:  normal   .  Squat and rise   normal    .  --STANDING EXAMINATION:    Symmetry of spine/pelvis   unremarkable   .      Range of motion full with slight pain on flexion and on right side bending but dramatic pain on extension.   Standing flexion   positive left.    Leeanne's sign     slightly positive left.     Stork test   negative    .   --NEUROLOGICAL:     ROMBERG, TANDEM WALK, PRONATOR DRIFT:   Normal.   .  SENSATION to light touch is intact in bilateral thighs, lower legs and feet.   REFLEXES:  patellar 2+, and achilles 1+.  Babinski is negative. No clonus.  MANUAL MOTOR TESTING:  L3- S1 Myotomes, Femoral, Obturator, Peroneal and Tibial nerves 5/5  "  DURAL STRETCH TESTS:  SLR negative bilaterally with slump testing other than symmetric pulling in the backs of her legs.  Femoral Stretch Test not done.   --PELVIC/HIP JOINTS:                Long Sitting      long too short left.    Hip scour    positive left.    Hip Impingement    positive left.   DELPHINE      positive left.     Piriformis      positive left.   Spring testing decreased compliance left SI joint with mild tenderness.  None over the right SI joint, or lumbar.      PELVIC ALIGNMENT left anterior innominate rotation.   --LUMBAR/GLUTEAL MUSCLES: Active trigger point left gluteus medius reproducing left leg symptoms.    --ABDOMINAL:  Non-distended.  Nontender  --VASCULAR: Femoral pulses nonpalpable. Lower extremity capillary refill, temperature and color normal.       Procedure note-OMT:  Manual medicine restore normal alignment.  Leg lengths were even with a negative long sitting test and all of the left hip provocative test improved dramatically.  She felt better sitting.  Lumbar range of motion was normal and was \"way better\" with no pain on extension.  She could put her left shoe on for the first time in a year without much pain.      IMAGING: Images and report reviewed    MR LUMBAR SPINE W/O CONTRAST 12/21/2021    Comparison: MRI 11/11/2009    L4-5: Circumferential disc bulge. Bilateral facet arthropathy.  Ligamentum flavum thickening. Minimal narrowing of the right lateral  recess. Mild bilateral neuroforaminal stenosis. No spinal canal  stenosis. Progressed.      L5-S1: Disc bulge. Left facet arthropathy. Mild left neural foraminal  narrowing. No spinal canal stenosis. Progressed.                                                              Impression: Multilevel lumbar spondylosis, most pronounced at L4-5  with mild neural foraminal stenosis bilaterally. Additionally, there  is mild left neural foraminal stenosis at L5-S1. No evidence for  high-grade spinal canal or neural foraminal stenosis.     " LEFT HIP TWO TO THREE VIEWS   12/6/2021                                                                   IMPRESSION:  Anatomic alignment left hip. No acute displaced left hip  fracture. Mild left hip osteoarthritis. 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 Bilateral tubal occluder devices project over the  superior pelvis. Symmetric SI joints.    ASSESSMENT: Siri Vyas is a 51 year old female who presents  today for new patient evaluation of: Pelvic Joint Dysfunction manifesting as a left anterior innominate rotation.  Significant left gluteal myofascial pain radiating to the left leg.  No evidence of sciatica and normal MRI      PLAN:  Pelvic stabilization therapy and gluteal myofascial protocols with Reynaldo Navarro at [facility].  I taught her how to do the shotgun and broomstick technique which I like her to do every morning until she starts working with Olvin and he will teach her the gluteal myofascial protocols.  Follow-up with me in 2 months    I would like to thank nurse practitioner Alba Lim for the opportunity to participate in care of this very pleasant patient.    Advised patient to call or return early if symptoms worsen, or having problems controlling bladder and bowel function or worsening leg weakness.     Please note: Voice recognition software was used in this dictation.  It may therefore contain typographical errors.    Nick Sterling MD             Again, thank you for allowing me to participate in the care of your patient.        Sincerely,        Nick Sterling MD

## 2021-12-28 NOTE — PROGRESS NOTES
"    SUBJECTIVE:  HPI:  Siri Vyas  Is a 51 year old female who presents today for new patient evaluation of low back and left hip pain upon referral from nurse practitioner Alba Lim.  Her left hip x-ray shows minimal DJD and no other abnormalities.  Her lumbar MRI is compared to a prior study from 2009 but the radiologist did not specifically indicate what is different.,  And no high-grade stenosis to explain left-sided symptoms is seen.  Minimal spondylosis for age.    Onset was about a year ago without a specific injury but she was working from home during the pandemic and sitting at a high top table on a high top stool with her legs often dangling.  That seemed to be what started all this and eventually she got her office chair with more proper ergonomics but it did not help her symptoms.  Chiropractic care has not really helped.  She is experiencing pain in the left SI area and a \"like a pinched nerve\" sensation in her left inguinal area the latter which is intermittent with the former is constant.  She also has intermittent tingling down the back of her left leg but that is not true numbness and not true weakness.  No red flags on review of systems    SYMPTOMS WORSENED WITH putting on her left shoes and socks in a figure-of-four position, sometimes sitting, walking    SYMPTOMS IMPROVED WITH nothing    Pain score and diagram reviewed.  See questionnaire.      ROS:  Specifically negative for bowel/bladder dysfunction, balance changes, headache, leg pain/numbness/weakness, fevers, chills, night sweats, unexplained weight loss;  otherwise unremarkable.   See the patient's intake questionnaire from today for details.    Treatment to Date: Methylprednisolone, gabapentin, Robaxin, chiropractic care    MEDICATIONS:  Reviewed.    ALLERGIES:  Reviewed.     Reviewed past medical, surgical, and family history.    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  She is  with 4 children.  Her father lives with her.  Prior to the onset of the current condition, she was jogging and walking and was doing some 5K runs, playing basketball with her children ages 17-24.  She played midfield in soccer and also basketball in high school and she weighed 100 pounds less in those days.      OBJECTIVE:    --CONSTITUTIONAL:   No acute distress.  The patient is well nourished and well groomed.  Slightly slow transition in initial limp on the left leg which resolved.  BMI increased.  --PSYCHIATRIC:  Appropriate mood and affect. The patient is awake, alert, oriented to person, place, time and answering questions appropriately with clear speech.    --SKIN:  Skin over the face, bilateral lower extremities, and posterior torso is clean, dry, intact without rashes.    --RESPIRATORY: Normal respiratory excursion and effort, and no dyspnea.   --GAIT:  is slightly left antalgic. Flat foot, heel and toe walking:  normal   .  Squat and rise   normal    .  --STANDING EXAMINATION:    Symmetry of spine/pelvis   unremarkable   .      Range of motion full with slight pain on flexion and on right side bending but dramatic pain on extension.   Standing flexion   positive left.    Leeanne's sign     slightly positive left.     Stork test   negative    .   --NEUROLOGICAL:     ROMBERG, TANDEM WALK, PRONATOR DRIFT:   Normal.   .  SENSATION to light touch is intact in bilateral thighs, lower legs and feet.   REFLEXES:  patellar 2+, and achilles 1+.  Babinski is negative. No clonus.  MANUAL MOTOR TESTING:  L3- S1 Myotomes, Femoral, Obturator, Peroneal and Tibial nerves 5/5   DURAL STRETCH TESTS:  SLR negative bilaterally with slump testing other than symmetric pulling in the backs of her legs.  Femoral Stretch Test not done.   --PELVIC/HIP JOINTS:                Long Sitting      long too short left.    Hip scour    positive left.    Hip  "Impingement    positive left.   DELPHINE      positive left.     Piriformis      positive left.   Spring testing decreased compliance left SI joint with mild tenderness.  None over the right SI joint, or lumbar.      PELVIC ALIGNMENT left anterior innominate rotation.   --LUMBAR/GLUTEAL MUSCLES: Active trigger point left gluteus medius reproducing left leg symptoms.    --ABDOMINAL:  Non-distended.  Nontender  --VASCULAR: Femoral pulses nonpalpable. Lower extremity capillary refill, temperature and color normal.       Procedure note-OMT:  Manual medicine restore normal alignment.  Leg lengths were even with a negative long sitting test and all of the left hip provocative test improved dramatically.  She felt better sitting.  Lumbar range of motion was normal and was \"way better\" with no pain on extension.  She could put her left shoe on for the first time in a year without much pain.      IMAGING: Images and report reviewed    MR LUMBAR SPINE W/O CONTRAST 12/21/2021    Comparison: MRI 11/11/2009    L4-5: Circumferential disc bulge. Bilateral facet arthropathy.  Ligamentum flavum thickening. Minimal narrowing of the right lateral  recess. Mild bilateral neuroforaminal stenosis. No spinal canal  stenosis. Progressed.      L5-S1: Disc bulge. Left facet arthropathy. Mild left neural foraminal  narrowing. No spinal canal stenosis. Progressed.                                                              Impression: Multilevel lumbar spondylosis, most pronounced at L4-5  with mild neural foraminal stenosis bilaterally. Additionally, there  is mild left neural foraminal stenosis at L5-S1. No evidence for  high-grade spinal canal or neural foraminal stenosis.     LEFT HIP TWO TO THREE VIEWS   12/6/2021                                                                   IMPRESSION:  Anatomic alignment left hip. No acute displaced left hip  fracture. Mild left hip osteoarthritis. 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 " Bilateral tubal occluder devices project over the  superior pelvis. Symmetric SI joints.    ASSESSMENT: Siri Vyas is a 51 year old female who presents  today for new patient evaluation of: Pelvic Joint Dysfunction manifesting as a left anterior innominate rotation.  Significant left gluteal myofascial pain radiating to the left leg.  No evidence of sciatica and normal MRI      PLAN:  Pelvic stabilization therapy and gluteal myofascial protocols with Reynaldo Navarro at [facility].  I taught her how to do the shotgun and broomstick technique which I like her to do every morning until she starts working with Olvin and he will teach her the gluteal myofascial protocols.  Follow-up with me in 2 months    I would like to thank nurse practitioner Alba Lim for the opportunity to participate in care of this very pleasant patient.    Advised patient to call or return early if symptoms worsen, or having problems controlling bladder and bowel function or worsening leg weakness.     Please note: Voice recognition software was used in this dictation.  It may therefore contain typographical errors.    Nick Sterling MD

## 2021-12-29 NOTE — NURSING NOTE
Referral, demographics and OV note faxed to [facility] [phone] along with cover sheet requesting they call pt and schedule with Olvin Godfrey.    Roro Dumont LPN

## 2022-01-27 ENCOUNTER — IMMUNIZATION (OUTPATIENT)
Dept: NURSING | Facility: CLINIC | Age: 52
End: 2022-01-27
Payer: COMMERCIAL

## 2022-01-27 PROCEDURE — 0054A COVID-19,PF,PFIZER (12+ YRS): CPT

## 2022-01-27 PROCEDURE — 91305 COVID-19,PF,PFIZER (12+ YRS): CPT

## 2022-02-23 NOTE — PROGRESS NOTES
"  Subjective:  Siri Vyas is a 51 year old female who presents today for follow-up regarding Pelvic Joint Dysfunction manifesting on 12/28/2021 as a left anterior innominate rotation, and also significant left gluteal myofascial pain radiating into the left leg.  I sent her for pelvic stabilization PT with Reynaldo Navarro    PRIOR HISTORY from 12/20/2021:  Her left hip x-ray shows minimal DJD and no other abnormalities.  Her lumbar MRI is compared to a prior study from 2009 but the radiologist did not specifically indicate what is different.,  And no high-grade stenosis to explain left-sided symptoms is seen.  Minimal spondylosis for age.     Onset was about a year ago without a specific injury but she was working from home during the pandemic and sitting at a high top table on a high top stool with her legs often dangling.  That seemed to be what started all this and eventually she got her office chair with more proper ergonomics but it did not help her symptoms.  Chiropractic care has not really helped.  She is experiencing pain in the left SI area and a \"like a pinched nerve\" sensation in her left inguinal area the latter which is intermittent with the former is constant.  She also has intermittent tingling down the back of her left leg but that is not true numbness and not true weakness.  No red flags on review of systems    Treatment to Date: Methylprednisolone, gabapentin, Robaxin, chiropractic care, PT, OMT.    INTERIM HISTORY:    Previously for about a year her pain was at a constant 8-9/10.  Today it is a 3-4 and at worst it goes up to a 6 and she is quite pleased with her progress.  The quality the pain is less sharp and her function has improved dramatically.  Her physical therapist notes attainment of pelvic stability and she has been taught a good home program as well as proper lifting techniques.  We talked about what to expect in the future in what symptom changes would be expected if a Pelvic " Joint Dysfunction recurs.  She still has some difficulty tolerating a left Tony's position    Past medical history is reviewed and is unchanged for any new medical diagnoses in the interim.    Pertinent for obesity, status post left leg thrombophlebitis    Social history is reviewed and is unchanged in the interim.  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  She is  with 4 children.  Her father lives with her.  Prior to the onset of the current condition, she was jogging and walking and was doing some 5K runs, playing basketball with her children ages 17-24.  She played midfield in soccer and also basketball in high school and she weighed 100 pounds less in those days.    Review of Systems:   Pertinent negatives include no fevers, chills, unexplained weight loss, bowel incontinence, bladder incontinence, trips, stumbles, falls.  All others reviewed and are negative. See patient's intake questionnaire from today for pain diagram, and further details.    IMAGING: Images and report reviewed     MR LUMBAR SPINE W/O CONTRAST 12/21/2021     Comparison: MRI 11/11/2009     L4-5: Circumferential disc bulge. Bilateral facet arthropathy.  Ligamentum flavum thickening. Minimal narrowing of the right lateral  recess. Mild bilateral neuroforaminal stenosis. No spinal canal  stenosis. Progressed.      L5-S1: Disc bulge. Left facet arthropathy. Mild left neural foraminal  narrowing. No spinal canal stenosis. Progressed.                                                              Impression: Multilevel lumbar spondylosis, most pronounced at L4-5  with mild neural foraminal stenosis bilaterally. Additionally, there  is mild left neural foraminal stenosis at L5-S1. No evidence for  high-grade spinal canal or neural foraminal stenosis.      LEFT HIP TWO TO THREE VIEWS   12/6/2021                                                                   IMPRESSION:  Anatomic alignment  left hip. No acute displaced left hip  fracture. Mild left hip osteoarthritis. 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 Bilateral tubal occluder devices project over the  superior pelvis. Symmetric SI joints.     Objective:    CONSTITUTIONAL:  Vital signs as above.  No acute distress.  The patient is well nourished and well groomed.    PSYCHIATRIC:  The patient is awake, alert, oriented to person, place and time.  The patient is answering questions appropriately with clear speech.  Normal affect.  Able to follow commands  SKIN:  Skin over the face, posterior torso, and  extremities is free of significant lesions.    RESPIRATORY:  normal respiratory excursion and no dyspnea.  MUSCULOSKELETAL:  Gait is non-antalgic.  The patient is able to heel and toe walk without any difficulty.   Squat and rise normal.   .  Back ROM: Full fluid and painless with no instability.     NEUROLOGICAL:    Motor:  L3-S1 myotomes 5/5     Sensation to light touch is intact in the bilateral lower extremities.    SLR negative    Pelvis: Negative standing flexion, stork test and Leeanne sign.  Pelvic alignment neutral    Assessment     Siri Vyas  is a 51 year old y.o. female who presents today for follow-up regarding resolved Pelvic Joint Dysfunction and establishment of pelvic stability.      Plan:  Advance activities as tolerated, maintain a home exercise program indefinitely, pay attention to good body mechanics, and if she suddenly notices a change in the quality and severity of her pain accompanied by inability to tolerate torso movements, she should come in early for an assessment and treatment presuming recurrent Pelvic Joint Dysfunction at that time.    Advised patient to call or return early if symptoms worsen, or having problems controlling bladder and bowel function or worsening leg weakness.     Please note: Voice recognition software was used in this dictation.  It may therefore contain typographical errors.  Nick Sterling,  MD

## 2022-02-24 ENCOUNTER — TRANSFERRED RECORDS (OUTPATIENT)
Dept: HEALTH INFORMATION MANAGEMENT | Facility: CLINIC | Age: 52
End: 2022-02-24
Payer: COMMERCIAL

## 2022-02-28 ENCOUNTER — OFFICE VISIT (OUTPATIENT)
Dept: NEUROSURGERY | Facility: CLINIC | Age: 52
End: 2022-02-28
Payer: COMMERCIAL

## 2022-02-28 VITALS
HEART RATE: 85 BPM | WEIGHT: 225 LBS | SYSTOLIC BLOOD PRESSURE: 120 MMHG | DIASTOLIC BLOOD PRESSURE: 80 MMHG | BODY MASS INDEX: 38.41 KG/M2 | HEIGHT: 64 IN

## 2022-02-28 DIAGNOSIS — M99.04 SACROILIAC JOINT SOMATIC DYSFUNCTION: Primary | ICD-10-CM

## 2022-02-28 PROCEDURE — 99213 OFFICE O/P EST LOW 20 MIN: CPT | Performed by: PREVENTIVE MEDICINE

## 2022-02-28 ASSESSMENT — PAIN SCALES - GENERAL: PAINLEVEL: MODERATE PAIN (4)

## 2022-02-28 NOTE — LETTER
"    2/28/2022         RE: Siri Vyas  [address]  [place] [facility] [address]        Dear Colleague,    Thank you for referring your patient, Siri Vyas, to the [facility]. Please see a copy of my visit note below.      Subjective:  Siri Vyas is a 51 year old female who presents today for follow-up regarding Pelvic Joint Dysfunction manifesting on 12/28/2021 as a left anterior innominate rotation, and also significant left gluteal myofascial pain radiating into the left leg.  I sent her for pelvic stabilization PT with Reynaldo Navarro    PRIOR HISTORY from 12/20/2021:  Her left hip x-ray shows minimal DJD and no other abnormalities.  Her lumbar MRI is compared to a prior study from 2009 but the radiologist did not specifically indicate what is different.,  And no high-grade stenosis to explain left-sided symptoms is seen.  Minimal spondylosis for age.     Onset was about a year ago without a specific injury but she was working from home during the pandemic and sitting at a high top table on a high top stool with her legs often dangling.  That seemed to be what started all this and eventually she got her office chair with more proper ergonomics but it did not help her symptoms.  Chiropractic care has not really helped.  She is experiencing pain in the left SI area and a \"like a pinched nerve\" sensation in her left inguinal area the latter which is intermittent with the former is constant.  She also has intermittent tingling down the back of her left leg but that is not true numbness and not true weakness.  No red flags on review of systems    Treatment to Date: Methylprednisolone, gabapentin, Robaxin, chiropractic care, PT, OMT.    INTERIM HISTORY:    Previously for about a year her pain was at a constant 8-9/10.  Today it is a 3-4 and at worst it goes up to a 6 and she is quite pleased with her progress.  The quality the pain is less sharp and her function has improved " dramatically.  Her physical therapist notes attainment of pelvic stability and she has been taught a good home program as well as proper lifting techniques.  We talked about what to expect in the future in what symptom changes would be expected if a Pelvic Joint Dysfunction recurs.  She still has some difficulty tolerating a left Tony's position    Past medical history is reviewed and is unchanged for any new medical diagnoses in the interim.    Pertinent for obesity, status post left leg thrombophlebitis    Social history is reviewed and is unchanged in the interim.  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  She is  with 4 children.  Her father lives with her.  Prior to the onset of the current condition, she was jogging and walking and was doing some 5K runs, playing basketball with her children ages 17-24.  She played midfield in soccer and also basketball in high school and she weighed 100 pounds less in those days.    Review of Systems:   Pertinent negatives include no fevers, chills, unexplained weight loss, bowel incontinence, bladder incontinence, trips, stumbles, falls.  All others reviewed and are negative. See patient's intake questionnaire from today for pain diagram, and further details.    IMAGING: Images and report reviewed     MR LUMBAR SPINE W/O CONTRAST 12/21/2021     Comparison: MRI 11/11/2009     L4-5: Circumferential disc bulge. Bilateral facet arthropathy.  Ligamentum flavum thickening. Minimal narrowing of the right lateral  recess. Mild bilateral neuroforaminal stenosis. No spinal canal  stenosis. Progressed.      L5-S1: Disc bulge. Left facet arthropathy. Mild left neural foraminal  narrowing. No spinal canal stenosis. Progressed.                                                              Impression: Multilevel lumbar spondylosis, most pronounced at L4-5  with mild neural foraminal stenosis bilaterally. Additionally, there  is  mild left neural foraminal stenosis at L5-S1. No evidence for  high-grade spinal canal or neural foraminal stenosis.      LEFT HIP TWO TO THREE VIEWS   12/6/2021                                                                   IMPRESSION:  Anatomic alignment left hip. No acute displaced left hip  fracture. Mild left hip osteoarthritis. 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 Bilateral tubal occluder devices project over the  superior pelvis. Symmetric SI joints.     Objective:    CONSTITUTIONAL:  Vital signs as above.  No acute distress.  The patient is well nourished and well groomed.    PSYCHIATRIC:  The patient is awake, alert, oriented to person, place and time.  The patient is answering questions appropriately with clear speech.  Normal affect.  Able to follow commands  SKIN:  Skin over the face, posterior torso, and  extremities is free of significant lesions.    RESPIRATORY:  normal respiratory excursion and no dyspnea.  MUSCULOSKELETAL:  Gait is non-antalgic.  The patient is able to heel and toe walk without any difficulty.   Squat and rise normal.   .  Back ROM: Full fluid and painless with no instability.     NEUROLOGICAL:    Motor:  L3-S1 myotomes 5/5     Sensation to light touch is intact in the bilateral lower extremities.    SLR negative    Pelvis: Negative standing flexion, stork test and Leeanne sign.  Pelvic alignment neutral    Assessment     Siri Vyas  is a 51 year old y.o. female who presents today for follow-up regarding resolved Pelvic Joint Dysfunction and establishment of pelvic stability.      Plan:  Advance activities as tolerated, maintain a home exercise program indefinitely, pay attention to good body mechanics, and if she suddenly notices a change in the quality and severity of her pain accompanied by inability to tolerate torso movements, she should come in early for an assessment and treatment presuming recurrent Pelvic Joint Dysfunction at that time.    Advised patient  to call or return early if symptoms worsen, or having problems controlling bladder and bowel function or worsening leg weakness.     Please note: Voice recognition software was used in this dictation.  It may therefore contain typographical errors.  Nick Sterling MD      Again, thank you for allowing me to participate in the care of your patient.        Sincerely,        Nick Sterling MD

## 2022-02-28 NOTE — PATIENT INSTRUCTIONS
Is good to see you again Siri and I am glad that this treatment has been helpful to you.  Its not perfect but definitely better.  See the assessment and plan below, and at this point we will keep your follow-up to an as-needed basis if your pain gets suddenly sharp and severe and you cannot do torso movements.    Assessment     Siri Vyas  is a 51 year old y.o. female who presents today for follow-up regarding resolved Pelvic Joint Dysfunction and establishment of pelvic stability.      Plan:  Advance activities as tolerated, maintain a home exercise program indefinitely, pay attention to good body mechanics, and if she suddenly notices a change in the quality and severity of her pain accompanied by inability to tolerate torso movements, she should come in early for an assessment and treatment presuming recurrent Pelvic Joint Dysfunction at that time.

## 2022-02-28 NOTE — NURSING NOTE
"Reason For Visit:   Chief Complaint   Patient presents with     RECHECK             Occupation: computer work  Currently working? Yes.  Work status?  Full time.     Sports: n  Activities: walking           /80   Pulse 85   Ht 1.613 m (5' 3.5\")   Wt 102.1 kg (225 lb)   BMI 39.23 kg/m        Allergies   Allergen Reactions     Dust Mites      No Clinical Screening - See Comments Other (See Comments)     No Known Drug Allergy      Seasonal Allergies        Current Outpatient Medications   Medication     acetaminophen (TYLENOL) 325 MG tablet     aspirin 325 MG EC tablet     Cholecalciferol (VITAMIN D3) 25 MCG (1000 UT) CAPS     fexofenadine (ALLEGRA) 180 MG tablet     GLUCOSAMINE-CHONDROITIN PO     Misc Natural Products (OSTEO BI-FLEX JOINT SHIELD PO)     Multiple Vitamin (MULTIVITAMINS PO)     Omega-3 Fatty Acids (OMEGA-3 FISH OIL PO)     methocarbamol (ROBAXIN) 500 MG tablet     Current Facility-Administered Medications   Medication     betamethasone acet & sod phos (CELESTONE) injection 6 mg     betamethasone acet & sod phos (CELESTONE) injection 6 mg     lidocaine 1 % injection 4 mL     lidocaine 1 % injection 4 mL     lidocaine 1 % injection 5 mL     lidocaine 1 % injection 5 mL     methylPREDNISolone (DEPO-MEDROL) injection 80 mg     methylPREDNISolone (DEPO-MEDROL) injection 80 mg         Darla Severin-Brown, LPN  "

## 2022-03-12 ENCOUNTER — HEALTH MAINTENANCE LETTER (OUTPATIENT)
Age: 52
End: 2022-03-12

## 2022-03-26 ENCOUNTER — OFFICE VISIT (OUTPATIENT)
Dept: URGENT CARE | Facility: URGENT CARE | Age: 52
End: 2022-03-26
Payer: COMMERCIAL

## 2022-03-26 VITALS
WEIGHT: 220.8 LBS | SYSTOLIC BLOOD PRESSURE: 122 MMHG | TEMPERATURE: 98.2 F | BODY MASS INDEX: 38.5 KG/M2 | DIASTOLIC BLOOD PRESSURE: 77 MMHG | HEART RATE: 88 BPM | OXYGEN SATURATION: 97 %

## 2022-03-26 DIAGNOSIS — J01.00 ACUTE MAXILLARY SINUSITIS, RECURRENCE NOT SPECIFIED: Primary | ICD-10-CM

## 2022-03-26 PROCEDURE — 99213 OFFICE O/P EST LOW 20 MIN: CPT | Performed by: EMERGENCY MEDICINE

## 2022-03-26 RX ORDER — FLUTICASONE PROPIONATE 50 MCG
1 SPRAY, SUSPENSION (ML) NASAL DAILY
Qty: 9.9 ML | Refills: 0 | Status: SHIPPED | OUTPATIENT
Start: 2022-03-26 | End: 2022-03-31

## 2022-03-26 NOTE — PROGRESS NOTES
"Assessment & Plan     Diagnosis:    (J01.00) Acute maxillary sinusitis, recurrence not specified  (primary encounter diagnosis)  Plan: fluticasone (FLONASE) 50 MCG/ACT nasal spray      Medical Decision Making:  Siri Vyas is a 51 year old female who presents to clinic today for evaluation of facial pain/pressure for the past two days.  Signs and symptoms are consistent with sinusitis.  Discussed viral vs bacterial sinusitis with the patient. Suspect likely viral although could be secondary to allergic rhinitis.     Outpatient medications ordered as noted above.  No evidence of fungal or bacterial sinusitis, meningitis, encephalitis, cavernous sinus thrombosis, ocular pathology, intracerebral bleed, serious bacterial infection otherwise.  Supportive outpatient management indicated.  Patient verbalizes understanding and agreement with the plan including reasons to go to the ER. All questions answered.       Shreyas Kennedy PA-C  [facility]    Subjective     Siri Vyas is a 51 year old female who presents to clinic today for the following health issues:  Chief Complaint   Patient presents with     chest congestion     cough, congestion, losing voice  2-3 days     Cough     cough 2-3 days     Headache     headache is in the front of head 2-3 days     Otalgia     pt having pain in both ears more so in the left ear started yesterday       HPI    URI Adult    Onset of symptoms was 2 day(s) ago.  Course of illness is worsening.    Severity moderate  Current and Associated symptoms: stuffy nose, cough - non-productive, sore throat, hoarse voice and facial pain/pressure  Treatment measures tried include Tylenol/Ibuprofen and Decongestants.  Predisposing factors include None.    Patient describes the symptoms as \"lots of pressure in my face\"     Patient denies any shortness of breath, chest pain, purulent drainage from the nose, fevers, neck pain or stiffness, ear pain or drainage, difficulty " swallowing food or fluids or other concerns at this time.    Review of Systems    See HPI    Objective      Vitals: /77   Pulse 88   Temp 98.2  F (36.8  C) (Oral)   Wt 100.2 kg (220 lb 12.8 oz)   SpO2 97%   BMI 38.50 kg/m    Resp: 20    Patient Vitals for the past 24 hrs:   BP Temp Temp src Pulse SpO2 Weight   03/26/22 1220 122/77 98.2  F (36.8  C) Oral 88 97 % 100.2 kg (220 lb 12.8 oz)       Vital signs reviewed by: Shreyas Kennedy PA-C    Physical Exam   Constitutional: Patient is alert and cooperative. No acute distress.  HENT:   Right Ear: External ear normal. TM is clear.  Left Ear: External ear normal. TM is clear.  Nose: Nasal turbinates are erythematous and edematous.  Maxillary sinus tenderness to percussion.  No septal defect or hematoma.  No purulent discharge from the nose or epistaxis.  Mouth: Normal tongue and tonsil. Posterior oropharynx is erythematous; no exudates. Uvula is midline.  Eyes: Conjunctivae, EOMI and lids are normal. PERRL.  Cardiovascular: Regular rate and rhythm  Pulmonary/Chest: Lungs are clear to auscultation throughout. Effort normal. No respiratory distress. No wheezes, rales or rhonchi.  Neurological: Alert and oriented x3.  Musculoskeletal: Normal range of motion. Normal tone.  Skin: No rash noted on visualized skin.  Psychiatric:The patient has a normal mood and affect.         Shreyas Kennedy PA-C, March 26, 2022

## 2022-03-26 NOTE — PATIENT INSTRUCTIONS
Patient Education     Understanding Acute Rhinosinusitis  Acute rhinosinusitis is when the lining of the inside of the nose and the sinuses becomes irritated and swollen. It is also called sinusitis, or a sinus infection.  Sinuses are air-filled spaces in the skull behind the face. They are kept moist and clean by a lining of mucosa. Things such as pollen, smoke, and chemical fumes can irritate the mucosa. It can then swell up. As a response to irritation, the mucosa makes more mucus and other fluids. Tiny hairlike cilia cover the mucosa. Cilia help carry mucus toward the opening of the sinus. Too much mucus may cause the cilia to stop working. This blocks the sinus opening. A buildup of fluid in the sinuses then causes pain and pressure. It can also cause bacteria to grow in the sinuses.    What causes acute rhinosinusitis?  A sinus infection is most often caused by a virus. You are more likely to get one after having a cold or the flu. In some cases, a sinus infection can be caused by bacteria.  You are at higher risk for a sinus infection if you:    Are older in age    Have structural problems with your sinuses    Smoke or are exposed to secondhand smoke    Are exposed to changes in pressure, such as from flying a lot or deep sea diving    Have asthma or allergies    Have a weak immune system    Have dental disease     Symptoms of acute rhinosinusitis  Symptoms of acute rhinosinusitis often last around 7 to 10 days. If you have a bacterial infection, they may last longer. They may also get better but then worsen. You may have:    Face pain or pressure under the eyes and around the nose    Headache    Fluid draining in the back of the throat (postnasal drip)    Congestion    Drainage that is thick and colored (often green), instead of clear    Cough    Problems with your sense of smell    Ear pain or hearing problems    Fever    Tooth pain    Fatigue  Diagnosing acute rhinosinusitis  Your healthcare provider will  ask about your symptoms and past health. He or she will look at your ears, nose, throat, and sinuses. Imaging tests, such as X-rays, are often not needed.  It can be hard to figure out if a sinus infection is caused by a virus or bacterium. A bacterial infection tends to last longer. Symptoms may also get better but then worsen. Your healthcare provider may take a sample of mucus from your nose to check for bacteria.  Treating acute rhinosinusitis  Most sinus infections will go away within 10 days. Your body will fight off the virus. If your symptoms seem to get better but then worsen, you may have a bacterial infection instead. Your healthcare provider will then give you antibiotics. Take this medicine until it is gone, even if you feel better.  To help ease your symptoms, your healthcare provider may advise:    Over-the-counter pain relievers. Medicines such as acetaminophen or ibuprofen can ease sinus pain. They may also lower a fever.    Nasal washes. Washing your nasal passages with salt water may ease pain and pressure. It can rinse out mucous and other irritants from your sinuses. Your healthcare provider can show you how to do it.    Nasal steroid spray. This prescription medicine can reduce inflammation in your sinuses.    Other medicines. Decongestants, antihistamines, and other nasal sprays may give short-term relief. They may help with congestion. Talk with your healthcare provider before taking these medicines.     Preventing acute rhinosinusitis  You can help prevent a sinus infection with these steps:    Wash your hands well and often.    Stay away from people who have a cold or upper respiratory infection.    Don't smoke. And stay away from secondhand smoke.    Use a humidifier at home.    Make sure you are up-to-date on your vaccines, such as the flu shot.     When to call your healthcare provider  Call your healthcare provider right away if you have any of these:    Fever of 100.4 F (38 C) or  higher, or as directed by your healthcare provider    Pain that gets worse    Symptoms that don t get better, or get worse    New symptoms  [employer] last reviewed this educational content on 6/1/2019 2000-2021 [employer]. All rights reserved. This information is not intended as a substitute for professional medical care. Always follow your healthcare professional's instructions.

## 2022-09-26 ENCOUNTER — MYC MEDICAL ADVICE (OUTPATIENT)
Dept: FAMILY MEDICINE | Facility: CLINIC | Age: 52
End: 2022-09-26

## 2022-09-26 NOTE — TELEPHONE ENCOUNTER
Printed [place] forms and placed in Dr Coleman's box.  [facility]  2nd Floor  Primary Care

## 2022-09-28 NOTE — TELEPHONE ENCOUNTER
Form faxed to ori [phone], copy placed in abstract and original placed in tc bin. Copy mailed to pt's home.

## 2022-11-10 ENCOUNTER — OFFICE VISIT (OUTPATIENT)
Dept: FAMILY MEDICINE | Facility: CLINIC | Age: 52
End: 2022-11-10
Payer: COMMERCIAL

## 2022-11-10 VITALS
OXYGEN SATURATION: 96 % | RESPIRATION RATE: 20 BRPM | SYSTOLIC BLOOD PRESSURE: 112 MMHG | TEMPERATURE: 97 F | HEART RATE: 83 BPM | BODY MASS INDEX: 37.83 KG/M2 | WEIGHT: 221.6 LBS | HEIGHT: 64 IN | DIASTOLIC BLOOD PRESSURE: 78 MMHG

## 2022-11-10 DIAGNOSIS — Z23 ENCOUNTER FOR IMMUNIZATION: ICD-10-CM

## 2022-11-10 DIAGNOSIS — R10.31 ABDOMINAL PAIN, RIGHT LOWER QUADRANT: Primary | ICD-10-CM

## 2022-11-10 DIAGNOSIS — Z12.31 VISIT FOR SCREENING MAMMOGRAM: ICD-10-CM

## 2022-11-10 PROCEDURE — 90471 IMMUNIZATION ADMIN: CPT | Performed by: FAMILY MEDICINE

## 2022-11-10 PROCEDURE — 99213 OFFICE O/P EST LOW 20 MIN: CPT | Mod: 25 | Performed by: FAMILY MEDICINE

## 2022-11-10 PROCEDURE — 90682 RIV4 VACC RECOMBINANT DNA IM: CPT | Performed by: FAMILY MEDICINE

## 2022-11-10 PROCEDURE — 0124A COVID-19,PF,PFIZER BOOSTER BIVALENT: CPT | Performed by: FAMILY MEDICINE

## 2022-11-10 PROCEDURE — 91312 COVID-19,PF,PFIZER BOOSTER BIVALENT: CPT | Performed by: FAMILY MEDICINE

## 2022-11-10 ASSESSMENT — PAIN SCALES - GENERAL: PAINLEVEL: NO PAIN (0)

## 2022-11-10 NOTE — PROGRESS NOTES
"  Jayne Horner is a 52 year old presenting for the following health issues:  Hospital F/U      HPI       Hospital Follow-up Visit:    Hospital/Nursing Home/IP Rehab Facility: [facility]  Date of Admission: 9/14/2022  Date of Discharge: 9/20/2022  Reason(s) for Admission: Abdominal Pain    Was your hospitalization related to COVID-19? No   Problems taking medications regularly:  None  Medication changes since discharge: None  Problems adhering to non-medication therapy:  None    Summary of hospitalization:  CareEverywhere information obtained and reviewed  Diagnostic Tests/Treatments reviewed.  Follow up needed: none  Other Healthcare Providers Involved in Patient s Care:         None  Update since discharge: improved.     Plan of care communicated with patient       Review of Systems   Constitutional, HEENT, cardiovascular, pulmonary, GI, , musculoskeletal, neuro, skin, endocrine and psych systems are negative, except as otherwise noted.      Objective    /78 (BP Location: Left arm, Patient Position: Sitting, Cuff Size: Adult Large)   Pulse 83   Temp 97  F (36.1  C) (Tympanic)   Resp 20   Ht 1.613 m (5' 3.5\")   Wt 100.5 kg (221 lb 9.6 oz)   SpO2 96%   BMI 38.64 kg/m    Body mass index is 38.64 kg/m .  Physical Exam   GENERAL: healthy, alert and no distress  NECK: no adenopathy, no asymmetry, masses, or scars and thyroid normal to palpation  RESP: lungs clear to auscultation - no rales, rhonchi or wheezes  CV: regular rate and rhythm, normal S1 S2, no S3 or S4, no murmur, click or rub, no peripheral edema and peripheral pulses strong  ABDOMEN: soft, nontender, no hepatosplenomegaly, no masses and bowel sounds normal  MS: no gross musculoskeletal defects noted, no edema    A/P:  (R10.31) Abdominal pain, right lower quadrant  (primary encounter diagnosis)  Comment:   Plan: resolved. Secondary to right ovarian functional cyst? No pain today.    (Z12.31) Visit for screening mammogram  Comment: "   Plan: MA SCREENING DIGITAL BILAT - Future  (s+30)            (Z23) Encounter for immunization  Comment:   Plan: INFLUENZA QUAD, RECOMBINANT, P-FREE (RIV4)         (FLUBLOK) AGE 50-64 [FSD555],         COVID-19,PF,PFIZER BOOSTER BIVALENT            Chapito Coleman MD

## 2022-11-18 ENCOUNTER — MYC MEDICAL ADVICE (OUTPATIENT)
Dept: FAMILY MEDICINE | Facility: CLINIC | Age: 52
End: 2022-11-18

## 2022-11-18 DIAGNOSIS — N64.4 BREAST PAIN: Primary | ICD-10-CM

## 2022-11-18 NOTE — TELEPHONE ENCOUNTER
Referral for diagnostic mammogram place in TC bin. Please fax to [facility] in [place].    Chapito Coleman MD

## 2022-11-21 NOTE — TELEPHONE ENCOUNTER
Received printed Diagnostic Digital Bilateral mammogram and Breast ultrasound order, Faxed to [email or web address], [phone], right fax confirmed at 11:30 am today, 11/21/2022. Placed in 's copy basket.  Molly Irving MA  [facility] [place]  2nd Floor  Primary Care

## 2022-11-28 ENCOUNTER — TRANSFERRED RECORDS (OUTPATIENT)
Dept: HEALTH INFORMATION MANAGEMENT | Facility: CLINIC | Age: 52
End: 2022-11-28

## 2023-04-23 ENCOUNTER — HEALTH MAINTENANCE LETTER (OUTPATIENT)
Age: 53
End: 2023-04-23

## 2023-06-29 ENCOUNTER — LAB REQUISITION (OUTPATIENT)
Dept: LAB | Facility: CLINIC | Age: 53
End: 2023-06-29

## 2023-06-29 DIAGNOSIS — Z13.220 ENCOUNTER FOR SCREENING FOR LIPOID DISORDERS: ICD-10-CM

## 2023-06-29 DIAGNOSIS — Z13.1 ENCOUNTER FOR SCREENING FOR DIABETES MELLITUS: ICD-10-CM

## 2023-06-29 DIAGNOSIS — Z13.29 ENCOUNTER FOR SCREENING FOR OTHER SUSPECTED ENDOCRINE DISORDER: ICD-10-CM

## 2023-06-29 LAB
CHOLEST SERPL-MCNC: 225 MG/DL
FASTING STATUS PATIENT QL REPORTED: YES
GLUCOSE SERPL-MCNC: 99 MG/DL (ref 70–99)
HBA1C MFR BLD: 5.9 %
HDLC SERPL-MCNC: 56 MG/DL
LDLC SERPL CALC-MCNC: 137 MG/DL
NONHDLC SERPL-MCNC: 169 MG/DL
TRIGL SERPL-MCNC: 159 MG/DL
TSH SERPL DL<=0.005 MIU/L-ACNC: 2.46 UIU/ML (ref 0.3–4.2)

## 2023-06-29 PROCEDURE — 83036 HEMOGLOBIN GLYCOSYLATED A1C: CPT | Performed by: OBSTETRICS & GYNECOLOGY

## 2023-06-29 PROCEDURE — 82947 ASSAY GLUCOSE BLOOD QUANT: CPT | Performed by: OBSTETRICS & GYNECOLOGY

## 2023-06-29 PROCEDURE — 84443 ASSAY THYROID STIM HORMONE: CPT | Performed by: OBSTETRICS & GYNECOLOGY

## 2023-06-29 PROCEDURE — 80061 LIPID PANEL: CPT | Performed by: OBSTETRICS & GYNECOLOGY

## 2023-07-12 NOTE — PROGRESS NOTES
"  Subjective:  Siri Vyas is a 52 year old female who presents today for follow-up regarding     Resolved Pelvic Joint Dysfunction without significant instability  Return as needed.    PRIOR HISTORY from 12/20/2021:  Her left hip x-ray shows minimal DJD and no other abnormalities.  Her lumbar MRI is compared to a prior study from 2009 but the radiologist did not specifically indicate what is different.,  And no high-grade stenosis to explain left-sided symptoms is seen.  Minimal spondylosis for age.     Onset was about a year ago without a specific injury but she was working from home during the pandemic and sitting at a high top table on a high top stool with her legs often dangling.  That seemed to be what started all this and eventually she got her office chair with more proper ergonomics but it did not help her symptoms.  Chiropractic care has not really helped.  She is experiencing pain in the left SI area and a \"like a pinched nerve\" sensation in her left inguinal area the latter which is intermittent with the former is constant.  She also has intermittent tingling down the back of her left leg but that is not true numbness and not true weakness.  No red flags on review of systems     Treatment to Date: Methylprednisolone, gabapentin, Robaxin, chiropractic care, PT, OMT.     Updated 2/28/2022 HISTORY:    Previously for about a year her pain was at a constant 8-9/10.  Today it is a 3-4 and at worst it goes up to a 6 and she is quite pleased with her progress.  The quality the pain is less sharp and her function has improved dramatically.  Her physical therapist notes attainment of pelvic stability and she has been taught a good home program as well as proper lifting techniques.  We talked about what to expect in the future in what symptom changes would be expected if a Pelvic Joint Dysfunction recurs.  She still has some difficulty tolerating a left Tony's position    INTERIM HISTORY:    About 3 weeks " ago without any particular inciting event, states his pain went from being manageable and her function was reasonably good to increasing pain in the back on the back of the thigh and calf on the left.  She has pain in the butt left SI area which is constant and her biggest concern.  Her leg symptoms are intermittent.  A prolonged 2 and half hour drive flared her up recently.    Past medical history is reviewed and is unchanged for any new medical diagnoses in the interim.    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  She is  with 4 children.  Her father lives with her.  Prior to the onset of the current condition, she was jogging and walking and was doing some 5K runs, playing basketball with her children ages 17-24.  She played midfield in soccer and also basketball in high school and she weighed 100 pounds less in those days.  She has been able to resume playing basketball with her children and walks on the treadmill 2 to 3 miles once or twice a week.    Objective:    IMAGING:   Images and report reviewed     MR LUMBAR SPINE W/O CONTRAST 12/21/2021     Comparison: MRI 11/11/2009     L4-5: Circumferential disc bulge. Bilateral facet arthropathy.  Ligamentum flavum thickening. Minimal narrowing of the right lateral  recess. Mild bilateral neuroforaminal stenosis. No spinal canal  stenosis. Progressed.      L5-S1: Disc bulge. Left facet arthropathy. Mild left neural foraminal  narrowing. No spinal canal stenosis. Progressed.                                                              Impression: Multilevel lumbar spondylosis, most pronounced at L4-5  with mild neural foraminal stenosis bilaterally. Additionally, there  is mild left neural foraminal stenosis at L5-S1. No evidence for  high-grade spinal canal or neural foraminal stenosis.      LEFT HIP TWO TO THREE VIEWS    12/6/2021                                                                   IMPRESSION:  Anatomic alignment left hip. No acute displaced left hip  fracture. Mild left hip osteoarthritis. 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 Bilateral tubal occluder devices project over the  superior pelvis. Symmetric SI joints.    EXAMINATION:    CONSTITUTIONAL:  Vital signs as above.  No acute distress.  The patient is well nourished and well groomed.  She transitions well and moves fluidly  PSYCHIATRIC:  The patient is awake, alert, oriented to person, place and time.  The patient is answering questions appropriately with clear speech.  Normal affect.  Able to follow commands  MUSCULOSKELETAL:  Gait is non-antalgic.  The patient is able to heel and toe walk without any difficulty.   Squat and rise normal.  .  Back ROM: Full and painless in flexion and arising without instability.  Painful with extension.     NEUROLOGICAL:    Motor:  L3-S1 myotomes 5/5     Sensation to light touch is intact in the bilateral lower extremities.    SLR negative    Pelvis: Standing flexion positive right.  Stork positive left.  Leeanne's sign negative.  She had a Pelvic Joint Dysfunction manifesting as a right posterior innominate rotation.  Long sitting test long to short right    Procedure note-OMT:  Manual medicine restored normal pelvic alignment.  Leg lengths were even with negative long sitting test.  Stork test and standing flexion became negative.  She had much less pain with lumbar range of motion.    Assessment     Siri Vyas  is a 52 year old y.o. female who presents today for follow-up regarding     Low back pain-chronic    Recurrent Pelvic Joint Dysfunction (right posterior innominate rotation)    Neurologically intact    Clinically no evidence of radiculopathy or nerve impingement      Plan:  She has gotten away a little bit from her shotgun and broomstick technique which I would like her to resume on a daily basis and  come back and see me if she is not happy.  Gradually resume activities as tolerated.  I gave her the gluteal myofascial protocols to remind her how to do those as well as the Pelvic Joint Dysfunction self-correction    Advised patient to call or return early if symptoms worsen, or having problems controlling bladder and bowel function or worsening leg weakness.     Please note: Voice recognition software was used in this dictation.  It may therefore contain typographical errors.  Nick Sterling MD

## 2023-07-18 ENCOUNTER — OFFICE VISIT (OUTPATIENT)
Dept: NEUROSURGERY | Facility: CLINIC | Age: 53
End: 2023-07-18
Payer: COMMERCIAL

## 2023-07-18 VITALS
BODY MASS INDEX: 39.27 KG/M2 | SYSTOLIC BLOOD PRESSURE: 108 MMHG | HEIGHT: 64 IN | HEART RATE: 84 BPM | DIASTOLIC BLOOD PRESSURE: 74 MMHG | WEIGHT: 230 LBS

## 2023-07-18 DIAGNOSIS — M99.04 SACROILIAC JOINT SOMATIC DYSFUNCTION: Primary | ICD-10-CM

## 2023-07-18 PROCEDURE — 98925 OSTEOPATH MANJ 1-2 REGIONS: CPT | Performed by: PREVENTIVE MEDICINE

## 2023-07-18 PROCEDURE — 99213 OFFICE O/P EST LOW 20 MIN: CPT | Mod: 25 | Performed by: PREVENTIVE MEDICINE

## 2023-07-18 RX ORDER — CALCIUM CARBONATE/VITAMIN D3 500-10/5ML
LIQUID (ML) ORAL DAILY
COMMUNITY

## 2023-07-18 ASSESSMENT — PAIN SCALES - GENERAL: PAINLEVEL: SEVERE PAIN (7)

## 2023-07-18 NOTE — LETTER
"    7/18/2023         RE: Siri Vyas  [address]  [place] [facility] [address]        Dear Colleague,    Thank you for referring your patient, Siri Vyas, to the [facility]. Please see a copy of my visit note below.      Subjective:  Siri Vyas is a 52 year old female who presents today for follow-up regarding   Resolved Pelvic Joint Dysfunction without significant instability  Return as needed.    PRIOR HISTORY from 12/20/2021:  Her left hip x-ray shows minimal DJD and no other abnormalities.  Her lumbar MRI is compared to a prior study from 2009 but the radiologist did not specifically indicate what is different.,  And no high-grade stenosis to explain left-sided symptoms is seen.  Minimal spondylosis for age.     Onset was about a year ago without a specific injury but she was working from home during the pandemic and sitting at a high top table on a high top stool with her legs often dangling.  That seemed to be what started all this and eventually she got her office chair with more proper ergonomics but it did not help her symptoms.  Chiropractic care has not really helped.  She is experiencing pain in the left SI area and a \"like a pinched nerve\" sensation in her left inguinal area the latter which is intermittent with the former is constant.  She also has intermittent tingling down the back of her left leg but that is not true numbness and not true weakness.  No red flags on review of systems     Treatment to Date: Methylprednisolone, gabapentin, Robaxin, chiropractic care, PT, OMT.     Updated 2/28/2022 HISTORY:    Previously for about a year her pain was at a constant 8-9/10.  Today it is a 3-4 and at worst it goes up to a 6 and she is quite pleased with her progress.  The quality the pain is less sharp and her function has improved dramatically.  Her physical therapist notes attainment of pelvic stability and she has been taught a good home program as well as " proper lifting techniques.  We talked about what to expect in the future in what symptom changes would be expected if a Pelvic Joint Dysfunction recurs.  She still has some difficulty tolerating a left Tony's position    INTERIM HISTORY:    About 3 weeks ago without any particular inciting event, states his pain went from being manageable and her function was reasonably good to increasing pain in the back on the back of the thigh and calf on the left.  She has pain in the butt left SI area which is constant and her biggest concern.  Her leg symptoms are intermittent.  A prolonged 2 and half hour drive flared her up recently.    Past medical history is reviewed and is unchanged for any new medical diagnoses in the interim.    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  She is  with 4 children.  Her father lives with her.  Prior to the onset of the current condition, she was jogging and walking and was doing some 5K runs, playing basketball with her children ages 17-24.  She played midfield in soccer and also basketball in high school and she weighed 100 pounds less in those days.  She has been able to resume playing basketball with her children and walks on the treadmill 2 to 3 miles once or twice a week.    Objective:    IMAGING:   Images and report reviewed     MR LUMBAR SPINE W/O CONTRAST 12/21/2021     Comparison: MRI 11/11/2009     L4-5: Circumferential disc bulge. Bilateral facet arthropathy.  Ligamentum flavum thickening. Minimal narrowing of the right lateral  recess. Mild bilateral neuroforaminal stenosis. No spinal canal  stenosis. Progressed.      L5-S1: Disc bulge. Left facet arthropathy. Mild left neural foraminal  narrowing. No spinal canal stenosis. Progressed.                                                              Impression: Multilevel lumbar spondylosis,  most pronounced at L4-5  with mild neural foraminal stenosis bilaterally. Additionally, there  is mild left neural foraminal stenosis at L5-S1. No evidence for  high-grade spinal canal or neural foraminal stenosis.      LEFT HIP TWO TO THREE VIEWS   12/6/2021                                                                   IMPRESSION:  Anatomic alignment left hip. No acute displaced left hip  fracture. Mild left hip osteoarthritis. 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 Bilateral tubal occluder devices project over the  superior pelvis. Symmetric SI joints.    EXAMINATION:    CONSTITUTIONAL:  Vital signs as above.  No acute distress.  The patient is well nourished and well groomed.  She transitions well and moves fluidly  PSYCHIATRIC:  The patient is awake, alert, oriented to person, place and time.  The patient is answering questions appropriately with clear speech.  Normal affect.  Able to follow commands  MUSCULOSKELETAL:  Gait is non-antalgic.  The patient is able to heel and toe walk without any difficulty.   Squat and rise normal.  .  Back ROM: Full and painless in flexion and arising without instability.  Painful with extension.     NEUROLOGICAL:    Motor:  L3-S1 myotomes 5/5     Sensation to light touch is intact in the bilateral lower extremities.    SLR negative    Pelvis: Standing flexion positive right.  Stork positive left.  Leeanne's sign negative.  She had a Pelvic Joint Dysfunction manifesting as a right posterior innominate rotation.  Long sitting test long to short right    Procedure note-OMT:  Manual medicine restored normal pelvic alignment.  Leg lengths were even with negative long sitting test.  Stork test and standing flexion became negative.  She had much less pain with lumbar range of motion.    Assessment     Siri Vyas  is a 52 year old y.o. female who presents today for follow-up regarding   Low back pain-chronic  Recurrent Pelvic Joint Dysfunction (right posterior innominate  rotation)  Neurologically intact  Clinically no evidence of radiculopathy or nerve impingement      Plan:  She has gotten away a little bit from her shotgun and broomstick technique which I would like her to resume on a daily basis and come back and see me if she is not happy.  Gradually resume activities as tolerated.  I gave her the gluteal myofascial protocols to remind her how to do those as well as the Pelvic Joint Dysfunction self-correction    Advised patient to call or return early if symptoms worsen, or having problems controlling bladder and bowel function or worsening leg weakness.     Please note: Voice recognition software was used in this dictation.  It may therefore contain typographical errors.  Nick Sterling MD      Again, thank you for allowing me to participate in the care of your patient.        Sincerely,        Nick Sterling MD

## 2023-07-18 NOTE — PATIENT INSTRUCTIONS
"Siri is good to see you again and I am sorry you have had some back pain recently but I am pretty sure the same thing we saw last time and just a little tune up today and resuming your home exercise every morning will probably take care of this.  Do the assessment and plan below for further details of our discussion today and let me know if I can be of service again.    Assessment     Siri Vyas  is a 52 year old y.o. female who presents today for follow-up regarding   Low back pain-chronic  Recurrent Pelvic Joint Dysfunction (right posterior innominate rotation)      Plan:  She has gotten away a little bit from her shotgun and broomstick technique which I would like her to resume on a daily basis and come back and see me if she is not happy.  Gradually resume activities as tolerated.        PELVIC JOINT SELF CORRECTION EXERCISES      It is best to do this first thing in the morning, and can be repeated once or twice during the day.    \"SHOTGUN\" TECHNIQUE:  This loosens up the front and back of the pelvis.  Do this before the Broomstick exercise.  Use on object such as a rectangular laundry basket.  Lie on your back with your knees bent, feet together and flat on the floor.    Spread your knees approximately 12-24 inches around the outside of an upright laundry basket.  Squeeze your knees together, breathing as you do this.    Concentrate on keeping your buttocks relaxed and on the ground.    A brief discomfort in the front of the pelvis and even a popping sound is normal.  Hold the squeeze for 3-5 seconds.    Relax for 3-5 seconds.    Repeat 2 more times.    Now reverse your knee position to the inside of the upside down laundry basket while still lying with your knees bent up, feet on the ground.  Pull your knees apart, breathing easy as you do this.  Hold the squeeze for 3-5 seconds.    Relax for 3-5 seconds.    Repeat 2 more times.    BROOMSTICK EXERCISE:  Lie on your back with your knees bent.  Slide a " broomstick or similar object above one knee, and below the opposite knee.  Firmly hold the stick with your hands will bringing your knees closed to your belly, preferably high enough that your back can rest flat on the ground.  Still holding the stick, scissors your legs against the stick, breathing as you do this.    (Push down to your foot with leg on top of the broomstick, and lift up to your chin with the leg below the broomstick).  Hold the squeeze for 3-5 seconds.    Relax for 3-5 seconds.    Repeat 2 more times.  Switch the position of the broomstick above the other knee, and below the first knee.  Repeat the exercise as above in this new position.

## 2023-07-18 NOTE — NURSING NOTE
"Reason For Visit:   Chief Complaint   Patient presents with     RECHECK     Left sided leg and back pain     Occupation: computer work  Currently working? Yes.  Work status?  Full time.     Sports: n  Activities: walking                /74   Pulse 84   Ht 1.613 m (5' 3.5\")   Wt 104.3 kg (230 lb)   BMI 40.10 kg/m        Allergies   Allergen Reactions     Dust Mites      No Clinical Screening - See Comments Other (See Comments)     No Known Drug Allergy      Seasonal Allergies        Current Outpatient Medications   Medication     acetaminophen (TYLENOL) 325 MG tablet     aspirin (ASA) 81 MG EC tablet     Cholecalciferol (VITAMIN D3) 25 MCG (1000 UT) CAPS     fexofenadine (ALLEGRA) 180 MG tablet     GLUCOSAMINE-CHONDROITIN PO     magnesium oxide 400 MG CAPS     Misc Natural Products (OSTEO BI-FLEX JOINT SHIELD PO)     Multiple Vitamin (MULTIVITAMINS PO)     Omega-3 Fatty Acids (OMEGA-3 FISH OIL PO)     PREBIOTIC PRODUCT PO     Probiotic Product (PROBIOTIC DAILY PO)     Current Facility-Administered Medications   Medication     betamethasone acet & sod phos (CELESTONE) injection 6 mg     betamethasone acet & sod phos (CELESTONE) injection 6 mg     lidocaine 1 % injection 4 mL     lidocaine 1 % injection 4 mL     lidocaine 1 % injection 5 mL     lidocaine 1 % injection 5 mL     methylPREDNISolone (DEPO-MEDROL) injection 80 mg     methylPREDNISolone (DEPO-MEDROL) injection 80 mg         Darla Severin-Brown, LPN  "

## 2024-03-11 ENCOUNTER — PATIENT OUTREACH (OUTPATIENT)
Dept: CARE COORDINATION | Facility: CLINIC | Age: 54
End: 2024-03-11
Payer: COMMERCIAL

## 2024-03-16 ENCOUNTER — TRANSFERRED RECORDS (OUTPATIENT)
Dept: MULTI SPECIALTY CLINIC | Facility: CLINIC | Age: 54
End: 2024-03-16

## 2024-04-08 ENCOUNTER — PATIENT OUTREACH (OUTPATIENT)
Dept: CARE COORDINATION | Facility: CLINIC | Age: 54
End: 2024-04-08
Payer: COMMERCIAL

## 2024-04-16 ENCOUNTER — NURSE TRIAGE (OUTPATIENT)
Dept: FAMILY MEDICINE | Facility: CLINIC | Age: 54
End: 2024-04-16
Payer: COMMERCIAL

## 2024-04-16 DIAGNOSIS — U07.1 INFECTION DUE TO 2019 NOVEL CORONAVIRUS: Primary | ICD-10-CM

## 2024-04-16 NOTE — TELEPHONE ENCOUNTER
COVID Positive/Requesting COVID treatment    Patient is positive for COVID and requesting treatment options.    Date of positive COVID test (PCR or at home)? 04/16/2024  Current COVID symptoms: cough,headache,body aches,sinus pressure  Date COVID symptoms began: 04/15/2024    Message should be routed to clinic RN pool. Best phone number to use for call back: [phone]

## 2024-04-17 NOTE — CONFIDENTIAL NOTE
RN COVID TREATMENT VISIT  04/17/24    The patient has been triaged and does not require a higher level of care.    Siri Vyas  53 year old  Current weight? 230 lb    Has the patient been seen by a primary care provider at an [facility] or [facility] within the past two years? Yes.   Have you been in close proximity to/do you have a known exposure to a person with a confirmed case of influenza? No.     General treatment eligibility:  Date of positive COVID test (PCR or at home)?  4/16/24    Are you or have you been hospitalized for this COVID-19 infection? No.   Have you received monoclonal antibodies or antiviral treatment for COVID-19 since this positive test? No.   Do you have any of the following conditions that place you at risk of being very sick from COVID-19?   - Age 50 years or older  Yes, patient has at least one high risk condition as noted above.     Current COVID symptoms:   - cough  - muscle or body aches  - headache  Yes. Patient has at least one symptom as selected.     How many days since symptoms started? 5 days or less. Established patient, 12 years or older weighing at least 88.2 lbs, who has symptoms that started in the past 5 days, has not been hospitalized nor received treatment already, and is at risk for being very sick from COVID-19.     Treatment eligibility by RN:  Are you currently pregnant or nursing? No  Do you have a clinically significant hypersensitivity to nirmatrelvir or ritonavir, or toxic epidermal necrolysis (TEN) or Morel-Dilip Syndrome? No  Do you have a history of hepatitis, any hepatic impairment on the Problem List (such as Child-Copeland Class C, cirrhosis, fatty liver disease, alcoholic liver disease), or was the last liver lab (hepatic panel, ALT, AST, ALK Phos, bilirubin) elevated in the past 6 months? No  Do you have any history of severe renal impairment (eGFR < 30mL/min)? No    Is patient eligible to continue? Yes, patient meets all  eligibility requirements for the RN COVID treatment (as denoted by all no responses above).     Current Outpatient Medications   Medication Sig Dispense Refill    acetaminophen (TYLENOL) 325 MG tablet Take 325-650 mg by mouth every 6 hours as needed      aspirin (ASA) 81 MG EC tablet Take 1 tablet (81 mg) by mouth daily 90 tablet 3    Cholecalciferol (VITAMIN D3) 25 MCG (1000 UT) CAPS Take 2,000 Units by mouth      fexofenadine (ALLEGRA) 180 MG tablet Take  by mouth. 1 TABLET DAILY FOR ALLERGIES 90 tablet 0    GLUCOSAMINE-CHONDROITIN PO       magnesium oxide 400 MG CAPS Take by mouth daily      Misc Natural Products (OSTEO BI-FLEX JOINT SHIELD PO) Take by mouth daily      Multiple Vitamin (MULTIVITAMINS PO)       Omega-3 Fatty Acids (OMEGA-3 FISH OIL PO)       PREBIOTIC PRODUCT PO Take by mouth daily      Probiotic Product (PROBIOTIC DAILY PO) Take by mouth daily         Medications from List 1 of the standing order (on medications that exclude the use of Paxlovid) that patient is taking: NONE. Is patient taking Ghada's Wort? No  Is patient taking Ghada's Wort or any meds from List 1? No.   Medications from List 2 of the standing order (on meds that provider needs to adjust) that patient is taking: NONE. Is patient on any of the meds from List 2? No.   Medications from List 3 of standing order (on meds that a RN needs to adjust) that patient is taking: NONE. Is patient on any meds from List 3? No.     Paxlovid has an approximate 90% reduction in hospitalization. Paxlovid can possibly cause altered sense of taste, diarrhea (loose, watery stools), high blood pressure, muscle aches.     Would patient like a Paxlovid prescription?   Yes.   Lab Results   Component Value Date    GFRESTIMATED >90 02/28/2020       Was last eGFR reduced? No, eGFR 60 or greater/ No Result on record. Patient can receive the normal renal function dose. Paxlovid Rx sent to [place] [facility]   [facility]    Temporary change to home medications:  None    All medication adjustments (holds, etc) were discussed with the patient and patient was asked to repeat back (teachback) their med adjustment.  Did patient understand med adjustment? No medication adjustments needed.         Reviewed the following instructions with the patient:    Paxlovid (nimatrelvir and ritonavir)    How it works  Two medicines (nirmatrelvir and ritonavir) are taken together. They stop the virus from growing. Less amount of virus is easier for your body to fight.    How to take  Medicine comes in a daily container with both medicine tablets. Take by mouth twice daily (once in the morning, once at night) for 5 days.  The number of tablets to take varies by patient.  Don't chew or break capsules. Swallow whole.    When to take  Take as soon as possible after positive COVID-19 test result, and within 5 days of your first symptoms.    Possible side effects  Can cause altered sense of taste, diarrhea (loose, watery stools), high blood pressure, muscle aches.    Fabi Castañeda RN

## 2024-05-14 NOTE — PROGRESS NOTES
"  Subjective:    Siri Vyas is a 53 year old female who presents today for follow-up regarding   Low back pain-chronic  Recurrent Pelvic Joint Dysfunction (right posterior innominate rotation)  Neurologically intact  Clinically no evidence of radiculopathy or nerve impingement  She is on a home program.  Follow-up as needed as of 7/18/2023.    PRIOR HISTORY from 12/20/2021:  Her left hip x-ray shows minimal DJD and no other abnormalities.  Her lumbar MRI is compared to a prior study from 2009 but the radiologist did not specifically indicate what is different.,  And no high-grade stenosis to explain left-sided symptoms is seen.  Minimal spondylosis for age.     Onset was about a year ago without a specific injury but she was working from home during the pandemic and sitting at a high top table on a high top stool with her legs often dangling.  That seemed to be what started all this and eventually she got her office chair with more proper ergonomics but it did not help her symptoms.  Chiropractic care has not really helped.  She is experiencing pain in the left SI area and a \"like a pinched nerve\" sensation in her left inguinal area the latter which is intermittent with the former is constant.  She also has intermittent tingling down the back of her left leg but that is not true numbness and not true weakness.  No red flags on review of systems     Treatment to Date: Methylprednisolone, gabapentin, Robaxin, chiropractic care, PT, OMT.     Updated 2/28/2022 HISTORY:    Previously for about a year her pain was at a constant 8-9/10.  Today it is a 3-4 and at worst it goes up to a 6 and she is quite pleased with her progress.  The quality the pain is less sharp and her function has improved dramatically.  Her physical therapist notes attainment of pelvic stability and she has been taught a good home program as well as proper lifting techniques.  We talked about what to expect in the future and what symptom changes " "would be expected if a Pelvic Joint Dysfunction recurs.  She still has some difficulty tolerating a left Tony's position     7/18/2023 HISTORY:    About 3 weeks ago without any particular inciting event, states his pain went from being manageable and her function was reasonably good to increasing pain in the back on the back of the thigh and calf on the left.  She has pain in the butt left SI area which is constant and her biggest concern.  Her leg symptoms are intermittent.  A prolonged 2 and half hour drive flared her up recently.    INTERIM HISTORY:    States he is doing all of her home exercises.  She has been walking 1.5-2 miles a day on a treadmill 5-7 times a week and has successfully lost about 25 pounds in 6 months.  Unfortunately she notably does not notice any significant improvement but feels like she is getting worse.  She has diffuse back pain.  She has pain mainly in the left leg sometimes in the right and sometimes into the thighs.  She has not fallen and there is no obvious weakness but sometimes 1 or both legs feels like \"they are going to collapse.  There is no sensory deficit in the legs or saddle area and no bowel or bladder dysfunction.  Back and pelvic symptoms constant and 80% versus leg symptoms intermittent and 20%.  Pelvic issues are primarily left-sided.    Past medical history is reviewed and is unchanged for any new medical diagnoses in the interim.    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  She is  with 4 children.  Her father lives with her.  Prior to the onset of the current condition, she was jogging and walking and was doing some 5K runs, playing basketball with her children ages 17-24.  She played midfield in soccer and also basketball in high school and she weighed 100 pounds less in those days.  She has been able to resume playing " basketball with her children and walks on the treadmill 2 to 3 miles once or twice a week.       Objective:    IMAGING:   Images and report reviewed    XR LUMBAR FLEX/EXT 2/3 VIEWS, XR LUMBAR SPINE 2/3 VIEWS: 05/16/2024                                                                  IMPRESSION: Lumbar spine alignment is within normal limits. No significant change in spinal alignment on flexion or extension views. No obvious loss of vertebral body height. Mild degenerative endplate changes and loss of disc height in the lower lumbar   (OM-no major anterior posterior instability but there does appear to be an increase in the lumbosacral disc angle with extension compared to flexion but no change in the neural foramen.)     MR LUMBAR SPINE W/O CONTRAST 12/21/2021     Comparison: MRI 11/11/2009     L4-5: Circumferential disc bulge. Bilateral facet arthropathy.  Ligamentum flavum thickening. Minimal narrowing of the right lateral  recess. Mild bilateral neuroforaminal stenosis. No spinal canal  stenosis. Progressed.      L5-S1: Disc bulge. Left facet arthropathy. Mild left neural foraminal  narrowing. No spinal canal stenosis. Progressed.                                                              Impression: Multilevel lumbar spondylosis, most pronounced at L4-5  with mild neural foraminal stenosis bilaterally. Additionally, there  is mild left neural foraminal stenosis at L5-S1. No evidence for  high-grade spinal canal or neural foraminal stenosis.      LEFT HIP TWO TO THREE VIEWS   12/6/2021                                                                   IMPRESSION:  Anatomic alignment left hip. No acute displaced left hip  fracture. Mild left hip osteoarthritis. 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 Bilateral tubal occluder devices project over the  superior pelvis. Symmetric SI joints.    EXAMINATION:    CONSTITUTIONAL:  Vital signs as above.  No acute distress.  The patient is well nourished and well  groomed.    PSYCHIATRIC:  The patient is awake, alert, oriented to person, place and time.  The patient is answering questions appropriately with clear speech.  Normal affect.  Able to follow commands  MUSCULOSKELETAL:  Gait is non-antalgic.  The patient is able to heel and toe walk without any difficulty.   Squat and rise normal.   .  Back ROM: Full with more pain on extension and sidebending then with flexion.  Sidebending to the left with extension is much more painful than sidebending to the right with extension     NEUROLOGICAL:    Motor:  L1-S1 myotomes 5/5     Sensation to light touch is intact in the bilateral lower extremities.    SLR negative    Pelvis: Neutral alignment.  Spring testing SI negative.  Spring testing lumbar negative.    Assessment     Siri Vyas  is a 53 year old y.o. female who presents today for follow-up regarding   Low back pain-chronic.  Suspect facet mediated left greater than right  Pelvic Joint Dysfunction-resolved with no significant improvement neurologically intact  Clinically no evidence of radiculopathy or nerve impingement  Lumbar spondylosis and her exam suggests that it may be facet mediated by her imaging is fairly unremarkable for DJD.  I doubt there is a pars defect but there may be some minor angular instability at L5-S1 with L5 minimal retrolisthesis      Plan:  Lumbar MRI reexamine afterwards and consider an RFA workup versus an HENOK depending on the findings.      70 minutes of time spent doing chart review, history and exam, documentation, counseling, education, coordination of care, and other activities as described above.      Advised patient to call or return early if symptoms worsen, or having problems controlling bladder and bowel function or worsening leg weakness.     Please note: Voice recognition software was used in this dictation.  It may therefore contain typographical errors.  Nick Sterling MD

## 2024-05-16 ENCOUNTER — ANCILLARY PROCEDURE (OUTPATIENT)
Dept: GENERAL RADIOLOGY | Facility: CLINIC | Age: 54
End: 2024-05-16
Attending: PREVENTIVE MEDICINE
Payer: COMMERCIAL

## 2024-05-16 ENCOUNTER — OFFICE VISIT (OUTPATIENT)
Dept: NEUROSURGERY | Facility: CLINIC | Age: 54
End: 2024-05-16
Payer: COMMERCIAL

## 2024-05-16 VITALS
HEART RATE: 76 BPM | WEIGHT: 198.9 LBS | BODY MASS INDEX: 33.96 KG/M2 | DIASTOLIC BLOOD PRESSURE: 77 MMHG | HEIGHT: 64 IN | SYSTOLIC BLOOD PRESSURE: 111 MMHG

## 2024-05-16 DIAGNOSIS — M43.10 ACQUIRED SPONDYLOLISTHESIS: ICD-10-CM

## 2024-05-16 DIAGNOSIS — M99.05 SOMATIC DYSFUNCTION OF PELVIS REGION: ICD-10-CM

## 2024-05-16 DIAGNOSIS — M54.50 CHRONIC BILATERAL LOW BACK PAIN, UNSPECIFIED WHETHER SCIATICA PRESENT: Primary | ICD-10-CM

## 2024-05-16 DIAGNOSIS — G89.29 CHRONIC BILATERAL LOW BACK PAIN, UNSPECIFIED WHETHER SCIATICA PRESENT: Primary | ICD-10-CM

## 2024-05-16 PROCEDURE — 72120 X-RAY BEND ONLY L-S SPINE: CPT | Performed by: RADIOLOGY

## 2024-05-16 PROCEDURE — 72100 X-RAY EXAM L-S SPINE 2/3 VWS: CPT | Performed by: RADIOLOGY

## 2024-05-16 PROCEDURE — 99215 OFFICE O/P EST HI 40 MIN: CPT | Performed by: PREVENTIVE MEDICINE

## 2024-05-16 PROCEDURE — 99417 PROLNG OP E/M EACH 15 MIN: CPT | Performed by: PREVENTIVE MEDICINE

## 2024-05-16 ASSESSMENT — PAIN SCALES - GENERAL: PAINLEVEL: EXTREME PAIN (8)

## 2024-05-16 NOTE — LETTER
"    5/16/2024         RE: Siri Vyas  [address]  [place] [address]        Dear Colleague,    Thank you for referring your patient, Siri Vyas, to the [facility] [place]. Please see a copy of my visit note below.      Subjective:    Siri Vyas is a 53 year old female who presents today for follow-up regarding   Low back pain-chronic  Recurrent Pelvic Joint Dysfunction (right posterior innominate rotation)  Neurologically intact  Clinically no evidence of radiculopathy or nerve impingement  She is on a home program.  Follow-up as needed as of 7/18/2023.    PRIOR HISTORY from 12/20/2021:  Her left hip x-ray shows minimal DJD and no other abnormalities.  Her lumbar MRI is compared to a prior study from 2009 but the radiologist did not specifically indicate what is different.,  And no high-grade stenosis to explain left-sided symptoms is seen.  Minimal spondylosis for age.     Onset was about a year ago without a specific injury but she was working from home during the pandemic and sitting at a high top table on a high top stool with her legs often dangling.  That seemed to be what started all this and eventually she got her office chair with more proper ergonomics but it did not help her symptoms.  Chiropractic care has not really helped.  She is experiencing pain in the left SI area and a \"like a pinched nerve\" sensation in her left inguinal area the latter which is intermittent with the former is constant.  She also has intermittent tingling down the back of her left leg but that is not true numbness and not true weakness.  No red flags on review of systems     Treatment to Date: Methylprednisolone, gabapentin, Robaxin, chiropractic care, PT, OMT.     Updated 2/28/2022 HISTORY:    Previously for about a year her pain was at a constant 8-9/10.  Today it is a 3-4 and at worst it goes up to a 6 and she is quite pleased with her progress.  The quality the pain is less sharp " "and her function has improved dramatically.  Her physical therapist notes attainment of pelvic stability and she has been taught a good home program as well as proper lifting techniques.  We talked about what to expect in the future and what symptom changes would be expected if a Pelvic Joint Dysfunction recurs.  She still has some difficulty tolerating a left Tony's position     7/18/2023 HISTORY:    About 3 weeks ago without any particular inciting event, states his pain went from being manageable and her function was reasonably good to increasing pain in the back on the back of the thigh and calf on the left.  She has pain in the butt left SI area which is constant and her biggest concern.  Her leg symptoms are intermittent.  A prolonged 2 and half hour drive flared her up recently.    INTERIM HISTORY:    States he is doing all of her home exercises.  She has been walking 1.5-2 miles a day on a treadmill 5-7 times a week and has successfully lost about 25 pounds in 6 months.  Unfortunately she notably does not notice any significant improvement but feels like she is getting worse.  She has diffuse back pain.  She has pain mainly in the left leg sometimes in the right and sometimes into the thighs.  She has not fallen and there is no obvious weakness but sometimes 1 or both legs feels like \"they are going to collapse.  There is no sensory deficit in the legs or saddle area and no bowel or bladder dysfunction.  Back and pelvic symptoms constant and 80% versus leg symptoms intermittent and 20%.  Pelvic issues are primarily left-sided.    Past medical history is reviewed and is unchanged for any new medical diagnoses in the interim.    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  She is  with 4 children.  Her father lives with her.  Prior to the onset of the current condition, she " was jogging and walking and was doing some 5K runs, playing basketball with her children ages 17-24.  She played midfield in soccer and also basketball in high school and she weighed 100 pounds less in those days.  She has been able to resume playing basketball with her children and walks on the treadmill 2 to 3 miles once or twice a week.       Objective:    IMAGING:   Images and report reviewed    XR LUMBAR FLEX/EXT 2/3 VIEWS, XR LUMBAR SPINE 2/3 VIEWS: 05/16/2024                                                                  IMPRESSION: Lumbar spine alignment is within normal limits. No significant change in spinal alignment on flexion or extension views. No obvious loss of vertebral body height. Mild degenerative endplate changes and loss of disc height in the lower lumbar   (OM-no major anterior posterior instability but there does appear to be an increase in the lumbosacral disc angle with extension compared to flexion but no change in the neural foramen.)     MR LUMBAR SPINE W/O CONTRAST 12/21/2021     Comparison: MRI 11/11/2009     L4-5: Circumferential disc bulge. Bilateral facet arthropathy.  Ligamentum flavum thickening. Minimal narrowing of the right lateral  recess. Mild bilateral neuroforaminal stenosis. No spinal canal  stenosis. Progressed.      L5-S1: Disc bulge. Left facet arthropathy. Mild left neural foraminal  narrowing. No spinal canal stenosis. Progressed.                                                              Impression: Multilevel lumbar spondylosis, most pronounced at L4-5  with mild neural foraminal stenosis bilaterally. Additionally, there  is mild left neural foraminal stenosis at L5-S1. No evidence for  high-grade spinal canal or neural foraminal stenosis.      LEFT HIP TWO TO THREE VIEWS   12/6/2021                                                                   IMPRESSION:  Anatomic alignment left hip. No acute displaced left hip  fracture. Mild left hip osteoarthritis.  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 Bilateral tubal occluder devices project over the  superior pelvis. Symmetric SI joints.    EXAMINATION:    CONSTITUTIONAL:  Vital signs as above.  No acute distress.  The patient is well nourished and well groomed.    PSYCHIATRIC:  The patient is awake, alert, oriented to person, place and time.  The patient is answering questions appropriately with clear speech.  Normal affect.  Able to follow commands  MUSCULOSKELETAL:  Gait is non-antalgic.  The patient is able to heel and toe walk without any difficulty.   Squat and rise normal.   .  Back ROM: Full with more pain on extension and sidebending then with flexion.  Sidebending to the left with extension is much more painful than sidebending to the right with extension     NEUROLOGICAL:    Motor:  L1-S1 myotomes 5/5     Sensation to light touch is intact in the bilateral lower extremities.    SLR negative    Pelvis: Neutral alignment.  Spring testing SI negative.  Spring testing lumbar negative.    Assessment     Siri Vyas  is a 53 year old y.o. female who presents today for follow-up regarding   Low back pain-chronic.  Suspect facet mediated left greater than right  Pelvic Joint Dysfunction-resolved with no significant improvement neurologically intact  Clinically no evidence of radiculopathy or nerve impingement  Lumbar spondylosis and her exam suggests that it may be facet mediated by her imaging is fairly unremarkable for DJD.  I doubt there is a pars defect but there may be some minor angular instability at L5-S1 with L5 minimal retrolisthesis      Plan:  Lumbar MRI reexamine afterwards and consider an RFA workup versus an HENOK depending on the findings.      70 minutes of time spent doing chart review, history and exam, documentation, counseling, education, coordination of care, and other activities as described above.      Advised patient to call or return early if symptoms worsen, or having problems  controlling bladder and bowel function or worsening leg weakness.     Please note: Voice recognition software was used in this dictation.  It may therefore contain typographical errors.  Nick Sterling MD      Again, thank you for allowing me to participate in the care of your patient.        Sincerely,        Nick Sterling MD

## 2024-05-16 NOTE — NURSING NOTE
"Reason For Visit:   Chief Complaint   Patient presents with    RECHECK     Low back pain       Occupation: computer work  Currently working? Yes.  Work status?  Full time.     Sports: n  Activities: walking           /77   Pulse 76   Ht 1.613 m (5' 3.5\")   Wt 90.2 kg (198 lb 14.4 oz)   BMI 34.68 kg/m        Allergies   Allergen Reactions    Dust Mites     No Clinical Screening - See Comments Other (See Comments)    No Known Drug Allergy     Seasonal Allergies        Current Outpatient Medications   Medication Sig Dispense Refill    acetaminophen (TYLENOL) 325 MG tablet Take 325-650 mg by mouth every 6 hours as needed      aspirin (ASA) 81 MG EC tablet Take 1 tablet (81 mg) by mouth daily 90 tablet 3    Cholecalciferol (VITAMIN D3) 25 MCG (1000 UT) CAPS Take 2,000 Units by mouth      fexofenadine (ALLEGRA) 180 MG tablet Take  by mouth. 1 TABLET DAILY FOR ALLERGIES 90 tablet 0    GLUCOSAMINE-CHONDROITIN PO       magnesium oxide 400 MG CAPS Take by mouth daily      Misc Natural Products (OSTEO BI-FLEX JOINT SHIELD PO) Take by mouth daily      Multiple Vitamin (MULTIVITAMINS PO)       Omega-3 Fatty Acids (OMEGA-3 FISH OIL PO)       PREBIOTIC PRODUCT PO Take by mouth daily (Patient not taking: Reported on 5/16/2024)      Probiotic Product (PROBIOTIC DAILY PO) Take by mouth daily (Patient not taking: Reported on 5/16/2024)       Current Facility-Administered Medications   Medication Dose Route Frequency Provider Last Rate Last Admin    betamethasone acet & sod phos (CELESTONE) injection 6 mg  6 mg   Jeffery Mcgee MD   6 mg at 09/25/18 1630    betamethasone acet & sod phos (CELESTONE) injection 6 mg  6 mg   Jeffery Mcgee MD   6 mg at 09/25/18 1630    lidocaine 1 % injection 4 mL  4 mL   Jeffery Mcgee MD   4 mL at 09/25/18 1630    lidocaine 1 % injection 4 mL  4 mL   Jeffery Mcgee MD   4 mL at 09/25/18 1630    lidocaine 1 % injection 5 mL  5 mL   Hernesto Cunningham MD   5 mL at 02/28/19 " 1637    lidocaine 1 % injection 5 mL  5 mL   Hernesto Cunningham MD   5 mL at 02/28/19 1637    methylPREDNISolone (DEPO-MEDROL) injection 80 mg  80 mg   Hernesto Cunningham MD   80 mg at 02/28/19 1637    methylPREDNISolone (DEPO-MEDROL) injection 80 mg  80 mg   Hernesto Cunningham MD   80 mg at 02/28/19 1637         Darla Severin-Brown, LPN

## 2024-05-16 NOTE — PATIENT INSTRUCTIONS
Siri I am sorry you are still dealing with this back pain.  It is not exactly clear what is coming from.  Come back and see me after the MRI and we will put our heads together for a plan.

## 2024-05-29 NOTE — PROGRESS NOTES
"  Subjective:    Siri Vyas is a 53 year old female who presents today for follow-up regarding   Low back pain-chronic.  Suspect facet mediated left greater than right  Pelvic Joint Dysfunction-resolved with no significant improvement neurologically intact  Clinically no evidence of radiculopathy or nerve impingement  Lumbar spondylosis and her exam suggests that it may be facet mediated but her imaging is fairly unremarkable for DJD.  I doubt there is a pars defect but there may be some minor angular instability at L5-S1 with L5 minimal retrolisthesis  Lumbar MRI and follow-up.  Consider RFA workup versus HENOK.    PRIOR HISTORY from 12/20/2021:  Her left hip x-ray shows minimal DJD and no other abnormalities.  Her lumbar MRI is compared to a prior study from 2009 but the radiologist did not specifically indicate what is different.,  And no high-grade stenosis to explain left-sided symptoms is seen.  Minimal spondylosis for age.     Onset was about a year ago without a specific injury but she was working from home during the pandemic and sitting at a high top table on a high top stool with her legs often dangling.  That seemed to be what started all this and eventually she got her office chair with more proper ergonomics but it did not help her symptoms.  Chiropractic care has not really helped.  She is experiencing pain in the left SI area and a \"like a pinched nerve\" sensation in her left inguinal area the latter which is intermittent with the former is constant.  She also has intermittent tingling down the back of her left leg but that is not true numbness and not true weakness.  No red flags on review of systems     Treatment to Date: Methylprednisolone, gabapentin, Robaxin, chiropractic care, PT, OMT.     Updated 2/28/2022 HISTORY:    Previously for about a year her pain was at a constant 8-9/10.  Today it is a 3-4 and at worst it goes up to a 6 and she is quite pleased with her progress.  The quality the " "pain is less sharp and her function has improved dramatically.  Her physical therapist notes attainment of pelvic stability and she has been taught a good home program as well as proper lifting techniques.  We talked about what to expect in the future and what symptom changes would be expected if a Pelvic Joint Dysfunction recurs.  She still has some difficulty tolerating a left Tony's position     7/18/2023 HISTORY:    About 3 weeks ago without any particular inciting event, states his pain went from being manageable and her function was reasonably good to increasing pain in the back on the back of the thigh and calf on the left.  She has pain in the butt left SI area which is constant and her biggest concern.  Her leg symptoms are intermittent.  A prolonged 2 and half hour drive flared her up recently.     5/16/2024 HISTORY:    States he is doing all of her home exercises.  She has been walking 1.5-2 miles a day on a treadmill 5-7 times a week and has successfully lost about 25 pounds in 6 months.  Unfortunately she notably does not notice any significant improvement but feels like she is getting worse.  She has diffuse back pain.  She has pain mainly in the left leg sometimes in the right and sometimes into the thighs.  She has not fallen and there is no obvious weakness but sometimes 1 or both legs feels like \"they are going to collapse.  There is no sensory deficit in the legs or saddle area and no bowel or bladder dysfunction.  Back and pelvic symptoms constant and 80% versus leg symptoms intermittent and 20%.  Pelvic issues are primarily left-sided.    INTERIM HISTORY:    States he has not noticed any change in the quality severity location duration or timing of symptoms.  It is primarily in her back fairly generalized posterior pelvis and when it gets really bad she will get a little achiness in her left glut and down the back of the left thigh but not past the knee.  She continues activity and exercise " and her ongoing attempts to lose weight.      Past medical history is reviewed and is unchanged for any new medical diagnoses in the interim.    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  She is  with 4 children.  Her father lives with her.  Prior to the onset of the current condition, she was jogging and walking and was doing some 5K runs, playing basketball with her children ages 17-24.  She played midfield in soccer and also basketball in high school and she weighed 100 pounds less in those days.  She has been able to resume playing basketball with her children and walks on the treadmill 2 to 3 miles once or twice a week.    Objective:    IMAGING:   Images and report reviewed    MRI LUMBAR SPINE WITHOUT CONTRAST   6/4/2024     COMPARISON: Lumbar spine MRI 12/21/2021.      Bilateral sacroiliac joint degenerative change. Small  areas of T2 hyperintense signal within bilateral kidneys, incompletely  characterized, presumably benign.      L2-L3:  Mild disc height loss. Minimal bulge. Mild bilateral facet  arthropathy. No foraminal or spinal canal stenosis.       L3-L4:  Mild disc height loss. Minimal bulge. Mild bilateral facet  arthropathy. No foraminal or spinal canal stenosis.       L4-L5:  Mild disc height loss. Disc bulge, asymmetric to the right  with shallow right foraminal component, similar compared to the prior.  Moderate bilateral facet arthropathy. Mild right foraminal stenosis.  No left foraminal stenosis. Mild spinal canal stenosis.       L5-S1:  Mild disc height loss. Minimal bulge. Mild right and moderate  left facet arthropathy. No right foraminal stenosis. Mild left  foraminal stenosis. No spinal canal stenosis.                                                                  IMPRESSION:     1. Mild degenerative change as detailed.  2. No high-grade stenoses.      XR LUMBAR FLEX/EXT 2/3 VIEWS, XR LUMBAR SPINE 2/3 VIEWS: 05/16/2024                                                                  IMPRESSION: Lumbar spine alignment is within normal limits. No significant change in spinal alignment on flexion or extension views. No obvious loss of vertebral body height. Mild degenerative endplate changes and loss of disc height in the lower lumbar   (OM-no major anterior posterior instability but there does appear to be an increase in the lumbosacral disc angle with extension compared to flexion but no change in the neural foramen.)        LEFT HIP TWO TO THREE VIEWS   12/6/2021                                                                   IMPRESSION:  Anatomic alignment left hip. No acute displaced left hip  fracture. Mild left hip osteoarthritis. 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 Bilateral tubal occluder devices project over the  superior pelvis. Symmetric SI joints.      EXAMINATION:    CONSTITUTIONAL:  Vital signs as above.  No acute distress.  The patient is well nourished and well groomed.    PSYCHIATRIC:  The patient is awake, alert, oriented to person, place and time.  The patient is answering questions appropriately with clear speech.  Normal affect.  Able to follow commands  MUSCULOSKELETAL:  Gait is non-antalgic.  The patient is able to heel and toe walk without any difficulty.   Squat and rise normal.   .  Back ROM: Full and painless in flexion.  Full and painful in extension and worse if I rotate to the right and worse if I rotate to the left although it does reproduce some of her left leg symptoms briefly.     NEUROLOGICAL:    Motor:  L1-S1 myotomes 5/5     Sensation to light touch is intact in the bilateral lower extremities.    SLR negative        Assessment     Siri Vyas  is a 53 year old y.o. female who presents today for follow-up regarding   Low back pain-chronic.  Suspect facet mediated left greater than right  Pelvic Joint  Dysfunction-resolved with no significant improvement neurologically intact  Clinically no evidence of radiculopathy or nerve impingement  Lumbar spondylosis and her exam suggests that it may be facet mediated but her imaging is fairly unremarkable for DJD.  I doubt there is a pars defect but there may be some minor angular instability at L5-S1 with L5 minimal retrolisthesis      Plan:  Follow-up with PCP regarding the renal lesion-Warned.  I did offer a MedX program but she declined that having been through PT and she is tired of dealing with the pain.  RFA workup bilateral L4-5, L5-S1.  If the RFA is successful, she can follow-up directly with Dr. Macdonald for repeat procedures as needed.      30 minutes of time spent doing chart review, history and exam, documentation, counseling, education, coordination of care, and other activities as described above.        Advised patient to call or return early if symptoms worsen, or having problems controlling bladder and bowel function or worsening leg weakness.     Please note: Voice recognition software was used in this dictation.  It may therefore contain typographical errors.  Nick Sterling MD

## 2024-06-04 ENCOUNTER — ANCILLARY PROCEDURE (OUTPATIENT)
Dept: MRI IMAGING | Facility: CLINIC | Age: 54
End: 2024-06-04
Attending: PREVENTIVE MEDICINE
Payer: COMMERCIAL

## 2024-06-04 DIAGNOSIS — M54.50 CHRONIC BILATERAL LOW BACK PAIN, UNSPECIFIED WHETHER SCIATICA PRESENT: ICD-10-CM

## 2024-06-04 DIAGNOSIS — G89.29 CHRONIC BILATERAL LOW BACK PAIN, UNSPECIFIED WHETHER SCIATICA PRESENT: ICD-10-CM

## 2024-06-04 DIAGNOSIS — M99.05 SOMATIC DYSFUNCTION OF PELVIS REGION: ICD-10-CM

## 2024-06-04 PROCEDURE — 72148 MRI LUMBAR SPINE W/O DYE: CPT | Mod: TC | Performed by: RADIOLOGY

## 2024-06-05 ENCOUNTER — OFFICE VISIT (OUTPATIENT)
Dept: NEUROSURGERY | Facility: CLINIC | Age: 54
End: 2024-06-05
Payer: COMMERCIAL

## 2024-06-05 ENCOUNTER — TELEPHONE (OUTPATIENT)
Dept: FAMILY MEDICINE | Facility: CLINIC | Age: 54
End: 2024-06-05

## 2024-06-05 VITALS
BODY MASS INDEX: 33.8 KG/M2 | HEIGHT: 64 IN | DIASTOLIC BLOOD PRESSURE: 66 MMHG | WEIGHT: 198 LBS | HEART RATE: 80 BPM | SYSTOLIC BLOOD PRESSURE: 113 MMHG

## 2024-06-05 DIAGNOSIS — M47.896 OTHER SPONDYLOSIS, LUMBAR REGION: Primary | ICD-10-CM

## 2024-06-05 DIAGNOSIS — M43.10 ACQUIRED SPONDYLOLISTHESIS: ICD-10-CM

## 2024-06-05 DIAGNOSIS — M54.50 CHRONIC BILATERAL LOW BACK PAIN, UNSPECIFIED WHETHER SCIATICA PRESENT: ICD-10-CM

## 2024-06-05 DIAGNOSIS — M99.05 SOMATIC DYSFUNCTION OF PELVIS REGION: ICD-10-CM

## 2024-06-05 DIAGNOSIS — G89.29 CHRONIC BILATERAL LOW BACK PAIN, UNSPECIFIED WHETHER SCIATICA PRESENT: ICD-10-CM

## 2024-06-05 PROCEDURE — 99214 OFFICE O/P EST MOD 30 MIN: CPT | Performed by: PREVENTIVE MEDICINE

## 2024-06-05 ASSESSMENT — PAIN SCALES - GENERAL: PAINLEVEL: SEVERE PAIN (7)

## 2024-06-05 NOTE — LETTER
"6/5/2024      Siri Vyas  [address]  [facility] [address]      Dear Colleague,    Thank you for referring your patient, Siri Vyas, to the [facility] [place]. Please see a copy of my visit note below.      Subjective:    Siri Vyas is a 53 year old female who presents today for follow-up regarding   Low back pain-chronic.  Suspect facet mediated left greater than right  Pelvic Joint Dysfunction-resolved with no significant improvement neurologically intact  Clinically no evidence of radiculopathy or nerve impingement  Lumbar spondylosis and her exam suggests that it may be facet mediated but her imaging is fairly unremarkable for DJD.  I doubt there is a pars defect but there may be some minor angular instability at L5-S1 with L5 minimal retrolisthesis  Lumbar MRI and follow-up.  Consider RFA workup versus HENOK.    PRIOR HISTORY from 12/20/2021:  Her left hip x-ray shows minimal DJD and no other abnormalities.  Her lumbar MRI is compared to a prior study from 2009 but the radiologist did not specifically indicate what is different.,  And no high-grade stenosis to explain left-sided symptoms is seen.  Minimal spondylosis for age.     Onset was about a year ago without a specific injury but she was working from home during the pandemic and sitting at a high top table on a high top stool with her legs often dangling.  That seemed to be what started all this and eventually she got her office chair with more proper ergonomics but it did not help her symptoms.  Chiropractic care has not really helped.  She is experiencing pain in the left SI area and a \"like a pinched nerve\" sensation in her left inguinal area the latter which is intermittent with the former is constant.  She also has intermittent tingling down the back of her left leg but that is not true numbness and not true weakness.  No red flags on review of systems     Treatment to Date: Methylprednisolone, gabapentin, " "Robaxin, chiropractic care, PT, OMT.     Updated 2/28/2022 HISTORY:    Previously for about a year her pain was at a constant 8-9/10.  Today it is a 3-4 and at worst it goes up to a 6 and she is quite pleased with her progress.  The quality the pain is less sharp and her function has improved dramatically.  Her physical therapist notes attainment of pelvic stability and she has been taught a good home program as well as proper lifting techniques.  We talked about what to expect in the future and what symptom changes would be expected if a Pelvic Joint Dysfunction recurs.  She still has some difficulty tolerating a left Tony's position     7/18/2023 HISTORY:    About 3 weeks ago without any particular inciting event, states his pain went from being manageable and her function was reasonably good to increasing pain in the back on the back of the thigh and calf on the left.  She has pain in the butt left SI area which is constant and her biggest concern.  Her leg symptoms are intermittent.  A prolonged 2 and half hour drive flared her up recently.     5/16/2024 HISTORY:    States he is doing all of her home exercises.  She has been walking 1.5-2 miles a day on a treadmill 5-7 times a week and has successfully lost about 25 pounds in 6 months.  Unfortunately she notably does not notice any significant improvement but feels like she is getting worse.  She has diffuse back pain.  She has pain mainly in the left leg sometimes in the right and sometimes into the thighs.  She has not fallen and there is no obvious weakness but sometimes 1 or both legs feels like \"they are going to collapse.  There is no sensory deficit in the legs or saddle area and no bowel or bladder dysfunction.  Back and pelvic symptoms constant and 80% versus leg symptoms intermittent and 20%.  Pelvic issues are primarily left-sided.    INTERIM HISTORY:    States he has not noticed any change in the quality severity location duration or timing of " symptoms.  It is primarily in her back fairly generalized posterior pelvis and when it gets really bad she will get a little achiness in her left glut and down the back of the left thigh but not past the knee.  She continues activity and exercise and her ongoing attempts to lose weight.      Past medical history is reviewed and is unchanged for any new medical diagnoses in the interim.    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  She is  with 4 children.  Her father lives with her.  Prior to the onset of the current condition, she was jogging and walking and was doing some 5K runs, playing basketball with her children ages 17-24.  She played midfield in soccer and also basketball in high school and she weighed 100 pounds less in those days.  She has been able to resume playing basketball with her children and walks on the treadmill 2 to 3 miles once or twice a week.    Objective:    IMAGING:   Images and report reviewed    MRI LUMBAR SPINE WITHOUT CONTRAST   6/4/2024     COMPARISON: Lumbar spine MRI 12/21/2021.      Bilateral sacroiliac joint degenerative change. Small  areas of T2 hyperintense signal within bilateral kidneys, incompletely  characterized, presumably benign.      L2-L3:  Mild disc height loss. Minimal bulge. Mild bilateral facet  arthropathy. No foraminal or spinal canal stenosis.       L3-L4:  Mild disc height loss. Minimal bulge. Mild bilateral facet  arthropathy. No foraminal or spinal canal stenosis.       L4-L5:  Mild disc height loss. Disc bulge, asymmetric to the right  with shallow right foraminal component, similar compared to the prior.  Moderate bilateral facet arthropathy. Mild right foraminal stenosis.  No left foraminal stenosis. Mild spinal canal stenosis.       L5-S1:  Mild disc height loss. Minimal bulge. Mild right and moderate  left facet  arthropathy. No right foraminal stenosis. Mild left  foraminal stenosis. No spinal canal stenosis.                                                                  IMPRESSION:     1. Mild degenerative change as detailed.  2. No high-grade stenoses.     XR LUMBAR FLEX/EXT 2/3 VIEWS, XR LUMBAR SPINE 2/3 VIEWS: 05/16/2024                                                                  IMPRESSION: Lumbar spine alignment is within normal limits. No significant change in spinal alignment on flexion or extension views. No obvious loss of vertebral body height. Mild degenerative endplate changes and loss of disc height in the lower lumbar   (OM-no major anterior posterior instability but there does appear to be an increase in the lumbosacral disc angle with extension compared to flexion but no change in the neural foramen.)        LEFT HIP TWO TO THREE VIEWS   12/6/2021                                                                   IMPRESSION:  Anatomic alignment left hip. No acute displaced left hip  fracture. Mild left hip osteoarthritis. 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 Bilateral tubal occluder devices project over the  superior pelvis. Symmetric SI joints.      EXAMINATION:    CONSTITUTIONAL:  Vital signs as above.  No acute distress.  The patient is well nourished and well groomed.    PSYCHIATRIC:  The patient is awake, alert, oriented to person, place and time.  The patient is answering questions appropriately with clear speech.  Normal affect.  Able to follow commands  MUSCULOSKELETAL:  Gait is non-antalgic.  The patient is able to heel and toe walk without any difficulty.   Squat and rise normal.   .  Back ROM: Full and painless in flexion.  Full and painful in extension and worse if I rotate to the right and worse if I rotate to the left although it does reproduce some of her left leg symptoms briefly.     NEUROLOGICAL:    Motor:  L1-S1 myotomes 5/5     Sensation to light touch is intact in the  bilateral lower extremities.    SLR negative        Assessment     Siri Vyas  is a 53 year old y.o. female who presents today for follow-up regarding   Low back pain-chronic.  Suspect facet mediated left greater than right  Pelvic Joint Dysfunction-resolved with no significant improvement neurologically intact  Clinically no evidence of radiculopathy or nerve impingement  Lumbar spondylosis and her exam suggests that it may be facet mediated but her imaging is fairly unremarkable for DJD.  I doubt there is a pars defect but there may be some minor angular instability at L5-S1 with L5 minimal retrolisthesis      Plan:  Follow-up with PCP regarding the renal lesion-Warned.  I did offer a MedX program but she declined that having been through PT and she is tired of dealing with the pain.  RFA workup bilateral L4-5, L5-S1.  If the RFA is successful, she can follow-up directly with Dr. Macdonald for repeat procedures as needed.      30 minutes of time spent doing chart review, history and exam, documentation, counseling, education, coordination of care, and other activities as described above.        Advised patient to call or return early if symptoms worsen, or having problems controlling bladder and bowel function or worsening leg weakness.     Please note: Voice recognition software was used in this dictation.  It may therefore contain typographical errors.  Nick Sterling MD      Again, thank you for allowing me to participate in the care of your patient.        Sincerely,        Nick Sterling MD

## 2024-06-05 NOTE — NURSING NOTE
"Reason For Visit:   Chief Complaint   Patient presents with    RECHECK     MRI results         Occupation: computer work  Currently working? Yes.  Work status?  Full time.     Sports: n  Activities: walking           /66   Pulse 80   Ht 1.613 m (5' 3.5\")   Wt 89.8 kg (198 lb)   BMI 34.52 kg/m        Allergies   Allergen Reactions    Dust Mites     No Clinical Screening - See Comments Other (See Comments)    No Known Drug Allergy     Seasonal Allergies        Current Outpatient Medications   Medication Sig Dispense Refill    acetaminophen (TYLENOL) 325 MG tablet Take 325-650 mg by mouth every 6 hours as needed      aspirin (ASA) 81 MG EC tablet Take 1 tablet (81 mg) by mouth daily 90 tablet 3    Cholecalciferol (VITAMIN D3) 25 MCG (1000 UT) CAPS Take 2,000 Units by mouth      fexofenadine (ALLEGRA) 180 MG tablet Take  by mouth. 1 TABLET DAILY FOR ALLERGIES 90 tablet 0    GLUCOSAMINE-CHONDROITIN PO       magnesium oxide 400 MG CAPS Take by mouth daily      Misc Natural Products (OSTEO BI-FLEX JOINT SHIELD PO) Take by mouth daily      Multiple Vitamin (MULTIVITAMINS PO)       Omega-3 Fatty Acids (OMEGA-3 FISH OIL PO)       PREBIOTIC PRODUCT PO Take by mouth daily      Probiotic Product (PROBIOTIC DAILY PO) Take by mouth daily       Current Facility-Administered Medications   Medication Dose Route Frequency Provider Last Rate Last Admin    betamethasone acet & sod phos (CELESTONE) injection 6 mg  6 mg   Jeffery Mcgee MD   6 mg at 09/25/18 1630    betamethasone acet & sod phos (CELESTONE) injection 6 mg  6 mg   Jeffery Mcgee MD   6 mg at 09/25/18 1630    lidocaine 1 % injection 4 mL  4 mL   Jeffery Mcgee MD   4 mL at 09/25/18 1630    lidocaine 1 % injection 4 mL  4 mL   Jeffery Mcgee MD   4 mL at 09/25/18 1630    lidocaine 1 % injection 5 mL  5 mL   Hernesto Cunningham MD   5 mL at 02/28/19 1637    lidocaine 1 % injection 5 mL  5 mL   Hernesto Cunningham MD   5 mL at 02/28/19 1637    " methylPREDNISolone (DEPO-MEDROL) injection 80 mg  80 mg   Hernesto Cunningham MD   80 mg at 02/28/19 1637    methylPREDNISolone (DEPO-MEDROL) injection 80 mg  80 mg   Hernesto Cunningham MD   80 mg at 02/28/19 1637         Darla Severin-Brown, LPN

## 2024-06-05 NOTE — PATIENT INSTRUCTIONS
Siri, I am almost certain that the facet arthritis is what is causing your pain so we will go through the RFA workup that we talked about as described in the paragraph below.  If you have a good therapeutic response to the RFA and the nerve grows back to his call Dr. Macdonald and he can repeat it and I do not have to be the middle man.  If this does not work, I probably would then recommend a MedX strengthening program to see if that helps.  I do not think you have pinched nerves causing your symptoms.  See the assessment and plan below for further details of our discussion today and I will keep my fingers crossed for you.    Assessment     Siri Vyas  is a 53 year old y.o. female who presents today for follow-up regarding   Low back pain-chronic.  Suspect facet mediated left greater than right  Pelvic Joint Dysfunction-resolved with no significant improvement neurologically intact  Clinically no evidence of radiculopathy or nerve impingement  Lumbar spondylosis and her exam suggests that it may be facet mediated but her imaging is fairly unremarkable for DJD.  I doubt there is a pars defect but there may be some minor angular instability at L5-S1 with L5 minimal retrolisthesis      Plan:  Follow-up with PCP regarding the renal lesion-Warned.  I did offer a MedX program but she declined that having been through PT and she is tired of dealing with the pain.  RFA workup bilateral L4-5, L5-S1    Description of the RFA procedure:  The pathway from diagnostic medial branch blocks (anesthetic injection) to radiofrequency denervation/ablation (burning the sensory nerve) was discussed in detail with the patient today.  Risks and benefits were discussed and all questions were answered.  The patient states understanding and wishes to proceed.  There are no contraindications.  The patient understands they will have 2 diagnostic medial branch blocks; and after each block they will be required to keep a pain diary recording  their pain scores approximately every hour until the pain has returned to baseline.  During the time after the block, the patient was instructed to perform activities which typically aggravates their pain.  The patient will contact the medical spine staff after each diagnostic block to assess the amount of benefit the patient received.  If the patient has a significantly positive response to each of these diagnostic blocks, they may move forward with the radiofrequency ablation procedure.

## 2024-06-05 NOTE — TELEPHONE ENCOUNTER
Reason for Call:  Appointment Request    Patient requesting this type of appt: Chronic Diease Management/Medication/Follow-Up    Requested provider: Chapito Coleman    Reason patient unable to be scheduled: Not within requested timeframe    When does patient want to be seen/preferred time: 3-7 days    Comments: after MRI there was a request for her to follow up with PCP due to findings on kidney    Could we send this information to you in [employer] or would you prefer to receive a phone call?:   Patient would like to be contacted via [employer]    Call taken on 6/5/2024 at 4:53 PM by Mel Hurtado

## 2024-06-11 ENCOUNTER — OFFICE VISIT (OUTPATIENT)
Dept: FAMILY MEDICINE | Facility: CLINIC | Age: 54
End: 2024-06-11
Payer: COMMERCIAL

## 2024-06-11 VITALS
OXYGEN SATURATION: 97 % | BODY MASS INDEX: 35.68 KG/M2 | DIASTOLIC BLOOD PRESSURE: 82 MMHG | RESPIRATION RATE: 16 BRPM | HEART RATE: 78 BPM | HEIGHT: 64 IN | TEMPERATURE: 99.5 F | WEIGHT: 209 LBS | SYSTOLIC BLOOD PRESSURE: 125 MMHG

## 2024-06-11 DIAGNOSIS — Z12.31 ENCOUNTER FOR SCREENING MAMMOGRAM FOR BREAST CANCER: ICD-10-CM

## 2024-06-11 DIAGNOSIS — N28.9 KIDNEY LESION, NATIVE, BILATERAL: Primary | ICD-10-CM

## 2024-06-11 DIAGNOSIS — M47.896 OTHER SPONDYLOSIS, LUMBAR REGION: Primary | ICD-10-CM

## 2024-06-11 DIAGNOSIS — R76.8 ELEVATED ANTINUCLEAR ANTIBODY (ANA) LEVEL: ICD-10-CM

## 2024-06-11 DIAGNOSIS — Z23 ENCOUNTER FOR IMMUNIZATION: ICD-10-CM

## 2024-06-11 PROCEDURE — 90715 TDAP VACCINE 7 YRS/> IM: CPT | Performed by: FAMILY MEDICINE

## 2024-06-11 PROCEDURE — 90471 IMMUNIZATION ADMIN: CPT | Performed by: FAMILY MEDICINE

## 2024-06-11 PROCEDURE — 99213 OFFICE O/P EST LOW 20 MIN: CPT | Mod: 25 | Performed by: FAMILY MEDICINE

## 2024-06-11 ASSESSMENT — PAIN SCALES - GENERAL: PAINLEVEL: SEVERE PAIN (7)

## 2024-06-11 NOTE — PROGRESS NOTES
Per Dr. Sterling in the spine health clinic, case request placed for  BILATERAL L4-5, L5-S1 medial branch block #1

## 2024-06-11 NOTE — NURSING NOTE
Prior to immunization administration, verified patients identity using patient s name and date of birth. Please see Immunization Activity for additional information.     Screening Questionnaire for Adult Immunization    Are you sick today?   No   Do you have allergies to medications, food, a vaccine component or latex?   Yes   Have you ever had a serious reaction after receiving a vaccination?   No   Do you have a long-term health problem with heart, lung, kidney, or metabolic disease (e.g., diabetes), asthma, a blood disorder, no spleen, complement component deficiency, a cochlear implant, or a spinal fluid leak?  Are you on long-term aspirin therapy?   No   Do you have cancer, leukemia, HIV/AIDS, or any other immune system problem?   No   Do you have a parent, brother, or sister with an immune system problem?   No   In the past 3 months, have you taken medications that affect  your immune system, such as prednisone, other steroids, or anticancer drugs; drugs for the treatment of rheumatoid arthritis, Crohn s disease, or psoriasis; or have you had radiation treatments?   No   Have you had a seizure, or a brain or other nervous system problem?   No   During the past year, have you received a transfusion of blood or blood    products, or been given immune (gamma) globulin or antiviral drug?   No   For women: Are you pregnant or is there a chance you could become       pregnant during the next month?   No   Have you received any vaccinations in the past 4 weeks?   No     Patient instructed to remain in clinic for 15 minutes afterwards, and to report any adverse reactions.     Screening performed by Shayy Villasenor MA on 6/11/2024 at 10:07 AM.

## 2024-06-11 NOTE — PROGRESS NOTES
"    Subjective   Siri is a 53 year old, presenting for the following health issues:  Results (MRI)      6/11/2024     9:40 AM   Additional Questions   Roomed by Shayy   Accompanied by self     Via the Health Maintenance questionnaire, the patient has reported the following services have been completed -Mammogram: [facility] 2024-03-16, this information has been sent to the abstraction team.     Patient has seen Neurosurgery and had MRI scan done with incidental findings of \"densities\" of both kidneys. Patient following up on this.        Review of Systems  Constitutional, HEENT, cardiovascular, pulmonary, GI, , musculoskeletal, neuro, skin, endocrine and psych systems are negative, except as otherwise noted.      Objective    /82 (BP Location: Left arm, Patient Position: Sitting, Cuff Size: Adult Large)   Pulse 78   Temp 99.5  F (37.5  C) (Tympanic)   Resp 16   Ht 1.613 m (5' 3.5\")   Wt 94.8 kg (209 lb)   LMP 05/17/2024 (Approximate)   SpO2 97%   BMI 36.44 kg/m    Body mass index is 36.44 kg/m .  Physical Exam   GENERAL: alert and no distress  NECK: no adenopathy, no asymmetry, masses, or scars  RESP: lungs clear to auscultation - no rales, rhonchi or wheezes  CV: regular rate and rhythm, normal S1 S2, no S3 or S4, no murmur, click or rub, no peripheral edema  ABDOMEN: soft, nontender, no hepatosplenomegaly, no masses and bowel sounds normal  MS: no gross musculoskeletal defects noted, no edema    A/P:    (N28.9) Kidney lesion, native, bilateral  (primary encounter diagnosis)  Comment:   Plan: US Renal Complete Non-Vascular        Will check renal ultrasound.    (Z12.31) Encounter for screening mammogram for breast cancer  Comment:   Plan: patient had this done early this year through [facility].    (Z23) Encounter for immunization  Comment:   Plan: TDAP 10-64Y (ADACEL,BOOSTRIX)                    Signed Electronically by: Chapito Coleman MD, MD    "

## 2024-06-12 ENCOUNTER — TELEPHONE (OUTPATIENT)
Dept: PHYSICAL MEDICINE AND REHAB | Facility: CLINIC | Age: 54
End: 2024-06-12

## 2024-06-12 ENCOUNTER — ANCILLARY PROCEDURE (OUTPATIENT)
Dept: ULTRASOUND IMAGING | Facility: CLINIC | Age: 54
End: 2024-06-12
Attending: FAMILY MEDICINE
Payer: COMMERCIAL

## 2024-06-12 ENCOUNTER — MYC MEDICAL ADVICE (OUTPATIENT)
Dept: FAMILY MEDICINE | Facility: CLINIC | Age: 54
End: 2024-06-12

## 2024-06-12 DIAGNOSIS — M25.50 ARTHRALGIA, UNSPECIFIED JOINT: Primary | ICD-10-CM

## 2024-06-12 DIAGNOSIS — N28.9 KIDNEY LESION, NATIVE, BILATERAL: ICD-10-CM

## 2024-06-12 PROBLEM — M47.896 OTHER SPONDYLOSIS, LUMBAR REGION: Status: ACTIVE | Noted: 2024-06-11

## 2024-06-12 PROCEDURE — 76770 US EXAM ABDO BACK WALL COMP: CPT | Performed by: RADIOLOGY

## 2024-06-12 NOTE — TELEPHONE ENCOUNTER
Called patient to schedule procedure with Dr. Macdonald    Date of Procedure: 7/1/24    Arrival time given: Yes: Arrival Time 8am       Procedure Location: [facility]     Verified Location with Patient:  Yes  Address provided to the patient     Pre-op H&P Required:  No: Local anesthesia        Post-Op/Follow Up Appt:  Not Indicated in Request      Informed patient they will need a  to drive them home:  Yes    Patients : Spouse     Patient is aware that pre-op RN from the procedure center will call 2-3 days prior to scheduled procedure to confirm arrival time and review any instructions:  Yes       Additional Comments: N/A        Brooke Geller MA on 6/12/2024 at 11:13 AM      P: [phone]*

## 2024-06-14 ENCOUNTER — LAB (OUTPATIENT)
Dept: LAB | Facility: CLINIC | Age: 54
End: 2024-06-14
Payer: COMMERCIAL

## 2024-06-14 DIAGNOSIS — M25.50 ARTHRALGIA, UNSPECIFIED JOINT: ICD-10-CM

## 2024-06-14 LAB — ERYTHROCYTE [SEDIMENTATION RATE] IN BLOOD BY WESTERGREN METHOD: 28 MM/HR (ref 0–30)

## 2024-06-14 PROCEDURE — 86038 ANTINUCLEAR ANTIBODIES: CPT

## 2024-06-14 PROCEDURE — 36415 COLL VENOUS BLD VENIPUNCTURE: CPT

## 2024-06-14 PROCEDURE — 86140 C-REACTIVE PROTEIN: CPT

## 2024-06-14 PROCEDURE — 86039 ANTINUCLEAR ANTIBODIES (ANA): CPT

## 2024-06-14 PROCEDURE — 86431 RHEUMATOID FACTOR QUANT: CPT

## 2024-06-14 PROCEDURE — 85652 RBC SED RATE AUTOMATED: CPT

## 2024-06-15 LAB
CRP SERPL-MCNC: 14.2 MG/L
RHEUMATOID FACT SERPL-ACNC: <10 IU/ML

## 2024-06-20 LAB
ANA PAT SER IF-IMP: ABNORMAL
ANA SER QL IF: POSITIVE
ANA TITR SER IF: ABNORMAL {TITER}

## 2024-06-25 ENCOUNTER — PATIENT OUTREACH (OUTPATIENT)
Dept: CARE COORDINATION | Facility: CLINIC | Age: 54
End: 2024-06-25
Payer: COMMERCIAL

## 2024-06-26 ENCOUNTER — TELEPHONE (OUTPATIENT)
Dept: FAMILY MEDICINE | Facility: CLINIC | Age: 54
End: 2024-06-26
Payer: COMMERCIAL

## 2024-06-26 NOTE — TELEPHONE ENCOUNTER
Patient Quality Outreach    Patient is due for the following:   Cervical Cancer Screening - PAP Needed  Physical Preventive Adult Physical      Topic Date Due    Hepatitis B Vaccine (1 of 3 - 19+ 3-dose series) Never done    Zoster (Shingles) Vaccine (1 of 2) Never done    COVID-19 Vaccine (5 - 2023-24 season) 09/01/2023       Next Steps:   Schedule a Adult Preventative    Type of outreach:    Sent [employer] message.      Questions for provider review:    None           Shayy Villasenor MA

## 2024-07-01 ENCOUNTER — HOSPITAL ENCOUNTER (OUTPATIENT)
Facility: AMBULATORY SURGERY CENTER | Age: 54
Discharge: HOME | End: 2024-07-01
Attending: PHYSICAL MEDICINE & REHABILITATION | Admitting: PHYSICAL MEDICINE & REHABILITATION
Payer: COMMERCIAL

## 2024-07-01 ENCOUNTER — ANCILLARY PROCEDURE (OUTPATIENT)
Dept: RADIOLOGY | Facility: AMBULATORY SURGERY CENTER | Age: 54
End: 2024-07-01
Attending: PHYSICAL MEDICINE & REHABILITATION
Payer: COMMERCIAL

## 2024-07-01 VITALS
HEART RATE: 76 BPM | RESPIRATION RATE: 17 BRPM | TEMPERATURE: 98 F | OXYGEN SATURATION: 95 % | SYSTOLIC BLOOD PRESSURE: 128 MMHG | DIASTOLIC BLOOD PRESSURE: 78 MMHG

## 2024-07-01 DIAGNOSIS — R52 PAIN: ICD-10-CM

## 2024-07-01 PROCEDURE — 64493 INJ PARAVERT F JNT L/S 1 LEV: CPT | Mod: 50,KX

## 2024-07-01 RX ORDER — IOPAMIDOL 408 MG/ML
INJECTION, SOLUTION INTRATHECAL DAILY PRN
Status: DISCONTINUED | OUTPATIENT
Start: 2024-07-01 | End: 2024-07-01 | Stop reason: HOSPADM

## 2024-07-01 NOTE — OP NOTE
PROCEDURE NOTE: 1st Diagnostic Lumbar Medial Branch Block    PROCEDURE DATE: 7/1/2024    PATIENT NAME: Siri Vyas  YOB: 1970    ATTENDING PHYSICIAN: Saima Macdonald MD   RESIDENT/FELLOW PHYSICIAN: None    PREOPERATIVE DIAGNOSIS:   Lumbar spondylosis  Lumbar facet pain  POSTOPERATIVE DIAGNOSIS: same    PROCEDURE PERFORMED: 1st diagnostic medial branch block at the Bilateral L4, L5, and Sacral ala to treat the Bilateral  L4-5 and L5-S1 facet joints (L3, L4 medial branch nerve blocks and L5 dorsal rami nerve blocks)      FLUOROSCOPY WAS USED.     INDICATIONS FOR PROCEDURE:   Siri Vyas is a 53 year old female with a clinical picture consistent with bilateral axial low back pain and lumbar facet arthropathy who presents for diagnostic medial branch block at the levels noted above to assess if there is a facetogenic component to her low back pain.     PROCEDURE AND FINDINGS:    Siri was greeted in the pre-procedure holding area. The risk, benefits and alternatives to the procedure were again reviewed with the patient and written informed consent was placed in the chart. An IV line was not placed.  A 500 mL bag of NS was not connected to the patient. She was taken to the procedure room and positioned prone on the fluoroscopy table.  Routine monitors were applied including blood pressure cuff, and pulse oximetry. Prior to the procedure a time out was completed, verifying correct patient, procedure, site, positioning, and implants and/or special equipment.     The skin was prepped and draped in the usual sterile fashion. An AP fluoroscopic view was obtained to identify the vertebral bodies, the transverse processes and the superior articulating processes at the L4, L5, and Sacral ala aforementioned levels on the bilateral side(s). The skin overlying the junction of the TP-SAP at the aforementioned levels was anesthetized using a 27-gauge 1-1/4 inch needle with 1% preservative-free lidocaine for a  total volume of 0.5 ml per level. Next, a 25-gauge 3 1/2 inch Quincke spinal needle was advanced under an AP fluoroscopic view to the junction of the superior articular process and transverse process(es). Correct needle position was then confirmed in the AP and lateral views.      After negative aspiration, 0.3 mls of Isovue-M contrast was injected at each site under live fluoroscopy; no vascular run off was identified and the contrast spread was adequate. At this point, after negative aspiration, 0.5mL of 0.5% Bupivacaine, was injected at each site.  The needle was then re-styletted removed. The needle insertion site was dressed appropriately.     Siri was taken to the recovery room where she was monitored for a brief period of time. She tolerated the procedure well and was discharged home in stable condition with post procedural instructions.    Before the procedure, she reported a pain score of 8/10.   After the procedure, she reported a pain score of 0/10.      Follow-up will be Determined after block sheet reviewed.    COMPLICATIONS: None    COMMENTS: None

## 2024-07-01 NOTE — DISCHARGE INSTRUCTIONS
Home Care Instructions after a Medial Branch Block      In a medial branch block, a local anesthetic (numbing medicine) is injected near the medial branch nerve. This stops the transmission of pain signals from the facet joint. If this reduces your pain and improves your mobility, it may tell the doctor which facet joint is causing the pain. This procedure is a diagnostic procedure and is typically short lasting, with intentions of doing a radiofrequency ablation. With this injection, a steroid to increase the longevity of the blocks effect is rarely used.    Activity  -You may resume most normal activity levels with the exception of strenuous activity. It is important for us to know if your pain with normal activity is relieved after this injection.  -DO NOT shower for 24 hours  -DO NOT remove bandaid for 24 hours    Pain  -You may experience soreness at the injection site for one or two days  -You may use an ice pack for 20 minutes every 2 hours for the first 24 hours  -You may use a heating pad after the first 24 hours  -You may use Tylenol (acetaminophen) every 4 hours or other pain medicines as     directed by your physician    You may experience numbness radiating into your legs or arms (depending on the procedure location). This numbness may last several hours. Until sensation returns to normal; please use caution in walking, climbing stairs, and stepping out of your vehicle, etc.    DID YOU RECEIVE STEROIDS TODAY?  No    Common side effects of steroids:  Not everyone will experience corticosteroid side effects. If side effects are experienced, they will gradually subside in the 7-10 day period following an injection. Most common side effects include:  -Flushed face and/or chest  -Feeling of warmth, particularly in the face but could be an overall feeling of warmth  -Increased blood sugar in diabetic patients  -Menstrual irregularities my occur. If taking hormone-based birth control an alternate method of  birth control is recommended  -Sleep disturbances and/or mood swings are possible  -Leg cramps      PLEASE KEEP TRACK OF YOUR SYMPTOMS AND NOTE YOUR IMPROVEMENT FOR YOUR DOCTOR.     Fill out the pain diary and fax it back to us. You do not need to call us if the pain diary was faxed. [phone] is the FAX number  If you do not have access to a fax machine, attach it to [employer].  You do not need to call us if the pain diary was attached to [employer].   If you cannot fax or send via [employer], then call our call center and request to speak with a nurse for follow up after a procedure       Please contact us if you have:  -Severe pain  -Fever more than 101.5 degrees Fahrenheit  -Signs of infection at the injection site (redness, swelling, or drainage)    FOR PAIN CENTER PATIENTS:  If you have questions, please contact the Pain Clinic at [phone] Option #1 between the hours of 7:00 am and 3:00 pm Monday through Friday. After office hours you can contact the on call provider by dialing [phone]. If you need immediate attention, we recommend that you go to a hospital emergency room or dial [phone]. Fax number is [phone]    FOR PM&R PATIENTS:  For patients seen by the PM and R service, please call [phone]. (Monday through Friday 8a-5p).  After business hours and weekends call [phone] and ask for the PM and R doctor on call). If you need to fax a pain diary to PM&R the fax number is [phone]. If you are unable to fax, uploading to [employer] is encouraged, then send to provider. If you have procedure scheduling questions please call [phone] Option #2.

## 2024-07-03 DIAGNOSIS — M47.896 OTHER SPONDYLOSIS, LUMBAR REGION: Primary | ICD-10-CM

## 2024-07-03 NOTE — PROGRESS NOTES
Pain diary reviewed.  Patient had 95% improvement following MBB #1.  Case request placed for second confirmatory block

## 2024-07-05 ENCOUNTER — TELEPHONE (OUTPATIENT)
Dept: PHYSICAL MEDICINE AND REHAB | Facility: CLINIC | Age: 54
End: 2024-07-05
Payer: COMMERCIAL

## 2024-07-05 NOTE — TELEPHONE ENCOUNTER
Called patient to schedule procedure with Dr. Macdonald    Date of Procedure: 7/31/24    Arrival time given: Yes: Arrival Time 7am       Procedure Location: [facility] Procedure [facility]     Verified Location with Patient:  Yes  Address provided to the patient     Pre-op H&P Required:  No: Local anesthesia        Post-Op/Follow Up Appt:  Not Indicated in Request      Informed patient they will need a  to drive them home:  Yes    Patients : Spouse     Patient is aware that pre-op RN from the procedure center will call 2-3 days prior to scheduled procedure to confirm arrival time and review any instructions:  Yes       Additional Comments: N/A        Brooke Geller MA on 7/5/2024 at 10:05 AM      P: [phone]*

## 2024-07-08 ENCOUNTER — LAB REQUISITION (OUTPATIENT)
Dept: LAB | Facility: CLINIC | Age: 54
End: 2024-07-08

## 2024-07-08 DIAGNOSIS — Z13.220 ENCOUNTER FOR SCREENING FOR LIPOID DISORDERS: ICD-10-CM

## 2024-07-08 DIAGNOSIS — Z13.29 ENCOUNTER FOR SCREENING FOR OTHER SUSPECTED ENDOCRINE DISORDER: ICD-10-CM

## 2024-07-08 DIAGNOSIS — Z13.1 ENCOUNTER FOR SCREENING FOR DIABETES MELLITUS: ICD-10-CM

## 2024-07-08 LAB
CHOLEST SERPL-MCNC: 228 MG/DL
FASTING STATUS PATIENT QL REPORTED: YES
HBA1C MFR BLD: 5.8 %
HDLC SERPL-MCNC: 65 MG/DL
LDLC SERPL CALC-MCNC: 118 MG/DL
NONHDLC SERPL-MCNC: 163 MG/DL
TRIGL SERPL-MCNC: 225 MG/DL
TSH SERPL DL<=0.005 MIU/L-ACNC: 3.22 UIU/ML (ref 0.3–4.2)

## 2024-07-08 PROCEDURE — 84443 ASSAY THYROID STIM HORMONE: CPT | Performed by: OBSTETRICS & GYNECOLOGY

## 2024-07-08 PROCEDURE — 80061 LIPID PANEL: CPT | Performed by: OBSTETRICS & GYNECOLOGY

## 2024-07-08 PROCEDURE — 83036 HEMOGLOBIN GLYCOSYLATED A1C: CPT | Performed by: OBSTETRICS & GYNECOLOGY

## 2024-07-23 ENCOUNTER — VIRTUAL VISIT (OUTPATIENT)
Dept: FAMILY MEDICINE | Facility: CLINIC | Age: 54
End: 2024-07-23
Payer: COMMERCIAL

## 2024-07-23 DIAGNOSIS — R73.03 PREDIABETES: Primary | ICD-10-CM

## 2024-07-23 DIAGNOSIS — E78.1 HYPERTRIGLYCERIDEMIA: ICD-10-CM

## 2024-07-23 PROCEDURE — 99213 OFFICE O/P EST LOW 20 MIN: CPT | Mod: 95 | Performed by: FAMILY MEDICINE

## 2024-07-23 NOTE — PROGRESS NOTES
Siri is a 53 year old who is being evaluated via a billable video visit.    How would you like to obtain your AVS? MyChart  If the video visit is dropped, the invitation should be resent by: Text to cell phone: [phone]  Will anyone else be joining your video visit? No      Subjective   Siri is a 53 year old, presenting for the following health issues:  Results      7/23/2024     4:56 PM   Additional Questions   Roomed by nancy         7/23/2024     4:56 PM   Patient Reported Additional Medications   Patient reports taking the following new medications none     History of Present Illness       Reason for visit:  Recent test results    She eats 2-3 servings of fruits and vegetables daily.She consumes 0 sweetened beverage(s) daily.She exercises with enough effort to increase her heart rate 30 to 60 minutes per day.  She exercises with enough effort to increase her heart rate 5 days per week.   She is taking medications regularly.           Review of Systems  Constitutional, HEENT, cardiovascular, pulmonary, GI, , musculoskeletal, neuro, skin, endocrine and psych systems are negative, except as otherwise noted.      Objective    Vitals - Patient Reported  Pain Score: No Pain (0)        Physical Exam   GENERAL: alert and no distress  EYES: Eyes grossly normal to inspection.  No discharge or erythema, or obvious scleral/conjunctival abnormalities.  RESP: No audible wheeze, cough, or visible cyanosis.    SKIN: Visible skin clear. No significant rash, abnormal pigmentation or lesions.  NEURO: Cranial nerves grossly intact.  Mentation and speech appropriate for age.  PSYCH: Appropriate affect, tone, and pace of words    (R73.03) Prediabetes  (primary encounter diagnosis)  Comment:   Plan: reviewed hemoglobin A1c. It was mildly elevated in the prediabetic range. Patient working on weight loss. Patient will work on low carb diet. Will monitor this.     (E78.1) Hypertriglyceridemia  Comment:   Plan: patient will work on  low fat diet and regular exercise. No need for medications at this time. Monitor.          Video-Visit Details    Type of service:  Video Visit   Originating Location (pt. Location): Home    Distant Location (provider location):  On-site  Platform used for Video Visit: Jam[employer]  Signed Electronically by: Chapito Coleman MD, MD

## 2024-07-31 ENCOUNTER — HOSPITAL ENCOUNTER (OUTPATIENT)
Facility: AMBULATORY SURGERY CENTER | Age: 54
Discharge: HOME | End: 2024-07-31
Attending: PHYSICAL MEDICINE & REHABILITATION | Admitting: PHYSICAL MEDICINE & REHABILITATION
Payer: COMMERCIAL

## 2024-07-31 ENCOUNTER — ANCILLARY PROCEDURE (OUTPATIENT)
Dept: RADIOLOGY | Facility: AMBULATORY SURGERY CENTER | Age: 54
End: 2024-07-31
Attending: PHYSICAL MEDICINE & REHABILITATION
Payer: COMMERCIAL

## 2024-07-31 VITALS
BODY MASS INDEX: 37.03 KG/M2 | WEIGHT: 209 LBS | OXYGEN SATURATION: 98 % | DIASTOLIC BLOOD PRESSURE: 59 MMHG | RESPIRATION RATE: 17 BRPM | TEMPERATURE: 97.6 F | HEART RATE: 71 BPM | HEIGHT: 63 IN | SYSTOLIC BLOOD PRESSURE: 103 MMHG

## 2024-07-31 DIAGNOSIS — M47.896 OTHER SPONDYLOSIS, LUMBAR REGION: ICD-10-CM

## 2024-07-31 PROCEDURE — 64493 INJ PARAVERT F JNT L/S 1 LEV: CPT | Mod: 50,KX

## 2024-07-31 RX ORDER — LIDOCAINE HYDROCHLORIDE 20 MG/ML
INJECTION, SOLUTION INFILTRATION; PERINEURAL PRN
Status: DISCONTINUED | OUTPATIENT
Start: 2024-07-31 | End: 2024-07-31 | Stop reason: HOSPADM

## 2024-07-31 RX ORDER — IOPAMIDOL 408 MG/ML
INJECTION, SOLUTION INTRATHECAL PRN
Status: DISCONTINUED | OUTPATIENT
Start: 2024-07-31 | End: 2024-07-31 | Stop reason: HOSPADM

## 2024-07-31 RX ORDER — PANTOPRAZOLE SODIUM 40 MG/1
1 TABLET, DELAYED RELEASE ORAL DAILY
COMMUNITY
Start: 2022-09-20 | End: 2024-07-31

## 2024-07-31 RX ORDER — IBUPROFEN 400 MG/1
400 TABLET, FILM COATED ORAL
COMMUNITY
Start: 2022-09-20 | End: 2024-09-18

## 2024-07-31 NOTE — DISCHARGE INSTRUCTIONS
Home Care Instructions after a Medial Branch Block      In a medial branch block, a local anesthetic (numbing medicine) is injected near the medial branch nerve. This stops the transmission of pain signals from the facet joint. If this reduces your pain and improves your mobility, it may tell the doctor which facet joint is causing the pain. This procedure is a diagnostic procedure and is typically short lasting, with intentions of doing a radiofrequency ablation. With this injection, a steroid to increase the longevity of the blocks effect is rarely used.    Activity  -You may resume most normal activity levels with the exception of strenuous activity. It is important for us to know if your pain with normal activity is relieved after this injection.  -DO NOT shower for 24 hours  -DO NOT remove bandaid for 24 hours    Pain  -You may experience soreness at the injection site for one or two days  -You may use an ice pack for 20 minutes every 2 hours for the first 24 hours  -You may use a heating pad after the first 24 hours  -You may use Tylenol (acetaminophen) every 4 hours or other pain medicines as     directed by your physician    You may experience numbness radiating into your legs or arms (depending on the procedure location). This numbness may last several hours. Until sensation returns to normal; please use caution in walking, climbing stairs, and stepping out of your vehicle, etc.    DID YOU RECEIVE SEDATION TODAY?  No    If you received sedation please follow these additional safety measures.  Sedation medicine, if given, may remain active for many hours. It is important for the next 24 hours that you do not:  -Drive a car  -Operate machines or power tools  -Consume alcohol, including beer  -Sign any important papers or legal documents      PLEASE KEEP TRACK OF YOUR SYMPTOMS AND NOTE YOUR IMPROVEMENT FOR YOUR DOCTOR.     Fill out the pain diary and fax it back to us. You do not need to call us if the pain  diary was faxed. [phone] is the FAX number  If you do not have access to a fax machine, attach it to [employer].  You do not need to call us if the pain diary was attached to [employer].   If you cannot fax or send via [employer], then call our call center and request to speak with a nurse for follow up after a procedure       Please contact us if you have:  -Severe pain  -Fever more than 101.5 degrees Fahrenheit  -Signs of infection at the injection site (redness, swelling, or drainage)      FOR PM&R PATIENTS:  For patients seen by the PM and R service, please call [phone]. (Monday through Friday 8a-5p).  After business hours and weekends call [phone] and ask for the PM and R doctor on call). If you need to fax a pain diary to PM&R the fax number is [phone]. If you are unable to fax, uploading to [employer] is encouraged, then send to provider. If you have procedure scheduling questions please call [phone] Option #2.

## 2024-07-31 NOTE — OP NOTE
PROCEDURE NOTE: 2nd Diagnostic Lumbar Medial Branch Block    PROCEDURE DATE: 7/31/2024    PATIENT NAME: Siri Vyas  YOB: 1970    ATTENDING PHYSICIAN: Saima Macdonald MD   RESIDENT/FELLOW PHYSICIAN: None    PREOPERATIVE DIAGNOSIS:   Lumbar spondylosis  Lumbar facet pain  POSTOPERATIVE DIAGNOSIS: same    PROCEDURE PERFORMED: 2nd diagnostic medial branch block at the Bilateral L4, L5, and Sacral ala to treat the Bilateral  L4-5 and L5-S1 facet joints (L3, L4 medial branch nerve blocks and L5 dorsal rami nerve blocks)      FLUOROSCOPY WAS USED.     INDICATIONS FOR PROCEDURE:   Siri Vyas is a 53 year old female with a clinical picture consistent with bilateral axial low back pain and lumbar facet arthropathy who presents for diagnostic medial branch block at the levels noted above to assess if there is a facetogenic component to her low back pain.     PROCEDURE AND FINDINGS:    Siri was greeted in the pre-procedure holding area. The risk, benefits and alternatives to the procedure were again reviewed with the patient and written informed consent was placed in the chart. An IV line was not placed.  A 500 mL bag of NS was not connected to the patient. She was taken to the procedure room and positioned prone on the fluoroscopy table.  Routine monitors were applied including blood pressure cuff, and pulse oximetry. Prior to the procedure a time out was completed, verifying correct patient, procedure, site, positioning, and implants and/or special equipment.     The skin was prepped and draped in the usual sterile fashion. An AP fluoroscopic view was obtained to identify the vertebral bodies, the transverse processes and the superior articulating processes at the L4, L5, and Sacral ala aforementioned levels on the bilateral side(s). The skin overlying the junction of the TP-SAP at the aforementioned levels was anesthetized using a 27-gauge 1-1/4 inch needle with 1% preservative-free lidocaine for a  total volume of 0.5 ml per level. Next, a 25-gauge 3 1/2 inch Quincke spinal needle was advanced under an oblique fluoroscopic view to the junction of the superior articular process and transverse process(es). Correct needle position was then confirmed in the AP and lateral views.      After negative aspiration, 0.3 mls of Isovue-M contrast was injected at each site under live fluoroscopy; no vascular run off was identified and the contrast spread was adequate. At this point, after negative aspiration, 0.5mL of 2% Lidocaine, was injected at each site.  The needle was then re-styletted removed. The needle insertion site was dressed appropriately.     Siri was taken to the recovery room where she was monitored for a brief period of time. She tolerated the procedure well and was discharged home in stable condition with post procedural instructions.    Before the procedure, she reported a pain score of 6/10.   After the procedure, she reported a pain score of 2/10.      Follow-up will be Determined after block sheet reviewed.    COMPLICATIONS: None    COMMENTS: None

## 2024-08-01 ENCOUNTER — VIRTUAL VISIT (OUTPATIENT)
Dept: RHEUMATOLOGY | Facility: CLINIC | Age: 54
End: 2024-08-01
Attending: FAMILY MEDICINE
Payer: COMMERCIAL

## 2024-08-01 DIAGNOSIS — Z86.711 HISTORY OF PULMONARY EMBOLUS (PE): ICD-10-CM

## 2024-08-01 DIAGNOSIS — R20.2 NUMBNESS AND TINGLING IN RIGHT HAND: Primary | ICD-10-CM

## 2024-08-01 DIAGNOSIS — M25.561 CHRONIC PAIN OF BOTH KNEES: ICD-10-CM

## 2024-08-01 DIAGNOSIS — R20.0 NUMBNESS AND TINGLING IN RIGHT HAND: Primary | ICD-10-CM

## 2024-08-01 DIAGNOSIS — G89.29 CHRONIC PAIN OF BOTH KNEES: ICD-10-CM

## 2024-08-01 DIAGNOSIS — R53.82 CHRONIC FATIGUE: ICD-10-CM

## 2024-08-01 DIAGNOSIS — R76.8 ELEVATED ANTINUCLEAR ANTIBODY (ANA) LEVEL: ICD-10-CM

## 2024-08-01 DIAGNOSIS — Z82.49 FAMILY HISTORY OF BLOOD CLOTS: ICD-10-CM

## 2024-08-01 DIAGNOSIS — M25.562 CHRONIC PAIN OF BOTH KNEES: ICD-10-CM

## 2024-08-01 PROCEDURE — 99205 OFFICE O/P NEW HI 60 MIN: CPT | Mod: 95 | Performed by: NURSE PRACTITIONER

## 2024-08-01 NOTE — PROGRESS NOTES
Siri Vyas is a 53 year old who is being evaluated via a billable video visit.      How would you like to obtain your AVS? MyChart  If the video visit is dropped, the invitation should be resent by: Text to cell phone: [phone]  Will anyone else be joining your video visit? No  Are you still currently in the state of [place]? Yes  If patient encounters technical issues they should call [phone]    During this virtual visit the patient is located in [place], patient verifies this as the location during the entirety of this visit.     Video-Visit Details  Video Start Time:  9:15    Type of service:  Video Visit    Video End Time:10:00 AM    Originating Location (pt. Location): Home  Distant Location (provider location):  [facility]  Platform used for Video Visit: [facility]          Rheumatology Clinic Visit  Geneva Pantoja CNP      Siri Vyas  YOB: 1970    Age: 53 year old   MRN# [id number]       Date of Visit: 08/01/2024  Primary care provider: Chapito Coleman              Subjective:     Siri is a 53 year old female presents today for consult +DEBBIE. Referred by primary, Dr. Chapito Coleman.   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   Back: Has had 5 years back pain that radiates down left buttock to back of thigh is worse when sits down at desk. Is trying to work out. Is getting injections, had second, may get ablation. Did have MRI.   Joints: Knees hurt when she gets up in the morning, knees can be stiff for over an hour. Feels like legs can give out. Has had injections in Knees in past, been awhile, over 4 years ago.     2007 had large work up for PE and was negative and thought to be rt to BC.  ROS: Patient denies recent infections, fevers, [place], weight loss, diarrhea, rashes, SOB, mouth sores, frequent miscarriages, sickness in sun, raynauds, skin turning tight. No knowing  thyroid dz.   +Dry eye mild - eye drBennett Suggested drops, said they were a little dry. Does wake up at night to drink water.   +PE on BC  +Fatigue  +R 5th digit and sometimes 4th digit numb.        Past Rheum tx:      Social History  [place], , father and children live her.    for [employer] , previous research   No tobacco  ETOH-social  Walking 5-7 a day   Boating, concerts, takes care of dad  Place up [place]    FMH:  Son -ILD antisythatase syndrome. PE  Cousin- Lupus  Son with Klinefelters  Daughter 2 blood clots, factor V liden   Factor v liden    Active Problem list:  Patient Active Problem List    Diagnosis Date Noted    Other spondylosis, lumbar region 06/11/2024     Priority: Medium    Caregiver role strain 12/10/2021     Priority: Medium    Pain and swelling of right knee 02/18/2019     Priority: Medium    Obesity (BMI 35.0-39.9) with comorbidity (H) 02/18/2019     Priority: Medium    Primary osteoarthritis of both knees 10/11/2018     Priority: Medium    Grief reaction 09/10/2018     Priority: Medium    Pain and swelling of left knee 09/07/2018     Priority: Medium    Acute left-sided low back pain without sciatica 09/07/2018     Priority: Medium    Thrombophlebitis leg 07/20/2018     Priority: Medium    Saphenous vein clot, left 07/17/2018     Priority: Medium    Family history of malignant neoplasm of breast 03/15/2018     Priority: Medium    Class 2 obesity due to excess calories without serious comorbidity with body mass index (BMI) of 39.0 to 39.9 in adult 03/15/2018     Priority: Medium    Chondromalacia patellae, right 09/01/2017     Priority: Medium    Chondromalacia, knee, left 01/26/2017     Priority: Medium    Headache 04/02/2015     Priority: Medium     Problem list name updated by automated process. Provider to review      CTS (carpal tunnel syndrome) 10/01/2014     Priority: Medium    Tendinitis of left wrist 06/02/2014     Priority: Medium    Epicondylitis,  medial humeral 2014     Priority: Medium    Median neuropathy 2014     Priority: Medium    Paresthesias/numbness 2014     Priority: Medium     IMO Regulatory Load OCT 2020      LUQ abdominal pain 2012     Priority: Medium    S/P cholecystectomy 2012     Priority: Medium    Dermoid cyst of ovary 2012     Priority: Medium     Left ovary         H/O:  section 2012     Priority: Medium     X 4         CARDIOVASCULAR SCREENING; LDL GOAL LESS THAN 160 10/31/2010     Priority: Medium    Seasonal allergic rhinitis 2010     Priority: Medium            Objective:     Physical Exam  LMP  (LMP Unknown)   Breastfeeding No   There is no height or weight on file to calculate BMI.    Constitutional: Normal body habitus with out gross deformities. Well groomed.   Psych: nl judgement, orientation, memory, affect.       Hands: FROM on video, visible joint lines, no visible swelling.          Labs:    Lab Results   Component Value Date    ALT 25 2020    AST 14 2020    CR 0.60 2020    CRP 10.5 (H) 2017      Latest Reference Range & Units 24 16:28   CRP Inflammation <5.00 mg/L 14.20 (H)   Rheumatoid Factor <14 IU/mL <10   (   Latest Reference Range & Units 24 16:28   DEBBIE interpretation Negative  Positive !   DEBBIE pattern 1  Speckled   DEBBIE titer 1  1:160     Imaging:   Narrative & Impression   MRI LUMBAR SPINE WITHOUT CONTRAST   2024 8:09 AM      HISTORY: Chronic low back pain, left posterior pelvic pain and  intermittent left greater than right leg pain; Chronic bilateral low  back pain, unspecified whether sciatica present; Somatic dysfunction  of pelvis region.     TECHNIQUE: Multiplanar multisequence MRI of the lumbar spine without  contrast.     COMPARISON: Lumbar spine MRI 2021.      FINDINGS:  Alignment is essentially within normal limits. Bone marrow  demonstrates minimal degenerative endplate changes including mild  edema and  fatty marrow change (mixed Modic 1/2) most conspicuous  surrounding the L5-S1 disc joint. Conus medullaris is unremarkable  terminating at the level of the L1-2 disc. Cauda equina is  unremarkable. Bilateral sacroiliac joint degenerative change. Small  areas of T2 hyperintense signal within bilateral kidneys, incompletely  characterized, presumably benign.     Segmental Analysis:      L1-L2:  Disc height maintained. No herniation. Mild bilateral facet  arthropathy. No foraminal or spinal canal stenosis.       L2-L3:  Mild disc height loss. Minimal bulge. Mild bilateral facet  arthropathy. No foraminal or spinal canal stenosis.       L3-L4:  Mild disc height loss. Minimal bulge. Mild bilateral facet  arthropathy. No foraminal or spinal canal stenosis.       L4-L5:  Mild disc height loss. Disc bulge, asymmetric to the right  with shallow right foraminal component, similar compared to the prior.  Moderate bilateral facet arthropathy. Mild right foraminal stenosis.  No left foraminal stenosis. Mild spinal canal stenosis.       L5-S1:  Mild disc height loss. Minimal bulge. Mild right and moderate  left facet arthropathy. No right foraminal stenosis. Mild left  foraminal stenosis. No spinal canal stenosis.                                                                      IMPRESSION:       1. Mild degenerative change as detailed.  2. No high-grade stenoses.     MIKE CHOE MD             Assessment and Plan:     1) +DEBBIE with mild SICCA, knee arthralgia:  Pt was donating blood and received a notification that she had HLA abys, her DEBBIE was checked and was found to be + and referred to rheumatology. CTD ROS + for dry eye mild, slight dry mouth at night, prolonged am stiffness of 1 hour bl knees. General ROS significant fatigue  Will check CANDELARIO and do CBC, CR, UA to access for Sjogren, if SSA SSB negative and CBC, CR, and UA normal would not do further work up for +DEBBIE at this time unless SICCA complex worsens.     2) Hx  of PE, son with ILD/ antisynthetase syndrome and PE: LAC, will check B2GC and CLA.     3) R 5,4 digit numbness: Previous L cubital and carpal tunnel release. Will do R EMG.     Pt Instructions:     At this time it is unlikely you have a connective tissue disease, however you do have dry eye and mouth in setting of +DEBBIE and history of blood clot in you and your family. For this reason we are going to do some further lab testing.     1) EMG Right hand, suspect cubital tunnel syndrome.     2) Dec 2nd 2:45 check in for a 3 pm appt in person    3) labs this month, schedule this     Pt states understanding and agreement to plan of care, has no further questions.   70 minute spent on the date of the encounter doing chart review, history and exam, documentation and further activities per the note      Geneva Pantoja CNP  Rheumatology, [facility]

## 2024-08-01 NOTE — PATIENT INSTRUCTIONS
At this time it is unlikely you have a connective tissue disease, however you do have dry eye and mouth in setting of +DEBBIE and history of blood clot in you and your family. For this reason we are going to do some further lab testing.     1) EMG Right hand, suspect cubital tunnel syndrome.     2) Dec 2nd 2:45 check in for a 3 pm appt in person    3) labs this month, schedule this

## 2024-08-06 ENCOUNTER — ANCILLARY PROCEDURE (OUTPATIENT)
Dept: GENERAL RADIOLOGY | Facility: CLINIC | Age: 54
End: 2024-08-06
Attending: NURSE PRACTITIONER
Payer: COMMERCIAL

## 2024-08-06 ENCOUNTER — LAB (OUTPATIENT)
Dept: LAB | Facility: CLINIC | Age: 54
End: 2024-08-06
Payer: COMMERCIAL

## 2024-08-06 DIAGNOSIS — M25.562 CHRONIC PAIN OF BOTH KNEES: ICD-10-CM

## 2024-08-06 DIAGNOSIS — Z86.711 HISTORY OF PULMONARY EMBOLUS (PE): ICD-10-CM

## 2024-08-06 DIAGNOSIS — R20.0 NUMBNESS AND TINGLING IN RIGHT HAND: ICD-10-CM

## 2024-08-06 DIAGNOSIS — G89.29 CHRONIC PAIN OF BOTH KNEES: ICD-10-CM

## 2024-08-06 DIAGNOSIS — Z11.59 NEED FOR HEPATITIS C SCREENING TEST: Primary | ICD-10-CM

## 2024-08-06 DIAGNOSIS — R53.82 CHRONIC FATIGUE: ICD-10-CM

## 2024-08-06 DIAGNOSIS — R20.2 NUMBNESS AND TINGLING IN RIGHT HAND: ICD-10-CM

## 2024-08-06 DIAGNOSIS — R76.8 ELEVATED ANTINUCLEAR ANTIBODY (ANA) LEVEL: ICD-10-CM

## 2024-08-06 DIAGNOSIS — Z82.49 FAMILY HISTORY OF BLOOD CLOTS: ICD-10-CM

## 2024-08-06 DIAGNOSIS — M25.561 CHRONIC PAIN OF BOTH KNEES: ICD-10-CM

## 2024-08-06 LAB
ALBUMIN UR-MCNC: NEGATIVE MG/DL
APPEARANCE UR: CLEAR
BILIRUB UR QL STRIP: NEGATIVE
COLOR UR AUTO: COLORLESS
GLUCOSE UR STRIP-MCNC: NEGATIVE MG/DL
HCV AB SERPL QL IA: NONREACTIVE
HGB UR QL STRIP: NEGATIVE
KETONES UR STRIP-MCNC: NEGATIVE MG/DL
LEUKOCYTE ESTERASE UR QL STRIP: NEGATIVE
NITRATE UR QL: NEGATIVE
PH UR STRIP: 7 [PH] (ref 5–7)
RBC #/AREA URNS AUTO: ABNORMAL /HPF
SP GR UR STRIP: 1 (ref 1–1.03)
SQUAMOUS #/AREA URNS AUTO: ABNORMAL /LPF
TSH SERPL DL<=0.005 MIU/L-ACNC: 3.15 UIU/ML (ref 0.3–4.2)
UROBILINOGEN UR STRIP-MCNC: NORMAL MG/DL
WBC #/AREA URNS AUTO: ABNORMAL /HPF

## 2024-08-06 PROCEDURE — 86146 BETA-2 GLYCOPROTEIN ANTIBODY: CPT

## 2024-08-06 PROCEDURE — 82306 VITAMIN D 25 HYDROXY: CPT

## 2024-08-06 PROCEDURE — 86803 HEPATITIS C AB TEST: CPT

## 2024-08-06 PROCEDURE — 84443 ASSAY THYROID STIM HORMONE: CPT

## 2024-08-06 PROCEDURE — 99000 SPECIMEN HANDLING OFFICE-LAB: CPT

## 2024-08-06 PROCEDURE — 86800 THYROGLOBULIN ANTIBODY: CPT

## 2024-08-06 PROCEDURE — 73562 X-RAY EXAM OF KNEE 3: CPT | Mod: LT | Performed by: RADIOLOGY

## 2024-08-06 PROCEDURE — 83516 IMMUNOASSAY NONANTIBODY: CPT | Mod: 90

## 2024-08-06 PROCEDURE — 81001 URINALYSIS AUTO W/SCOPE: CPT

## 2024-08-06 PROCEDURE — 86376 MICROSOMAL ANTIBODY EACH: CPT

## 2024-08-06 PROCEDURE — 36415 COLL VENOUS BLD VENIPUNCTURE: CPT

## 2024-08-07 DIAGNOSIS — M47.896 OTHER SPONDYLOSIS, LUMBAR REGION: Primary | ICD-10-CM

## 2024-08-07 LAB
B2 GLYCOPROT1 IGG SERPL IA-ACNC: <0.8 U/ML
B2 GLYCOPROT1 IGM SERPL IA-ACNC: <2.4 U/ML
THYROGLOB AB SERPL IA-ACNC: <20 IU/ML
THYROPEROXIDASE AB SERPL-ACNC: <10 IU/ML
VIT D+METAB SERPL-MCNC: 128 NG/ML (ref 20–50)

## 2024-08-07 RX ORDER — LIDOCAINE 40 MG/G
CREAM TOPICAL
Status: CANCELLED | OUTPATIENT
Start: 2024-08-07

## 2024-08-07 RX ORDER — SODIUM CHLORIDE 9 MG/ML
500 INJECTION, SOLUTION INTRAVENOUS CONTINUOUS
Status: CANCELLED | OUTPATIENT
Start: 2024-08-07 | End: 2024-08-07

## 2024-08-08 ENCOUNTER — TELEPHONE (OUTPATIENT)
Dept: PHYSICAL MEDICINE AND REHAB | Facility: CLINIC | Age: 54
End: 2024-08-08
Payer: COMMERCIAL

## 2024-08-08 NOTE — TELEPHONE ENCOUNTER
Called patient to schedule procedure with Dr. Macdonald    Date of Procedure: 9/18/24    Arrival time given: Yes: Arrival Time 8:30am       Procedure Location: [facility]     Verified Location with Patient:  Yes  Address provided to the patient    Pre-op H&P Required:  No: Moderate Sedation       Post-Op/Follow Up Appt:  Yes: 10/30/24 @11:20am       Informed patient they will need a  to drive them home:  Yes    Patients : Spouse     Patient is aware that pre-op RN from the procedure center will call 2-3 days prior to scheduled procedure to confirm arrival time and review any instructions:  Yes       Additional Comments: N/A        Brooke Geller MA on 8/8/2024 at 9:55 AM      P: [phone]*

## 2024-08-10 LAB — HISTONE IGG SER IA-ACNC: 1.3 UNITS

## 2024-09-05 ENCOUNTER — E-VISIT (OUTPATIENT)
Dept: URGENT CARE | Facility: CLINIC | Age: 54
End: 2024-09-05
Payer: COMMERCIAL

## 2024-09-05 ENCOUNTER — MYC MEDICAL ADVICE (OUTPATIENT)
Dept: FAMILY MEDICINE | Facility: CLINIC | Age: 54
End: 2024-09-05
Payer: COMMERCIAL

## 2024-09-05 DIAGNOSIS — B00.1 HERPES LABIALIS: Primary | ICD-10-CM

## 2024-09-05 PROCEDURE — 99207 PR NON-BILLABLE SERV PER CHARTING: CPT | Performed by: PHYSICIAN ASSISTANT

## 2024-09-05 RX ORDER — VALACYCLOVIR HYDROCHLORIDE 1 G/1
2000 TABLET, FILM COATED ORAL 2 TIMES DAILY
Qty: 8 TABLET | Refills: 1 | Status: SHIPPED | OUTPATIENT
Start: 2024-09-05 | End: 2024-09-18

## 2024-09-18 ENCOUNTER — ANCILLARY PROCEDURE (OUTPATIENT)
Dept: RADIOLOGY | Facility: AMBULATORY SURGERY CENTER | Age: 54
End: 2024-09-18
Attending: PHYSICAL MEDICINE & REHABILITATION
Payer: COMMERCIAL

## 2024-09-18 ENCOUNTER — HOSPITAL ENCOUNTER (OUTPATIENT)
Facility: AMBULATORY SURGERY CENTER | Age: 54
Discharge: HOME | End: 2024-09-18
Attending: PHYSICAL MEDICINE & REHABILITATION | Admitting: PHYSICAL MEDICINE & REHABILITATION
Payer: COMMERCIAL

## 2024-09-18 VITALS
HEART RATE: 75 BPM | SYSTOLIC BLOOD PRESSURE: 134 MMHG | OXYGEN SATURATION: 95 % | RESPIRATION RATE: 17 BRPM | DIASTOLIC BLOOD PRESSURE: 92 MMHG

## 2024-09-18 DIAGNOSIS — R52 PAIN: ICD-10-CM

## 2024-09-18 DIAGNOSIS — M47.896 OTHER SPONDYLOSIS, LUMBAR REGION: ICD-10-CM

## 2024-09-18 PROBLEM — R10.31 RLQ ABDOMINAL PAIN: Status: ACTIVE | Noted: 2024-09-18

## 2024-09-18 PROCEDURE — 64635 DESTROY LUMB/SAC FACET JNT: CPT | Mod: 50

## 2024-09-18 RX ORDER — LIDOCAINE 40 MG/G
CREAM TOPICAL
Status: DISCONTINUED | OUTPATIENT
Start: 2024-09-18 | End: 2024-09-19 | Stop reason: HOSPADM

## 2024-09-18 RX ORDER — KETOROLAC TROMETHAMINE 30 MG/ML
15 INJECTION, SOLUTION INTRAMUSCULAR; INTRAVENOUS ONCE
Status: COMPLETED | OUTPATIENT
Start: 2024-09-18 | End: 2024-09-18

## 2024-09-18 RX ORDER — LIDOCAINE HYDROCHLORIDE 10 MG/ML
INJECTION, SOLUTION EPIDURAL; INFILTRATION; INTRACAUDAL; PERINEURAL DAILY PRN
Status: DISCONTINUED | OUTPATIENT
Start: 2024-09-18 | End: 2024-09-18 | Stop reason: HOSPADM

## 2024-09-18 RX ORDER — LIDOCAINE HYDROCHLORIDE 40 MG/ML
INJECTION, SOLUTION RETROBULBAR DAILY PRN
Status: DISCONTINUED | OUTPATIENT
Start: 2024-09-18 | End: 2024-09-18 | Stop reason: HOSPADM

## 2024-09-18 RX ORDER — FENTANYL CITRATE 50 UG/ML
INJECTION, SOLUTION INTRAMUSCULAR; INTRAVENOUS PRN
Status: DISCONTINUED | OUTPATIENT
Start: 2024-09-18 | End: 2024-09-18 | Stop reason: HOSPADM

## 2024-09-18 RX ORDER — SODIUM CHLORIDE 9 MG/ML
500 INJECTION, SOLUTION INTRAVENOUS CONTINUOUS
Status: ACTIVE | OUTPATIENT
Start: 2024-09-18 | End: 2024-09-18

## 2024-09-18 RX ADMIN — KETOROLAC TROMETHAMINE 15 MG: 30 INJECTION, SOLUTION INTRAMUSCULAR; INTRAVENOUS at 10:19

## 2024-09-18 NOTE — DISCHARGE INSTRUCTIONS
Home Care Instructions after a Radio Frequency Ablation    Activity  -Rest today  -Do not work today  -Resume normal activity in 2-3 days  -No heavy lifting, turning or twisting for 24 hours  -DO NOT shower or soak in tub for 24 hours  -DO NOT remove bandaid for 24 hours    Pain  -You may experience soreness at the injection site for one or two days  -You may use an ice pack for 20 minutes every 2 hours for the first 24 hours, longer if needed for comfort, do not use heat  -You may use Tylenol (acetaminophen) every 4 hours or other pain medicines as directed by your physician  -If other pain medications are prescribed by your physician, please follow dosing instructions carefully.    What to expect  You may experience numbness radiating into your legs or arms (depending on the procedure location). This numbness may last several hours. Until sensation returns to normal, please use caution in walking, climbing stairs, and stepping out of your vehicle, etc. Relief of your initial symptoms can take up to 4 weeks to feel better, this is normal and due to the healing process. The procedure site may feel like a deep burn. Using ice will greatly minimize this discomfort. Do not use numbing creams or patches over your injection sites immediately following your procedure. Please keep injection sites covered, clean and dry for 24 hours.    DID YOU RECEIVE SEDATION TODAY?  Yes    Safety  Sedation medicine, if given, may remain active for many hours. It is important for the next 24 hours that you do not:  -Drive a car  -Operate machines or power tools  -Consume alcohol, including beer  -Sign any important papers or legal documents  -Please have a responsible adult with you for 24 hours following any sedation    Please contact us if you have:  -Severe pain  -Fever more than 101.5 degrees Fahrenheit  -Signs of infection at the injection site (redness, swelling, or drainage)    FOR PM&R PATIENTS:  For patients seen by the PM and R  service, please call [phone]. (Monday through Friday 8a-5p.  After business hours and weekends call [phone] and ask for the PM and R doctor on call). If you need to fax a pain diary to PM&R the fax number is [phone]. If you are unable to fax, uploading to [employer] is encouraged, then send to provider. If you have procedure scheduling questions please call [phone] Option #2.

## 2024-09-18 NOTE — H&P
Siri Lunalon  [id number]  female  53 year old      Reason for procedure/surgery: Bilateral L4-5, L5-S1 facet radiofrequency ablation    Patient Active Problem List   Diagnosis    Seasonal allergic rhinitis    CARDIOVASCULAR SCREENING; LDL GOAL LESS THAN 160    S/P cholecystectomy    Dermoid cyst of ovary    H/O:  section    LUQ abdominal pain    Paresthesias/numbness    Median neuropathy    Epicondylitis, medial humeral    Tendinitis of left wrist    Headache    Chondromalacia, knee, left    Chondromalacia patellae, right    CTS (carpal tunnel syndrome)    Family history of malignant neoplasm of breast    Class 2 obesity due to excess calories without serious comorbidity with body mass index (BMI) of 39.0 to 39.9 in adult    Saphenous vein clot, left    Thrombophlebitis leg    Pain and swelling of left knee    Acute left-sided low back pain without sciatica    Grief reaction    Primary osteoarthritis of both knees    Pain and swelling of right knee    Obesity (BMI 35.0-39.9) with comorbidity (H)    Caregiver role strain    Other spondylosis, lumbar region    RLQ abdominal pain       Past Surgical History:    Past Surgical History:   Procedure Laterality Date    ABDOMEN SURGERY  96,98,00,04, 99    4 c-sections, dermoid cyst    Dermoid cyst  (OVARY) removal      GYN SURGERY  ,,,         HERNIA REPAIR      INJECT BLOCK MEDIAL BRANCH CERVICAL/THORACIC/LUMBAR Bilateral 2024    Procedure: BILATERAL L4-5, L5-S1 medial branch block #1;  Surgeon: Saima Macdonald MD;  Location: [facility] OR    INJECT BLOCK MEDIAL BRANCH CERVICAL/THORACIC/LUMBAR Bilateral 2024    Procedure: Bilateral L4-5, L5-S1 medial branch block #2;  Surgeon: Saima Macdonald MD;  Location: [facility] OR    LAPAROSCOPIC CHOLECYSTECTOMY      SINUS SURGERY  2002    Nasls sinuses    TONSILLECTOMY  1999       Past Medical History:   Past Medical History:   Diagnosis Date    Anxiety disorder     AR (allergic  rhinitis)     Bilateral primary osteoarthritis of knee 10/11/2018    Cancer (H) 2000, 2018    breast cancer, lung cancer mother    Cerebral infarction (H)     grandmother    Depression with anxiety 10/2006    High cholesterol 10/2005    Hypertension     Mother  and brother    PE (pulmonary embolism) 01/2007       Social History:   Social History     Tobacco Use    Smoking status: Never    Smokeless tobacco: Never    Tobacco comments:     no second hand smoke   Substance Use Topics    Alcohol use: Yes     Comment: 1-2 days in a week. Each time 1-2 beers. 0-5 beers weeks       Family History:   Family History   Problem Relation Age of Onset    Breast Cancer Mother     Lipids Mother     Hypertension Mother     Hearing Loss Mother     Lung Cancer Mother         2017    Hyperlipidemia Mother     Other Cancer Mother         lung    Esophageal Cancer Mother     LUNG DISEASE Mother     Lipids Brother     Vascular Disease Father     Cerebrovascular Disease Maternal Grandmother 76    Neurologic Disorder Son 14        migraines    Breast Cancer Maternal Aunt 70    Breast Cancer Maternal Aunt 80    Hypertension Brother     Hyperlipidemia Brother     Colon Cancer Cousin     Other Cancer Cousin     Other Cancer Other         breast       Allergies:   Allergies   Allergen Reactions    Dust Mites     No Clinical Screening - See Comments Other (See Comments)    No Known Drug Allergy     Ragweeds Other (See Comments)    Seasonal Allergies        Active Medications:   Current Outpatient Medications   Medication Sig Dispense Refill    acetaminophen (TYLENOL) 325 MG tablet Take 325-650 mg by mouth every 6 hours as needed      aspirin (ASA) 81 MG EC tablet Take 1 tablet (81 mg) by mouth daily 90 tablet 3    Cholecalciferol (VITAMIN D3) 25 MCG (1000 UT) CAPS Take 2,000 Units by mouth      fexofenadine (ALLEGRA) 180 MG tablet Take  by mouth. 1 TABLET DAILY FOR ALLERGIES 90 tablet 0    GLUCOSAMINE-CHONDROITIN PO       magnesium oxide 400 MG  CAPS Take by mouth daily      Multiple Vitamin (MULTIVITAMINS PO)       Omega-3 Fatty Acids (OMEGA-3 FISH OIL PO)       Misc Natural Products (OSTEO BI-FLEX JOINT SHIELD PO) Take by mouth daily         Systemic Review:   CONSTITUTIONAL: NEGATIVE for fever, chills, change in weight  ENT/MOUTH: NEGATIVE for ear, mouth and throat problems  RESP: NEGATIVE for significant cough or SOB  CV: NEGATIVE for chest pain, palpitations or peripheral edema    Physical Examination:   Vital Signs: BP (!) 138/97   Resp 16   LMP  (LMP Unknown)   SpO2 98%   GENERAL: healthy, alert and no distress  NECK: no adenopathy, no asymmetry, masses, or scars  RESP: lungs clear to auscultation - no rales, rhonchi or wheezes  CV: regular rate and rhythm, normal S1 S2, no S3 or S4, no murmur, click or rub, no peripheral edema and peripheral pulses strong  ABDOMEN: soft, nontender, no hepatosplenomegaly, no masses and bowel sounds normal  MS: no gross musculoskeletal defects noted, no edema    Plan: Appropriate to proceed as scheduled.      Saima Macdonald MD  9/18/2024

## 2024-09-18 NOTE — OP NOTE
PROCEDURE NOTE: Lumbar Radiofrequency Ablation    PROCEDURE DATE: 9/18/2024    PATIENT NAME: Siri Vyas  YOB: 1970    ATTENDING PHYSICIAN: Saima Macdonald MD  RESIDENT/FELLOW PHYSICIAN: None    PREOPERATIVE DIAGNOSIS:   Other spondylosis, lumbar region   POSTOPERATIVE DIAGNOSIS: same    PROCEDURE PERFORMED: Lumbar Medial Branch Radiofrequency Ablation, at the Bialteral L4, L5, and Sacral ala levels to treat the L4-5 and L5-S1 facet joints ( L3, L4 medial branch nerves and L5 dorsal ramus)    IV SEDATION #1: Midazolam: 2mg   IV SEDATION #2  Fentanyl: 50mcg  Sedation and additional monitoring was provided by a sedation RN    ESTIMATED BLOOD LOSS:  None    FLUOROSCOPY WAS USED.    TOTAL SEDATION TIME: 34 minutes.    INDICATIONS FOR PROCEDURE:   Siri Vyas is a 53 year old female with a clinical picture consistent with bilateral lumbar facet arthropathy as determined after 2 rounds of medial branch blocks with local anesthetic, extensive conversation with the patient and evaluation of the patient's pain diaries after each MBB.    PROCEDURE AND FINDINGS:    Siri was greeted in the pre-procedure holding area. The risk, benefits and alternatives to the procedure were again reviewed with the patient and written informed consent was placed in the chart. An IV line was placed.  A 500 mL bag of NS was connected to the patient. She was taken to the procedure room and positioned prone on the fluoroscopy table.  Routine monitors were applied including EKG leads, blood pressure cuff, and pulse oximetry. Prior to the procedure a time out was completed, verifying correct patient, procedure, site, positioning, and implants and/or special equipment.     An AP fluoroscopic  film was taken to identify the aforementioned levels. The skin was prepped with chlorhexidine and draped in the usual sterile fashion. The overlying skin and subcutaneous tissue was anesthetized using a 27-gauge 1-1/4-inch needle with  1% preservative free lidocaine for a total volume of 1mls per level.    Utilizing an ipsilateral oblique fluoroscopic view (approximately 15-25 ), the 150mm, 18-gauge Venom RF needles with a 10 mm active tip were advanced towards the junction of the superior articular process and the transverse processes (SAP-TP) with a caudal angulation of the needle at the bilateral L4, L5, and Sacral ala vertebral bodies. The active tip was confirmed to be at the SAP-TP junction levels with additional AP and lateral fluoroscopic views. Then, approximately 10  caudad tilt was applied in the AP view to identify the bilateral sacral ala. The 150mm, 18-gauge Venom RF needles with a 10 mm active tip were advanced towards the in this view. Confirmation of the location of the needle tip was obtained utilizing a lateral fluoroscopic image. A correction fluoroscopic view, utilizing 45  of ipsilateral oblique tilt, was used to visualize the grove of the SAP-TP junction and advance the needles to the ideal location.     Next, sensory stimulation was performed at 50 Hz and up to 1 V with reproduction of sensation at 1.0 V. Motor stimulation was then performed at 2 Hz and up to 2V without any lower extremity stimulation observed (which was confirmed by the patient's self report). Once the Venom RF needle position was confirmed and prior to ablation, 0.5mL of 4% Lidocaine was injected at each level.     Radiofrequency ablation lesioning was carried out utilizing the following parameters: 80 C for 90 seconds. Following lesioning the needles were removed. The needle insertion site was dressed appropriately.     Her post-procedure analgesia was supplemented with 15mg of toradol IV once.     She was taken to the recovery room where she was monitored for a brief period of time. She tolerated the procedure well and was discharged home in stable condition with post procedural instructions.     [facility] with Dr. Sterling  x4-6wks.     COMPLICATIONS:  None    Comment(s):  None

## 2024-09-23 DIAGNOSIS — M19.90 OSTEOARTHRITIS: Primary | ICD-10-CM

## 2024-09-24 ENCOUNTER — LAB (OUTPATIENT)
Dept: LAB | Facility: CLINIC | Age: 54
End: 2024-09-24
Payer: COMMERCIAL

## 2024-09-24 DIAGNOSIS — Z82.49 FAMILY HISTORY OF BLOOD CLOTS: ICD-10-CM

## 2024-09-24 DIAGNOSIS — R76.8 ELEVATED ANTINUCLEAR ANTIBODY (ANA) LEVEL: ICD-10-CM

## 2024-09-24 DIAGNOSIS — Z86.711 HISTORY OF PULMONARY EMBOLUS (PE): ICD-10-CM

## 2024-09-24 LAB
ALBUMIN SERPL BCG-MCNC: 4.3 G/DL (ref 3.5–5.2)
ALT SERPL W P-5'-P-CCNC: 17 U/L (ref 0–50)
AST SERPL W P-5'-P-CCNC: 18 U/L (ref 0–45)
CREAT SERPL-MCNC: 0.59 MG/DL (ref 0.51–0.95)
CRP SERPL-MCNC: 9.2 MG/L
EGFRCR SERPLBLD CKD-EPI 2021: >90 ML/MIN/1.73M2
ERYTHROCYTE [DISTWIDTH] IN BLOOD BY AUTOMATED COUNT: 12.5 % (ref 10–15)
ERYTHROCYTE [SEDIMENTATION RATE] IN BLOOD BY WESTERGREN METHOD: 18 MM/HR (ref 0–30)
HCT VFR BLD AUTO: 42.8 % (ref 35–47)
HGB BLD-MCNC: 14.2 G/DL (ref 11.7–15.7)
MCH RBC QN AUTO: 29.3 PG (ref 26.5–33)
MCHC RBC AUTO-ENTMCNC: 33.2 G/DL (ref 31.5–36.5)
MCV RBC AUTO: 88 FL (ref 78–100)
PLATELET # BLD AUTO: 370 10E3/UL (ref 150–450)
RBC # BLD AUTO: 4.84 10E6/UL (ref 3.8–5.2)
WBC # BLD AUTO: 8.7 10E3/UL (ref 4–11)

## 2024-09-24 PROCEDURE — 85027 COMPLETE CBC AUTOMATED: CPT

## 2024-09-24 PROCEDURE — 86235 NUCLEAR ANTIGEN ANTIBODY: CPT

## 2024-09-24 PROCEDURE — 85652 RBC SED RATE AUTOMATED: CPT

## 2024-09-24 PROCEDURE — 86140 C-REACTIVE PROTEIN: CPT

## 2024-09-24 PROCEDURE — 84450 TRANSFERASE (AST) (SGOT): CPT

## 2024-09-24 PROCEDURE — 82040 ASSAY OF SERUM ALBUMIN: CPT

## 2024-09-24 PROCEDURE — 84460 ALANINE AMINO (ALT) (SGPT): CPT

## 2024-09-24 PROCEDURE — 36415 COLL VENOUS BLD VENIPUNCTURE: CPT

## 2024-09-24 PROCEDURE — 82565 ASSAY OF CREATININE: CPT

## 2024-09-25 LAB
ENA SM IGG SER IA-ACNC: <0.7 U/ML
ENA SM IGG SER IA-ACNC: NEGATIVE
ENA SS-A AB SER IA-ACNC: 88 U/ML
ENA SS-A AB SER IA-ACNC: POSITIVE
ENA SS-B IGG SER IA-ACNC: 3.6 U/ML
ENA SS-B IGG SER IA-ACNC: NEGATIVE
U1 SNRNP IGG SER IA-ACNC: 2 U/ML
U1 SNRNP IGG SER IA-ACNC: NEGATIVE

## 2024-10-07 ENCOUNTER — OFFICE VISIT (OUTPATIENT)
Dept: URGENT CARE | Facility: URGENT CARE | Age: 54
End: 2024-10-07
Payer: COMMERCIAL

## 2024-10-07 ENCOUNTER — ANCILLARY PROCEDURE (OUTPATIENT)
Dept: GENERAL RADIOLOGY | Facility: CLINIC | Age: 54
End: 2024-10-07
Payer: COMMERCIAL

## 2024-10-07 VITALS
BODY MASS INDEX: 38.07 KG/M2 | TEMPERATURE: 98.1 F | WEIGHT: 214.9 LBS | RESPIRATION RATE: 24 BRPM | DIASTOLIC BLOOD PRESSURE: 81 MMHG | SYSTOLIC BLOOD PRESSURE: 139 MMHG | OXYGEN SATURATION: 98 % | HEART RATE: 84 BPM

## 2024-10-07 DIAGNOSIS — J18.9 PNEUMONIA OF RIGHT LOWER LOBE DUE TO INFECTIOUS ORGANISM: Primary | ICD-10-CM

## 2024-10-07 DIAGNOSIS — R05.1 ACUTE COUGH: ICD-10-CM

## 2024-10-07 PROCEDURE — 71046 X-RAY EXAM CHEST 2 VIEWS: CPT | Mod: TC | Performed by: RADIOLOGY

## 2024-10-07 PROCEDURE — 99213 OFFICE O/P EST LOW 20 MIN: CPT

## 2024-10-07 RX ORDER — AMOXICILLIN 500 MG/1
1000 CAPSULE ORAL 3 TIMES DAILY
Qty: 30 CAPSULE | Refills: 0 | Status: SHIPPED | OUTPATIENT
Start: 2024-10-07 | End: 2024-10-12

## 2024-10-07 RX ORDER — AZITHROMYCIN 250 MG/1
TABLET, FILM COATED ORAL
Qty: 6 TABLET | Refills: 0 | Status: SHIPPED | OUTPATIENT
Start: 2024-10-07 | End: 2024-10-12

## 2024-10-07 NOTE — PROGRESS NOTES
ASSESSMENT:  (J18.9) Pneumonia of right lower lobe due to infectious organism  (primary encounter diagnosis)  Plan: amoxicillin (AMOXIL) 500 MG capsule,         azithromycin (ZITHROMAX) 250 MG tablet    (R05.1) Acute cough  Plan: XR Chest 2 Views    PLAN:  Informed the patient that the chest x-ray shows the following per the radiologist report: Heart size and pulmonary vascularity normal. Interstitial densities right lower lung field are new since the prior exam and could represent pneumonia. The left lung is clear. No effusions. Clips upper abdomen.  Pneumonia patient instructions discussed and provided.  Discussed the need to take the antibiotics as prescribed and finish the full course even if symptoms improve.  We also discussed trying yogurt with active cultures or probiotic such as Culturelle daily to help prevent diarrhea will take the antibiotics.  Informed the patient to follow-up with her primary care provider in 7 to 10 days for recheck and go to the emergency department with any new or worsening symptoms.  Patient acknowledged her understanding of the above plan.    The use of Dragon/Carlypso dictation services may have been used to construct the content in this note; any grammatical or spelling errors are non-intentional. Please contact the author of this note directly if you are in need of any clarification.      Tu Steinberg, QUANG CNP      SUBJECTIVE:  Siri Vyas is a 53 year old female who presents to the clinic today with a chief complaint of cough  for 6 day(s).  Her cough is described as dry.    The patient's symptoms are moderate to severe and worsening.  Associated symptoms include body aches. The patient's symptoms are exacerbated by no particular triggers  Patient has been using Tylenol  to improve symptoms.    ROS  Negative except noted above.     OBJECTIVE:  /81 (BP Location: Left arm, Patient Position: Sitting, Cuff Size: Adult Large)   Pulse 84   Temp 98.1  F (36.7  C)  (Tympanic)   Resp 24   Wt 97.5 kg (214 lb 14.4 oz)   SpO2 98%   BMI 38.07 kg/m    GENERAL APPEARANCE: healthy, alert and no distress  EYES: EOMI,  PERRL, conjunctiva clear  HENT: nose and mouth without erythema, ulcers or lesions and oral mucous membranes moist, no erythema noted  NECK: supple, nontender, no lymphadenopathy  RESP: Crackles in the right lower lobe  CV: regular rates and rhythm, normal S1 S2, no murmur noted  SKIN: no suspicious lesions or rashes    X-RAY: Chest x-ray shows the following per the radiologist report: Heart size and pulmonary vascularity normal. Interstitial densities right lower lung field are new since the prior exam and could represent pneumonia. The left lung is clear. No effusions. Clips upper abdomen.

## 2024-10-07 NOTE — PATIENT INSTRUCTIONS
Chest x-ray shows the following per the radiologist report: Heart size and pulmonary vascularity normal. Interstitial densities right lower lung field are new since the prior exam and could represent pneumonia. The left lung is clear. No effusions. Clips upper abdomen.  Take the antibiotic as prescribed and finish the full course even if symptoms improve.  Try yogurt with active cultures or probiotics such as Culturelle daily to help prevent diarrhea while using antibiotics.  Follow up with your primary care provider in 7 to 10 days for recheck.  Go to the emergency department with any new or worsening symptoms.

## 2024-10-08 ENCOUNTER — MYC MEDICAL ADVICE (OUTPATIENT)
Dept: FAMILY MEDICINE | Facility: CLINIC | Age: 54
End: 2024-10-08
Payer: COMMERCIAL

## 2024-10-08 NOTE — TELEPHONE ENCOUNTER
Called patient and scheduled follow up for 10/22/2024 with provider Vivian as Dr. Coleman does not have openings.    Garcia Suarez

## 2024-10-08 NOTE — TELEPHONE ENCOUNTER
Routing to tcs- please help schedule urgent care f/up with Dr. Coleman.    Suzette Rodriguez, RN    [facility]- Primary Care

## 2024-10-15 ENCOUNTER — OFFICE VISIT (OUTPATIENT)
Dept: VASCULAR SURGERY | Facility: CLINIC | Age: 54
End: 2024-10-15
Payer: COMMERCIAL

## 2024-10-15 ENCOUNTER — LAB (OUTPATIENT)
Dept: LAB | Facility: CLINIC | Age: 54
End: 2024-10-15
Payer: COMMERCIAL

## 2024-10-15 VITALS — HEART RATE: 85 BPM | SYSTOLIC BLOOD PRESSURE: 121 MMHG | OXYGEN SATURATION: 95 % | DIASTOLIC BLOOD PRESSURE: 81 MMHG

## 2024-10-15 DIAGNOSIS — Z00.6 EXAMINATION OF PARTICIPANT OR CONTROL IN CLINICAL RESEARCH: Primary | ICD-10-CM

## 2024-10-15 DIAGNOSIS — M19.90 OSTEOARTHRITIS: ICD-10-CM

## 2024-10-15 LAB
ANION GAP SERPL CALCULATED.3IONS-SCNC: 10 MMOL/L (ref 7–15)
APTT PPP: 28 SECONDS (ref 22–38)
BUN SERPL-MCNC: 13.1 MG/DL (ref 6–20)
CALCIUM SERPL-MCNC: 9.9 MG/DL (ref 8.8–10.4)
CHLORIDE SERPL-SCNC: 103 MMOL/L (ref 98–107)
CREAT SERPL-MCNC: 0.63 MG/DL (ref 0.51–0.95)
EGFRCR SERPLBLD CKD-EPI 2021: >90 ML/MIN/1.73M2
ERYTHROCYTE [DISTWIDTH] IN BLOOD BY AUTOMATED COUNT: 12.7 % (ref 10–15)
GLUCOSE SERPL-MCNC: 98 MG/DL (ref 70–99)
HCO3 SERPL-SCNC: 25 MMOL/L (ref 22–29)
HCT VFR BLD AUTO: 42.3 % (ref 35–47)
HGB BLD-MCNC: 13.9 G/DL (ref 11.7–15.7)
INR PPP: 0.99 (ref 0.85–1.15)
MCH RBC QN AUTO: 29.3 PG (ref 26.5–33)
MCHC RBC AUTO-ENTMCNC: 32.9 G/DL (ref 31.5–36.5)
MCV RBC AUTO: 89 FL (ref 78–100)
PLATELET # BLD AUTO: 366 10E3/UL (ref 150–450)
POTASSIUM SERPL-SCNC: 4.5 MMOL/L (ref 3.4–5.3)
RBC # BLD AUTO: 4.74 10E6/UL (ref 3.8–5.2)
SODIUM SERPL-SCNC: 138 MMOL/L (ref 135–145)
WBC # BLD AUTO: 6.8 10E3/UL (ref 4–11)

## 2024-10-15 PROCEDURE — 99204 OFFICE O/P NEW MOD 45 MIN: CPT | Mod: GC | Performed by: RADIOLOGY

## 2024-10-15 PROCEDURE — 85027 COMPLETE CBC AUTOMATED: CPT | Performed by: PATHOLOGY

## 2024-10-15 PROCEDURE — 80048 BASIC METABOLIC PNL TOTAL CA: CPT | Performed by: PATHOLOGY

## 2024-10-15 PROCEDURE — 85730 THROMBOPLASTIN TIME PARTIAL: CPT | Performed by: PATHOLOGY

## 2024-10-15 PROCEDURE — 85610 PROTHROMBIN TIME: CPT | Performed by: PATHOLOGY

## 2024-10-15 PROCEDURE — 36415 COLL VENOUS BLD VENIPUNCTURE: CPT | Performed by: PATHOLOGY

## 2024-10-15 ASSESSMENT — PAIN SCALES - GENERAL: PAINLEVEL: NO PAIN (0)

## 2024-10-15 NOTE — NURSING NOTE
Chief Complaint   Patient presents with    New Patient     New Vascular - GAE study        Vitals were taken, medications reconciled.    Pauline Valadez, Clinic Assistant   10:16 AM

## 2024-10-15 NOTE — PROGRESS NOTES
INTERVENTIONAL RADIOLOGY CONSULTATION    Name: Siri Vyas  Age: 53 year old   REASON FOR REFERRAL: GAE trial evaluation    HPI:   iSri Vyas is a 54 yo F with past medical history of pulmonary embolism, cholecystitis, s/p cholecystectomy, and osteoarthritis presenting for consultation regarding genicular artery embolization clinical trial.    She has had pain in her knees for more than 5 years. It started initially with the left knee, but now it involves both knees. The left knee is still significantly worse than the right, which she describes as her better knee. She has tried cortisone shots which do help, but wear off quickly. She now takes supplements for the knees. Pain is worsened with exercise and stairs. The worst pain in the last 24h 4/10. When she exercises, she feels the pain is 7-8/10. She wants to work out everyday, but she has a lot of pain with exercise, and this prevents her from exercising as she wants to. She has been active all her life, playing basketball and soccer, and now she has two sons and wants to go hiking, fishing, and hunting with them. When she has pain, she takes tylenol, uses ice, and also has a brace. She think that there is mildly reduced range of motion in the knees. No additional hip or ankle pain other than sciatic pain from her back. She did recently have RFA of the bilateral L4, L5, and sacral ala to treat the L4-5 and L5-S1 facet joints.     PAST MEDICAL HISTORY:   Past Medical History:   Diagnosis Date    Anxiety disorder     AR (allergic rhinitis)     Bilateral primary osteoarthritis of knee 10/11/2018    Cancer (H) 2000, 2018    breast cancer, lung cancer mother    Cerebral infarction (H)     grandmother    Depression with anxiety 10/2006    High cholesterol 10/2005    Hypertension     Mother  and brother    PE (pulmonary embolism) 01/2007       PAST SURGICAL HISTORY:   Past Surgical History:   Procedure Laterality Date    ABDOMEN SURGERY  96,98,00,04, 99    4  c-sections, dermoid cyst    Dermoid cyst  (OVARY) removal      DESTRUCTION OF PARAVERTEBRAL FACET LUMBAR / SACRAL ADDITIONAL Bilateral 2024    Procedure: Bilateral L4-5, L5-S1 facet radiofrequency ablation;  Surgeon: Saima Macdonald MD;  Location: [facility] OR    GYN SURGERY  ,,,         HERNIA REPAIR      INJECT BLOCK MEDIAL BRANCH CERVICAL/THORACIC/LUMBAR Bilateral 2024    Procedure: BILATERAL L4-5, L5-S1 medial branch block #1;  Surgeon: Saima Macdonald MD;  Location: [facility] OR    INJECT BLOCK MEDIAL BRANCH CERVICAL/THORACIC/LUMBAR Bilateral 2024    Procedure: Bilateral L4-5, L5-S1 medial branch block #2;  Surgeon: Saima Macdonald MD;  Location: [facility] OR    LAPAROSCOPIC CHOLECYSTECTOMY      SINUS SURGERY      Nasls sinuses    TONSILLECTOMY  1999       FAMILY HISTORY:   Family History   Problem Relation Age of Onset    Breast Cancer Mother     Lipids Mother     Hypertension Mother     Hearing Loss Mother     Lung Cancer Mother         2017    Hyperlipidemia Mother     Other Cancer Mother         lung    Esophageal Cancer Mother     LUNG DISEASE Mother     Lipids Brother     Vascular Disease Father     Cerebrovascular Disease Maternal Grandmother 76    Neurologic Disorder Son 14        migraines    Breast Cancer Maternal Aunt 70    Breast Cancer Maternal Aunt 80    Hypertension Brother     Hyperlipidemia Brother     Colon Cancer Cousin     Other Cancer Cousin     Other Cancer Other         breast       SOCIAL HISTORY:   Social History     Tobacco Use    Smoking status: Never    Smokeless tobacco: Never    Tobacco comments:     no second hand smoke   Substance Use Topics    Alcohol use: Yes     Comment: 1-2 days in a week. Each time 1-2 beers. 0-5 beers weeks       PROBLEM LIST:   Patient Active Problem List    Diagnosis Date Noted    RLQ abdominal pain 2024     Priority: Medium    Other spondylosis, lumbar region 2024     Priority: Medium    Caregiver  role strain 12/10/2021     Priority: Medium    Pain and swelling of right knee 2019     Priority: Medium    Obesity (BMI 35.0-39.9) with comorbidity (H) 2019     Priority: Medium    Primary osteoarthritis of both knees 10/11/2018     Priority: Medium    Grief reaction 09/10/2018     Priority: Medium    Pain and swelling of left knee 2018     Priority: Medium    Acute left-sided low back pain without sciatica 2018     Priority: Medium    Thrombophlebitis leg 2018     Priority: Medium    Saphenous vein clot, left 2018     Priority: Medium    Family history of malignant neoplasm of breast 03/15/2018     Priority: Medium    Class 2 obesity due to excess calories without serious comorbidity with body mass index (BMI) of 39.0 to 39.9 in adult 03/15/2018     Priority: Medium    Chondromalacia patellae, right 2017     Priority: Medium    Chondromalacia, knee, left 2017     Priority: Medium    Headache 2015     Priority: Medium     Problem list name updated by automated process. Provider to review      CTS (carpal tunnel syndrome) 10/01/2014     Priority: Medium    Tendinitis of left wrist 2014     Priority: Medium    Epicondylitis, medial humeral 2014     Priority: Medium    Median neuropathy 2014     Priority: Medium    Paresthesias/numbness 2014     Priority: Medium     IMO Regulatory Load OCT 2020      LUQ abdominal pain 2012     Priority: Medium    S/P cholecystectomy 2012     Priority: Medium    Dermoid cyst of ovary 2012     Priority: Medium     Left ovary         H/O:  section 2012     Priority: Medium     X 4         CARDIOVASCULAR SCREENING; LDL GOAL LESS THAN 160 10/31/2010     Priority: Medium    Seasonal allergic rhinitis 2010     Priority: Medium       MEDICATIONS:   Prescription Medications as of 10/15/2024         Rx Number Disp Refills Start End Last Dispensed Date Next Fill Date Owning  Pharmacy    acetaminophen (TYLENOL) 325 MG tablet  -- --  --       Sig: Take 325-650 mg by mouth every 6 hours as needed    Class: Historical    Route: Oral    aspirin (ASA) 81 MG EC tablet  90 tablet 3 11/10/2022 --       Sig: Take 1 tablet (81 mg) by mouth daily    Class: Historical    Route: Oral    Cholecalciferol (VITAMIN D3) 25 MCG (1000 UT) CAPS  -- --  --   [id number] - [place], [place] - [address]    Sig: Take 2,000 Units by mouth    Class: Historical    Route: Oral    fexofenadine (ALLEGRA) 180 MG tablet  90 tablet 0 6/6/2011 --   [id number] - [place], [place] - [address] AT [facility] [address]    Sig: Take  by mouth. 1 TABLET DAILY FOR ALLERGIES    Class: E-Prescribe    Route: Oral    GLUCOSAMINE-CHONDROITIN PO  -- --  --       Class: Historical    Route: Oral    magnesium oxide 400 MG CAPS  -- --  --       Sig: Take by mouth daily    Class: Historical    Route: Oral    Misc Natural Products (OSTEO BI-FLEX JOINT SHIELD PO)  -- --  --   [id number] - [place], [place] - [address]    Sig: Take by mouth daily    Class: Historical    Route: Oral    Multiple Vitamin (MULTIVITAMINS PO)  -- --  --       Class: Historical    Route: Oral    Omega-3 Fatty Acids (OMEGA-3 FISH OIL PO)  -- --  --       Class: Historical    Route: Oral            ALLERGIES:   Dust mites, No clinical screening - see comments, No known drug allergy, Ragweeds, and Seasonal allergies    ROS:  Negative unless otherwise stated in HPI.      Physical Examination:   VITALS:   /81 (BP Location: Left arm, Patient Position: Chair, Cuff Size: Adult Large)   Pulse 85   SpO2 95%     General:  Pt sitting in chair comfortably.  No acute distress  HEENT: normocephalic.    Neck: no JVD  Resp: nonlabored. Speaks in full sentences.   CV: RRR.   Lymph: no pedal edema  Neuro: Oriented x3.  No evidence of focal motor deficits  Mood: appropriate affect  Musculoskeletal: Normal  gait.   Right leg:  Normal hip exam. Normal ankle exam.  No gross knee deformities. Normal knee ROM, flexion/extension. Medial joint line tenderness. Medial femoral condyle tenderness.  Negative anterior posterior medial and lateral drawer   Sensation intact  PT 2+  Popliteal 1+  Plantar dorsiflexion 5/5  Left leg:  Negative anterior posterior medial and lateral drawer  Medial joint line and medial femoral condyle.  Sensation intact   PT 2+  Popliteal 1+  Plantar dorsiflexion 5/5      Labs:    BMP RESULTS:  Lab Results   Component Value Date     10/15/2024     02/28/2020    POTASSIUM 4.5 10/15/2024    POTASSIUM 3.8 02/28/2020    CHLORIDE 103 10/15/2024    CHLORIDE 107 02/28/2020    CO2 25 10/15/2024    CO2 24 02/28/2020    ANIONGAP 10 10/15/2024    ANIONGAP 8 02/28/2020    GLC 98 10/15/2024    GLC 91 09/15/2020    BUN 13.1 10/15/2024    BUN 9 02/28/2020    CR 0.63 10/15/2024    CR 0.60 02/28/2020    GFRESTIMATED >90 10/15/2024    GFRESTIMATED >90 02/28/2020    GFRESTBLACK >90 02/28/2020    MYKE 9.9 10/15/2024    MYKE 9.1 02/28/2020        CBC RESULTS:  Lab Results   Component Value Date    WBC 6.8 10/15/2024    WBC 10.5 03/28/2019    RBC 4.74 10/15/2024    RBC 4.87 03/28/2019    HGB 13.9 10/15/2024    HGB 14.1 03/28/2019    HCT 42.3 10/15/2024    HCT 42.2 03/28/2019    MCV 89 10/15/2024    MCV 87 03/28/2019    MCH 29.3 10/15/2024    MCH 29.0 03/28/2019    MCHC 32.9 10/15/2024    MCHC 33.4 03/28/2019    RDW 12.7 10/15/2024    RDW 13.1 03/28/2019     10/15/2024     03/28/2019       INR/PTT:  Lab Results   Component Value Date    INR 0.99 10/15/2024    PTT 28 10/15/2024       Diagnostic studies:   Bilateral knee radiograph 8/6/2024:  Right: Moderate medial compartment joint space narrowing. Mild degenerative changes of the lateral and patellofemoral compartments. No joint effusion.    Left: Moderate medial compartment joint space narrowing. Mild degenerative changes of the lateral and  patellofemoral compartments. No joint effusion.       Assessment   Siri Vyas is a patient, with lifestyle limiting knee OA which has been resistant to conservative therapies at this point, who presents for GAE consultation. The patient, based on clinical history and current presentation, presents as a quality candidate for the GAE trial.  I discussed with the patient the risks and benefits of embolization procedure, including but not limited to groin hematoma, vascular injury, ischemic toes, nerve injury/paresthesia, livedo reticularis and skin injuries (which may require additional procedures) and lack of response to treatment.  I also explained to the patient that this is a randomized clinical trial, meaning that any individual could be assigned to intervention plus standard of the care versus standard of the care arm.  We discussed the investigational nature of the embolization, and the fact that this study is performed under the regulatory supervision of the FDA and IRB as an JULIAN.  I described how the embolization is performed for target vessels around the knee joint.  I also explained to the patient the need for future additional visits for imaging, physical examination, questionnaires, lab draws and joint aspirations.  We reviewed the literature.  Recent literature demonstrates the role of neovascularization and inflammation in osteoarthritis and its significant impact on overarching joint remodeling, resulting in excess pain and poor symptomatic outcomes. Recent studies focusing on GAE treatment of osteoarthritis have shown initial promising results in reducing pain, improving underlying mobility and functionality. While the trial is randomized into either GAE therapeutic arm or (non-intervention) standard of care arm, patient could potentially receive benefit from either in-study GAE therapy or an optimized standard of the care treatment and/or participate in f/u secondary clinical trials. Both possible  benefits and risks were discussed, along with alternative options. The patient meets initial study inclusion and exclusion criteria. Patient will plan to follow up with  and take time to make a final decision regarding participation in the study.  I explained to the patient any expenses related to the research study will be covered by the study findings and any standard or treatment would be insurance/patient responsibility.  The patient will need to establish care with a sports medicine provider, if there is not one already established. We would be happy to help arrange this for the patient so the SOC is optimized.  I also shared with the patient, that opting out of the study at any point is a viable option based on patient's desire.  The patient expressed understanding of the nature of the study, genicular artery embolization procedure and all his questions were answered, and eventually expressed in interest in enrollment in the study.        Sarah ESQUIVEL, Anay P, Donavan A, Qasim S, Irma H, Elvis SALAZAR, Marielena SMITH. Emerging Targets for the Treatment of Osteoarthritis: New Investigational Methods to Identify William-Vessels as Possible Targets for Embolization. Diagnostics ([place]). 2022 Jun 6;12(6):1403. doi: 10.3390/[id number]. PMID: 16725064; PMCID: [id number].     Okuno Y, Nelsonchi AM, Shinjo T, Juma S, Kaneko T. Midterm Clinical Outcomes and MR Imaging Changes after Transcatheter Arterial Embolization as a Treatment for Mild to Moderate Radiographic Knee Osteoarthritis Resistant to Conservative Treatment. J Vasc Interv Radiol. 2017 Jul;28(7):995-1002. doi: 10.1016/j.jvir.2017.02.033. Epub 2017 Mar 30. PMID: 91557577.     Cathleen AGUIRRE, Duc M, Uday J, Luis E A, Venkat P, Sesar T, Crispin N, Rani E, Cesar S, Savanah S, Moira C, Lorenzo R. Genicular artEry embolizatioN in patiEnts with oSteoarthrItiS of the Knee (LEILA) Using Permanent Microspheres: Interim Analysis.  Cardiovasc Intervent Radiol. 2021 Jun;44(6):931-940. doi: 10.1007/[id number]. Epub 2021 Jan 20. Erratum in: Cardiovasc Intervent Radiol. 2021 Apr 21;: PMID: 83167402; PMCID: [id number].     Plan   - Plan to consent patient into GAE trial pending patient's decision and formally meeting trial inclusion/exclusion criteria   - Plan to randomize patient into GAE trial pending study consent  - Schedule f/u appointment for GAE trial treatment plan in accordance to trial logistics  - We will make sure there is established sports medicine care to provide optimized SOC.      Natalie Prather, DO  Interventional Radiology  PGY-6  [phone]    I, Bruce Mclaughlin, was present with the resident during the history and exam. I discussed the case with Dr. Prather and agree with the findings as documented in the assessment and plan.    Bruce Mclaughlin MD    [facility]  Patient Care Team:  Chapito Coleman MD as PCP - General (Family Practice)  Chapito Coleman MD as Assigned PCP  Nick Sterling MD as MD (Occupational Medicine)  Saima Macdonald MD as MD (Physical Medicine and Rehabilitation)  Marlena Johnson MD as Physician (Neurology)  Geneva Pantoja NP as Assigned Rheumatology Provider  BRUCE MCLAUGHLIN

## 2024-10-15 NOTE — Clinical Note
10/15/2024       RE: Siri Vyas  [address]  [facility] [address]     Dear Colleague,    Thank you for referring your patient, Siri Vyas, to the [facility] [place] at [facility]. Please see a copy of my visit note below.        INTERVENTIONAL RADIOLOGY CONSULTATION    Name: Siri Vyas  Age: 53 year old   Referring Physician: Dr. Smith   REASON FOR REFERRAL: NAVIN     HPI:   Siri Vyas is a 52 yo F with past medical history of pulmonary embolism, cholecystitis, s/p cholecystectomy, and osteoarthritis presenting for consultation regarding genicular artery embolization clinical trial.    She has had pain in her knees for more than 5 years. It started initially with the left knee, but now it involves both knees. She has tried cortisone shots which do help, but wear off quickly. She now takes supplements for the knees. Pain is worsened with exercise and stairs. The worst pain in the last 24h 4/10. When she exercises, she feels the pain is 7-8/10. She wants to work out everyday, but she has a lot of pain with exercise, and this prevents her from exercising as she wants to. She has been active all her life, playing basketball and soccer, and now she has two sons and wants to go hiking, fishing, and hunting with them. When she has pain, she takes tylenol, uses ice, and also has a brace. She think that there is mildly reduced range of motion in the knees. No additional hip or ankle pain other than sciatic pain from her back. She did recently have RFA of the bilateral L4, L5, and sacral ala to treat the L4-5 and L5-S1 facet joints.     PAST MEDICAL HISTORY:   Past Medical History:   Diagnosis Date    Anxiety disorder     AR (allergic rhinitis)     Bilateral primary osteoarthritis of knee 10/11/2018    Cancer (H) 2000, 2018    breast cancer, lung cancer mother    Cerebral infarction (H)     grandmother    Depression with anxiety 10/2006    High  cholesterol 10/2005    Hypertension     Mother  and brother    PE (pulmonary embolism) 2007       PAST SURGICAL HISTORY:   Past Surgical History:   Procedure Laterality Date    ABDOMEN SURGERY  96,98,00,04, 99    4 c-sections, dermoid cyst    Dermoid cyst  (OVARY) removal      DESTRUCTION OF PARAVERTEBRAL FACET LUMBAR / SACRAL ADDITIONAL Bilateral 2024    Procedure: Bilateral L4-5, L5-S1 facet radiofrequency ablation;  Surgeon: Saima Macdonald MD;  Location: [facility] OR    GYN SURGERY  ,,,         HERNIA REPAIR      INJECT BLOCK MEDIAL BRANCH CERVICAL/THORACIC/LUMBAR Bilateral 2024    Procedure: BILATERAL L4-5, L5-S1 medial branch block #1;  Surgeon: Saima Macdonald MD;  Location: [facility] OR    INJECT BLOCK MEDIAL BRANCH CERVICAL/THORACIC/LUMBAR Bilateral 2024    Procedure: Bilateral L4-5, L5-S1 medial branch block #2;  Surgeon: Saima Macdonald MD;  Location: [facility] OR    LAPAROSCOPIC CHOLECYSTECTOMY      SINUS SURGERY      Nasls sinuses    TONSILLECTOMY  1999       FAMILY HISTORY:   Family History   Problem Relation Age of Onset    Breast Cancer Mother     Lipids Mother     Hypertension Mother     Hearing Loss Mother     Lung Cancer Mother         2017    Hyperlipidemia Mother     Other Cancer Mother         lung    Esophageal Cancer Mother     LUNG DISEASE Mother     Lipids Brother     Vascular Disease Father     Cerebrovascular Disease Maternal Grandmother 76    Neurologic Disorder Son 14        migraines    Breast Cancer Maternal Aunt 70    Breast Cancer Maternal Aunt 80    Hypertension Brother     Hyperlipidemia Brother     Colon Cancer Cousin     Other Cancer Cousin     Other Cancer Other         breast       SOCIAL HISTORY:   Social History     Tobacco Use    Smoking status: Never    Smokeless tobacco: Never    Tobacco comments:     no second hand smoke   Substance Use Topics    Alcohol use: Yes     Comment: 1-2 days in a week. Each time 1-2 beers. 0-5  beers weeks       PROBLEM LIST:   Patient Active Problem List    Diagnosis Date Noted    RLQ abdominal pain 2024     Priority: Medium    Other spondylosis, lumbar region 2024     Priority: Medium    Caregiver role strain 12/10/2021     Priority: Medium    Pain and swelling of right knee 2019     Priority: Medium    Obesity (BMI 35.0-39.9) with comorbidity (H) 2019     Priority: Medium    Primary osteoarthritis of both knees 10/11/2018     Priority: Medium    Grief reaction 09/10/2018     Priority: Medium    Pain and swelling of left knee 2018     Priority: Medium    Acute left-sided low back pain without sciatica 2018     Priority: Medium    Thrombophlebitis leg 2018     Priority: Medium    Saphenous vein clot, left 2018     Priority: Medium    Family history of malignant neoplasm of breast 03/15/2018     Priority: Medium    Class 2 obesity due to excess calories without serious comorbidity with body mass index (BMI) of 39.0 to 39.9 in adult 03/15/2018     Priority: Medium    Chondromalacia patellae, right 2017     Priority: Medium    Chondromalacia, knee, left 2017     Priority: Medium    Headache 2015     Priority: Medium     Problem list name updated by automated process. Provider to review      CTS (carpal tunnel syndrome) 10/01/2014     Priority: Medium    Tendinitis of left wrist 2014     Priority: Medium    Epicondylitis, medial humeral 2014     Priority: Medium    Median neuropathy 2014     Priority: Medium    Paresthesias/numbness 2014     Priority: Medium     IMO Regulatory Load OCT 2020      LUQ abdominal pain 2012     Priority: Medium    S/P cholecystectomy 2012     Priority: Medium    Dermoid cyst of ovary 2012     Priority: Medium     Left ovary         H/O:  section 2012     Priority: Medium     X 4         CARDIOVASCULAR SCREENING; LDL GOAL LESS THAN 160 10/31/2010     Priority:  Medium    Seasonal allergic rhinitis 08/19/2010     Priority: Medium       MEDICATIONS:   Prescription Medications as of 10/15/2024         Rx Number Disp Refills Start End Last Dispensed Date Next Fill Date Owning Pharmacy    acetaminophen (TYLENOL) 325 MG tablet  -- --  --       Sig: Take 325-650 mg by mouth every 6 hours as needed    Class: Historical    Route: Oral    aspirin (ASA) 81 MG EC tablet  90 tablet 3 11/10/2022 --       Sig: Take 1 tablet (81 mg) by mouth daily    Class: Historical    Route: Oral    Cholecalciferol (VITAMIN D3) 25 MCG (1000 UT) CAPS  -- --  --   [facility]/pharmacy #[id number] - [address]    Sig: Take 2,000 Units by mouth    Class: Historical    Route: Oral    fexofenadine (ALLEGRA) 180 MG tablet  90 tablet 0 6/6/2011 --   [facility][id number] [address] AT [facility] [address]    Sig: Take  by mouth. 1 TABLET DAILY FOR ALLERGIES    Class: E-Prescribe    Route: Oral    GLUCOSAMINE-CHONDROITIN PO  -- --  --       Class: Historical    Route: Oral    magnesium oxide 400 MG CAPS  -- --  --       Sig: Take by mouth daily    Class: Historical    Route: Oral    Misc Natural Products (OSTEO BI-FLEX JOINT SHIELD PO)  -- --  --   [facility]/pharmacy #[id number] - [address]    Sig: Take by mouth daily    Class: Historical    Route: Oral    Multiple Vitamin (MULTIVITAMINS PO)  -- --  --       Class: Historical    Route: Oral    Omega-3 Fatty Acids (OMEGA-3 FISH OIL PO)  -- --  --       Class: Historical    Route: Oral            ALLERGIES:   Dust mites, No clinical screening - see comments, No known drug allergy, Ragweeds, and Seasonal allergies    ROS:  Negative unless otherwise stated in HPI.      Physical Examination:   VITALS:   /81 (BP Location: Left arm, Patient Position: Chair, Cuff Size: Adult Large)   Pulse 85   SpO2 95%     Right leg:  Medial joint line tenderness. Medial femoral condyle  tenderness.  Negative anterior posterior medial and lateral drawer   Sensation intact  PT 2+  Popliteal 1+  Plantar dorsiflexion 5/5    Left leg:  Negative anterior posterior medial and lateral drawer  Medial joint line and medial femoral condyle.  Sensation intact   PT 2+  Popliteal 1+  Plantar dorsiflexion 5/5    Normal gait.       Labs:    BMP RESULTS:  Lab Results   Component Value Date     10/15/2024     02/28/2020    POTASSIUM 4.5 10/15/2024    POTASSIUM 3.8 02/28/2020    CHLORIDE 103 10/15/2024    CHLORIDE 107 02/28/2020    CO2 25 10/15/2024    CO2 24 02/28/2020    ANIONGAP 10 10/15/2024    ANIONGAP 8 02/28/2020    GLC 98 10/15/2024    GLC 91 09/15/2020    BUN 13.1 10/15/2024    BUN 9 02/28/2020    CR 0.63 10/15/2024    CR 0.60 02/28/2020    GFRESTIMATED >90 10/15/2024    GFRESTIMATED >90 02/28/2020    GFRESTBLACK >90 02/28/2020    MYKE 9.9 10/15/2024    MYKE 9.1 02/28/2020        CBC RESULTS:  Lab Results   Component Value Date    WBC 6.8 10/15/2024    WBC 10.5 03/28/2019    RBC 4.74 10/15/2024    RBC 4.87 03/28/2019    HGB 13.9 10/15/2024    HGB 14.1 03/28/2019    HCT 42.3 10/15/2024    HCT 42.2 03/28/2019    MCV 89 10/15/2024    MCV 87 03/28/2019    MCH 29.3 10/15/2024    MCH 29.0 03/28/2019    MCHC 32.9 10/15/2024    MCHC 33.4 03/28/2019    RDW 12.7 10/15/2024    RDW 13.1 03/28/2019     10/15/2024     03/28/2019       INR/PTT:  Lab Results   Component Value Date    INR 0.99 10/15/2024    PTT 28 10/15/2024       Diagnostic studies:   Bilateral knee radiograph 8/6/2024:  Right: Moderate medial compartment joint space narrowing. Mild degenerative changes of the lateral and patellofemoral compartments. No joint effusion.    Left: Moderate medial compartment joint space narrowing. Mild degenerative changes of the lateral and patellofemoral compartments. No joint effusion.     Assessment   Siri Vyas is a 53-year-old female with past medical history of pulmonary embolism,  cholecystitis, status post cholecystectomy, and bilateral knee osteoarthritis, presenting today for possible eligibility for the geniculate artery embolization clinical trial.    We discussed that this is a randomly controlled study. All of the patients involved in the study will follow with a sports medicine doctor who specializes in OA. The standard of of care is to follow with sports medicine and to follow their recommendations. One arm of the study will be patients who do the standard of care. The second arm is standard of care plus GAE. The goal of the study is to elucidate if the GAE is effective vs placebo and to elucidate the mechanism of action. We believe that OA worsenes with a cycle of inflammation causing new abnormal vessel growth, which then worsens the inflammation. The durability of pain relief may be between months to 2 years.  Repeat treatment may be possible, if symptoms recur.    We discussed the procedure involves accessing the artery in the contralateral groin artery and guiding a catheter to the arteries of the knee. Once we identify the abnormal vessels, we inject particles to block those vessels. Risks include non-target embolization, skin injury, and nerve injury. There is risk of injuring the vessel that is accessed in the groin.  The procedure is a same-day procedure under moderate sedation. You will stay 3-6 hours after the procedure to recover. After you go home, you should be able to perform the normal daily activities. The knee will feel stiff and sore for several days before starting to feel better.     Plan   Patient is eligible to enroll in the study, if she would like to participate.      Natalie Prather, DO  Interventional Radiology  PGY-6  [phone]      CC  Patient Care Team:  Chapito Coleman MD as PCP - General (Family Practice)  Chapito Coleman MD as Assigned PCP  Nick Sterling MD as MD (Occupational Medicine)  Saima Macdonald MD as MD (Physical Medicine and  "Rehabilitation)  Marlena Johnson MD as Physician (Neurology)  Geneva Pantoja NP as Assigned Rheumatology Provider  GREGORY MCLAUGHLIN      {AMBULATORY ATTESTATION (Optional):492922}        There are no diagnoses linked to this encounter.     {Tests, documents, or independent historian(s) (Optional):422366}  {Independent interpretation of tests (Optional):850705::\"Independent interpretation of a test performed by another physician/other qualified health care professional (not separately reported) - ***\"}  {Discussion of management or test interpretation (Optional):739970::\"Discussion of management or test interpretation with external physician/other qualified healthcare professional/appropriate source - ***\"}  {Diagnosis or treatment significantly limited by social determinants (optional):110371::\"Diagnosis or treatment significantly limited by social determinants of health - ***\"}     {2021 E&M time:064078}               Informed Consent Process Documentation    Study Name: Geniculate Artery Embolization     ICF Version Date / IRB Approval Date:  1/18/2024    Date of Approach/Consent: Oct 15, 2024       The subject was screened and meets all of the inclusion criteria and none of the exclusion criteria is met.      The subject was told:  -that the study involves research   -the purpose of the research study  -the expected duration of the study and the approximate number of subject sought  -of procedures that are identified as experimental  -of reasonably foreseeable risks or discomforts to the subject  -of any benefits to the subject or others that may be expected from the research  -of alternative procedures and/or treatment  -how the confidentiality of records would be maintained  -whether or not compensation and medical treatments are available should injury occur as a result of the study  -who to contact if they have questions related to the research study or questions regarding research subjects' " rights  -that participation is completely voluntary and that their decision to or not to participate will have no impact on their relationships with the [facility] and the staff    No study procedures were completed prior to the consent being obtained.  The use of historical information (lab or assessments) used for the purpose of the study was approved by subject.  The subject was fully aware that we would be reviewing their medical record for the study.    The subject demonstrated an understanding of what the study involved.  Specifically, how this study differed from standard of care at our center and what was required of the subject as part of the study.    The subject reviewed the consent form and was given the opportunity to ask questions before signing.  Questions and concerns were answered by the study staff and/or study physician.    A copy of the signed informed consent document was provided to the subject.  [x] Yes [] No    The subject was offered a copy of the signed informed consent but declined. [] Yes [x] No    The consent required the use of a :   [] Yes [x]  No       A 'short form' consent was used:     [] Yes [x] No         If yes, provide name of witness:     The subject required a legally-authorized representative (LAR) to sign on their behalf:                                                    [] Yes  [x] No   If yes, please record name of LAR:     Questions to Evaluate Subject Comprehension of Study:    Question: Adequate Response? If No, explain what actions were taken   What is being studied? [x] Yes  [] No   If you participate, what will be different than if you decide not to participate?  [x] Yes  [] No   How long will the study last; will you be required to return for visits?  [x] Yes  [] No   What kinds of risks are there?  [x] Yes  [] No   Do you understand that you can withdraw consent at any time and for any reason while participating in the study?   [x] Yes  [] No       Study  Coordinator:              Avelina Blanco                        :             Again, thank you for allowing me to participate in the care of your patient.      Sincerely,    Bruce Smith MD

## 2024-10-15 NOTE — PATIENT INSTRUCTIONS
Siri you have had your consult today with Dr Smith regarding GAE study.     Plan:    The research coordinator will reach out to you for next steps.     Thanks,     CARMEN Oglesby RN, BSN  Interventional Radiology Care Coordinator   Phone:  [phone]

## 2024-10-17 ENCOUNTER — OFFICE VISIT (OUTPATIENT)
Dept: NEUROLOGY | Facility: CLINIC | Age: 54
End: 2024-10-17
Attending: NURSE PRACTITIONER
Payer: COMMERCIAL

## 2024-10-17 DIAGNOSIS — R20.2 NUMBNESS AND TINGLING IN RIGHT HAND: ICD-10-CM

## 2024-10-17 DIAGNOSIS — R20.0 NUMBNESS AND TINGLING IN RIGHT HAND: ICD-10-CM

## 2024-10-17 PROCEDURE — 95886 MUSC TEST DONE W/N TEST COMP: CPT | Performed by: STUDENT IN AN ORGANIZED HEALTH CARE EDUCATION/TRAINING PROGRAM

## 2024-10-17 PROCEDURE — 95908 NRV CNDJ TST 3-4 STUDIES: CPT | Performed by: STUDENT IN AN ORGANIZED HEALTH CARE EDUCATION/TRAINING PROGRAM

## 2024-10-17 NOTE — PROCEDURES
[facility]  Electrodiagnostic Laboratory                 Department of Neurology                                                                                                         Test Date:  10/17/2024    Patient: Siri Vyas  : 1970 Physician: Marlena Johnson MD   Sex: Female AGE: 53 year Ref Phys: Jackson NERY Pantoja   ID#: [id number]   Technician: Mack Turcios     History and Examination:  53-year-old right-handed female with history of left cubital tunnel syndrome s/p ulnar nerve release surgery presenting with numbness in the right fifth digit.  This study was performed to assess for ulnar neuropathy.    Techniques:  Motor and sensory conduction studies were done with surface recording electrodes. Temperature was monitored and recorded throughout the study. Upper extremities were maintained at a temperature of 32 degrees Centigrade or higher.  EMG was done with a concentric needle electrode.     Results:  Nerve conduction studies showed borderline reduced right ulnar sensory nerve action potential amplitude.  Right ulnar compound muscle action potential amplitude was normal.  However, a focal slowing of conduction velocity was noted across the elbow.  On inching study, the focal slowing was most significant at 2 locations: 1) between 2-4 cm distal to the medial epicondyl and 2) between the medial epicondyle and 2 cm proximal to it.  Right median sensory nerve and compound muscle action potentials were normal.  Right median-ulnar palmar latency difference was within normal limit.    Needle EMG of the right upper limb showed reduced recruitment of long-duration motor unit potentials in the right FDI and ADM muscles.  Needle EMG of the right cervical paraspinal muscle was normal.    Interpretation:  This is an abnormal study.  There is electrophysiologic evidence of a chronic right ulnar neuropathy at the elbow (approximately between 2-4 cm distal to the medial  epicondyl as well as between the medial epicondyle and 2 cm proximal to it). There is no electrophysiologic evidence of a right cervical radiculopathy or median neuropathy in the current study.     Marlena Johnson MD  Department of Neurology        Nerve Conduction Studies  Motor Sites      Latency Neg. Amp Neg. Amp Diff Segment Distance Velocity Neg. Dur Neg Area Diff Temperature Comment   Site (ms) Norm (mV) Norm (%)  cm m/s Norm (ms) (%) ( C)    Right Median (APB) Motor   Wrist 3.1  < 4.4 11.6  > 5.0  Wrist-APB 8   4.2  31.6    Elbow 6.5 - 10.6  > 5.0 -9 Elbow-Wrist 18.5 54  > 48 4.4 -6 31.9    Right Ulnar (ADM) Motor   Wrist 2.8  < 3.5 9.3  > 5.0  Wrist-ADM 8   5.2  33.4    Below Elbow 5.9 - 9.0 - -3 Below Elbow-Wrist 17 55  > 48 5.2 -4 32.3    Above Elbow 8.4 - 8.5 - -6 Above Elbow-Below Elbow 9 36  > 48 5.5 0 32.3    Right Ulnar-Inching (ADM) Motor   Wrist 2.8  < 3.5 9.3  > 5.0  Wrist-ADM 8   5.2  33.4    Elb-6 5.1 - 8.2 -      5.2  32.9    Elb-4 5.4 - 8.3 - 1 Elb-4-Elb-6 2 67 - 5.1 -1 32.9    Elb-2 6.0 - 8.2 - -1 Elb-2-Elb-4 2 33 - 5.4 5 33.1    Elbow 6.4 - 8.4 - 2 Elbow-Elb-2 2 50 - 5.3 0 33.3    Elb+2 7.8 - 8.0 - -5 Elb+2-Elbow 2 14 - 5.3 -4 32.8    Elb+4 8.1 - 8.0 - 0 Elb+4-Elb+2 2 67 - 5.3 -1 32.8      Sensory Sites      Onset Lat Peak Lat Amp (O-P) Amp (P-P) Segment Distance Velocity Temperature Comment   Site ms (ms)  V Norm ( V)  cm m/s Norm ( C)    Right Median Sensory   Wrist-Dig II 2.2 2.9 45  > 10 56 Wrist-Dig II 14 64  > 48 32.4    Right Median-Ulnar Palmar Sensory        Median   Palm-Wrist 1.25 1.73 70 - 76 Palm-Wrist 8 64 - 32.4         Ulnar   Palm-Wrist 1.15 1.80 8 - 6 Palm-Wrist 8 70 - 32.5    Right Ulnar Sensory   Wrist-Dig V 2.0 2.7 8  > 8 17 Wrist-Dig V 12.5 63  > 48 32.4      Inter-Nerve Comparisons     Nerve 1 Value 1 Nerve 2 Value 2 Parameter Result Normal   Sensory Sites   R Median Palm-Wrist 1.73 ms R Ulnar Palm-Wrist 1.80 ms Peak Lat Diff 0.07 ms <0.40       Electromyography      Side Muscle Ins Act Fibs/PSW Fasc HF Amp Dur Poly Recrt Int Pat   Right FDI Nml None Nml 0 Nml 1+ 0 Padmini Nml   Right FPL Nml None Nml 0 Nml Nml 0 Nml Nml   Right FCU Nml None Nml 0 Nml Nml 0 Nml Nml   Right ADM Nml None Nml 0 1+ 2+ 0 Padmini Nml   Right Cervical PSP Nml None Nml 0 Nml Nml 0 Nml Nml         NCS Waveforms:    Motor           Sensory

## 2024-10-17 NOTE — LETTER
10/17/2024       RE: Siri Vyas  [address]  [facility] [address]     Dear Colleague,    Thank you for referring your patient, Siri Vyas, to the [facility]. Please see a copy of my visit note below.    See procedure note.       Again, thank you for allowing me to participate in the care of your patient.      Sincerely,    Marlena Johnson MD

## 2024-10-22 ENCOUNTER — ANCILLARY PROCEDURE (OUTPATIENT)
Dept: GENERAL RADIOLOGY | Facility: CLINIC | Age: 54
End: 2024-10-22
Attending: RADIOLOGY
Payer: COMMERCIAL

## 2024-10-22 ENCOUNTER — ANCILLARY PROCEDURE (OUTPATIENT)
Dept: GENERAL RADIOLOGY | Facility: CLINIC | Age: 54
End: 2024-10-22
Payer: COMMERCIAL

## 2024-10-22 ENCOUNTER — OFFICE VISIT (OUTPATIENT)
Dept: FAMILY MEDICINE | Facility: CLINIC | Age: 54
End: 2024-10-22
Payer: COMMERCIAL

## 2024-10-22 VITALS
OXYGEN SATURATION: 96 % | BODY MASS INDEX: 38.44 KG/M2 | TEMPERATURE: 97.9 F | WEIGHT: 217 LBS | DIASTOLIC BLOOD PRESSURE: 70 MMHG | HEART RATE: 78 BPM | SYSTOLIC BLOOD PRESSURE: 110 MMHG | RESPIRATION RATE: 20 BRPM

## 2024-10-22 DIAGNOSIS — M19.90 OSTEOARTHRITIS: ICD-10-CM

## 2024-10-22 DIAGNOSIS — R05.8 PRODUCTIVE COUGH: ICD-10-CM

## 2024-10-22 DIAGNOSIS — Z28.21 IMMUNIZATION DECLINED: ICD-10-CM

## 2024-10-22 DIAGNOSIS — J18.9 PNEUMONIA OF RIGHT LOWER LOBE DUE TO INFECTIOUS ORGANISM: Primary | ICD-10-CM

## 2024-10-22 DIAGNOSIS — Z23 NEED FOR HEPATITIS B BOOSTER VACCINATION: ICD-10-CM

## 2024-10-22 DIAGNOSIS — J18.9 PNEUMONIA OF RIGHT LOWER LOBE DUE TO INFECTIOUS ORGANISM: ICD-10-CM

## 2024-10-22 PROBLEM — E66.01 MORBID OBESITY (H): Status: RESOLVED | Noted: 2019-02-18 | Resolved: 2024-10-22

## 2024-10-22 PROBLEM — E66.01 CLASS 2 SEVERE OBESITY DUE TO EXCESS CALORIES WITH SERIOUS COMORBIDITY IN ADULT (H): Status: ACTIVE | Noted: 2024-10-22

## 2024-10-22 PROBLEM — E66.812 CLASS 2 SEVERE OBESITY DUE TO EXCESS CALORIES WITH SERIOUS COMORBIDITY IN ADULT (H): Status: ACTIVE | Noted: 2024-10-22

## 2024-10-22 PROCEDURE — 90471 IMMUNIZATION ADMIN: CPT

## 2024-10-22 PROCEDURE — 71046 X-RAY EXAM CHEST 2 VIEWS: CPT | Mod: TC | Performed by: RADIOLOGY

## 2024-10-22 PROCEDURE — 99213 OFFICE O/P EST LOW 20 MIN: CPT | Mod: 25

## 2024-10-22 PROCEDURE — 73562 X-RAY EXAM OF KNEE 3: CPT | Mod: TC | Performed by: RADIOLOGY

## 2024-10-22 PROCEDURE — 90746 HEPB VACCINE 3 DOSE ADULT IM: CPT

## 2024-10-22 RX ORDER — BENZONATATE 200 MG/1
200 CAPSULE ORAL 3 TIMES DAILY PRN
Qty: 30 CAPSULE | Refills: 0 | Status: SHIPPED | OUTPATIENT
Start: 2024-10-22

## 2024-10-22 RX ORDER — GUAIFENESIN 600 MG/1
1200 TABLET, EXTENDED RELEASE ORAL 2 TIMES DAILY
Qty: 20 TABLET | Refills: 0 | Status: SHIPPED | OUTPATIENT
Start: 2024-10-22 | End: 2024-10-27

## 2024-10-22 ASSESSMENT — PAIN SCALES - GENERAL: PAINLEVEL: NO PAIN (0)

## 2024-10-22 NOTE — PROGRESS NOTES
"  Assessment & Plan     Pneumonia of right lower lobe due to infectious organism  Productive cough  - lingering productive cough, O2 96% on RA, NAD  - will recheck chest x-ray today after joint decision making with pt   - XR Chest 2 Views  - treat cough supportively  - benzonatate (TESSALON) 200 MG capsule  Dispense: 30 capsule; Refill: 0  - guaiFENesin (MUCINEX) 600 MG 12 hr tablet  Dispense: 20 tablet; Refill: 0   - The uses and side effects, including black box warnings as appropriate, were discussed in detail.  All patient questions were answered.  The patient was instructed to call immediately if any side effects developed.  - discussed supportive measures for symptom management: rest, hydration, netti pot / saline flush prn, tea w/ honey, humidifier, cough drops   - advised if symptoms worsen, including development of fevers, chills, dyspnea, chest pain, wheezing, or any other worrisome symptoms to present to ER/UC prn     Need for hepatitis B booster vaccination  - HEPATITIS B VACCINE (20 YEARS AND OLDER) (ENGERIX-B/RECOMBIVAX)    Immunization declined  - declines covid19, shingles, and influenza vaccines today     MED REC REQUIRED  Post Medication Reconciliation Status: reconciled    BMI  Estimated body mass index is 38.44 kg/m  as calculated from the following:    Height as of 7/31/24: 1.6 m (5' 3\").    Weight as of this encounter: 98.4 kg (217 lb).     RTC prn. The patient verbalized understanding and agreement with the plan today and has no additional questions or concerns at this time.    Jayne Horner is a 53 year old, presenting for the following health issues:  Hospital F/U (10/07/2024)        10/22/2024     6:58 AM   Additional Questions   Roomed by Melvi   Accompanied by self         10/22/2024     6:58 AM   Patient Reported Additional Medications   Patient reports taking the following new medications none     History of Present Illness       Reason for visit:  Follow up    She eats 2-3 " servings of fruits and vegetables daily.She consumes 0 sweetened beverage(s) daily.She exercises with enough effort to increase her heart rate 30 to 60 minutes per day.  She exercises with enough effort to increase her heart rate 4 days per week.   She is taking medications regularly.     ED/UC Followup:    Facility:  [facility]  Date of visit: 10/07/2024  Reason for visit: COUGH  Current Status: reports has lingering cough that seems improved. Denies dyspnea, chest tightness or wheezing, fevers/chills, ear pain. Does get some sinus pressure and headaches but these are better than before. She completed the full course of azithromycin and amoxicillin. The current cough is still dry and productive. Has tried cough drops, Tylenol.     Review of Systems  Constitutional, HEENT, cardiovascular, pulmonary, GI, , musculoskeletal, neuro, skin, endocrine and psych systems are negative, except as otherwise noted.      Objective    /70 (BP Location: Left arm, Patient Position: Sitting, Cuff Size: Adult Large)   Pulse 78   Temp 97.9  F (36.6  C) (Oral)   Resp 20   Wt 98.4 kg (217 lb)   LMP 10/20/2024 (Exact Date)   SpO2 96%   BMI 38.44 kg/m    Body mass index is 38.44 kg/m .  Physical Exam     General:  alert, oriented, pleasant and conversant. NAD. Non-toxic  HEENT:  normocephalic, atraumatic. Sclera white, conjunctiva clear, EOMs intact. PERRL. TMs grey, cone of light and bony landmarks visualized bilaterally. Nares patent. OP w/o erythema, edema or lesions.  Normal dentition.  Neck:  supple, FROM; no thyromegaly, lymphadenopathy, or masses   Chest: chest expansion symmetrical bilaterally, lung sounds clear without wheezes, rhonchi or rales  CV: S1, S2, RRR without murmurs, rubs or gallops. +productive cough.   MSK: steady gait, FROM  Derm: no rashes, lesions, or ulcers. No erythema, induration or nodules  Neuro: CNII-XII grossly intact, clear and logical thought and speech  Psych:  cooperative, calm mood and  congruent affect    Signed Electronically by: QUANG Kumar CNP

## 2024-10-22 NOTE — PATIENT INSTRUCTIONS
At [facility], we strive to deliver an exceptional experience to you, every time we see you. If you receive a survey, please let us know what we are doing well and/or what we could improve upon, as we do value your feedback.  If you have MyChart, you can expect to receive results automatically within 24 hours of their completion.  Your provider will send a note interpreting your results as well.   If you do not have MyChart, you should receive your results in about a week by mail.    Your care team:                            Family Medicine Internal Medicine   MD Nathaniel Piña, MD Kait Rao, MD Lenin Rangel, MD Donna Kwan, PAItzC    Chapito Coleman, MD Pediatrics   Denisa Sosa, MD Kera Martinez, MD Kay Mayen, APRN CNP Sandhya Peacock APRN CNP   MD Siria Moulton, MD Neyda Frost, CNP     Cory Park, CNP Same-Day Provider (No follow-up visits)   QUANG Aaron, DNP Cristina Go, QUANG Davis, FNP, BC RAJINDER BoydC     Clinic hours: Monday - Thursday 7 am-6 pm; Fridays 7 am-5 pm.   Urgent care: Monday - Friday 10 am- 8 pm; Saturday and Sunday 9 am-5 pm.    Clinic: [phone]       [facility]: Monday - Thursday 8 am - 7 pm; Friday 8 am - 6 pm  [facility]: [phone]

## 2024-10-23 ENCOUNTER — TELEPHONE (OUTPATIENT)
Dept: VASCULAR SURGERY | Facility: CLINIC | Age: 54
End: 2024-10-23

## 2024-10-23 NOTE — TELEPHONE ENCOUNTER
----- Message from Avelina WHALEY sent at 10/22/2024  7:37 AM CDT -----  Regarding: RE: GAE subject complete screening procedures  Maciej Carr,   Yes please do  Avelina  ----- Message -----  From: Bruno Torres  Sent: 10/21/2024   4:46 PM CDT  To: Avelina Blanco; Wendy Oglesby, RN  Subject: RE: GAE subject complete screening procedures    Ladies am I scheduling this pt for:?  Cytokine labs (just request a general venipuncture as research procedure)   - serum hcg lab   - JODY non-exercise   - 3 view X ray left knee   Bruno Torres on 10/21/2024 at 4:46 PM  ----- Message -----  From: Wendy Oglesby, RN  Sent: 10/16/2024  10:45 AM CDT  To: Avelina Blanco; Bruno Torres  Subject: RE: GAE subject complete screening procedures    All those orders were placed on 9/23 with your original request Verena Carr please respond to all on this msg after appts are made.  Due anytime    Thanks,     ABennett Oglesby, RN, BSN  Interventional Radiology Care Coordinator   Phone:  [phone]  ----- Message -----  From: Avelina Blanco  Sent: 10/16/2024  10:37 AM CDT  To: Wendy Oglesby, ELEUTERIO; Bruno Torres  Subject: GAKHURRAM subject complete screening procedures        Adenike Do,  This woman who visited yesterday for consent/labs requires the following procedures rescheduled (anytime as she has now consented)   - Cytokine labs (just request a general venipuncture as research procedure)   - serum hcg lab   - JODY non-exercise   - 3 view X ray left knee   - MRI if has not already been ordered[facility]   Please let me know when you have these ordered/scheduled so I can attend with the patient.   All the best,   Avelina

## 2024-10-23 NOTE — TELEPHONE ENCOUNTER
Left Voicemail (1st Attempt) and Sent [employer] (1st Attempt) for the patient to call back and schedule the following:Lab imaging & Xray.     Location: [facility]  Ordering Provider: Dr. Bruce Smith  Appointment type: Lab & Imaging to be done at the [facility].  Appointment mode: In Person  Return date: Next Available     Specialty phone number: [phone] or [phone]    Is Imaging Needed: Yes, US and Yes, X-Ray   Is lab needed:Yes     Additional Notes: Lab & Imaging to be done at the [facility].    -Bruno Torres on 10/23/2024 at 2:38 PM

## 2024-10-24 DIAGNOSIS — Z01.812 BLOOD TESTS PRIOR TO TREATMENT OR PROCEDURE: Primary | ICD-10-CM

## 2024-10-28 DIAGNOSIS — G56.21 CUBITAL TUNNEL SYNDROME ON RIGHT: Primary | ICD-10-CM

## 2024-10-29 ENCOUNTER — TELEPHONE (OUTPATIENT)
Dept: PHYSICAL MEDICINE AND REHAB | Facility: CLINIC | Age: 54
End: 2024-10-29

## 2024-10-29 ENCOUNTER — PATIENT OUTREACH (OUTPATIENT)
Dept: CARE COORDINATION | Facility: CLINIC | Age: 54
End: 2024-10-29

## 2024-10-29 NOTE — TELEPHONE ENCOUNTER
Date: October 29, 2024    Provider: SUZANNE     Provider/Other: Any Available [employer] SUZANNE    Reason for out-going call: RESCHEDULE      Detailed message: Left voicemail to call back and reschedule appointment on 10/30/24 with Anabell Meade to any available SUZANNE at the [place] [facility]. Rescheduled appointments will have to be IN-CLINIC FOR A 40 MINS VISIT.          Number provider for patient:  SPINE CLINIC: [phone]

## 2024-10-31 ENCOUNTER — PATIENT OUTREACH (OUTPATIENT)
Dept: CARE COORDINATION | Facility: CLINIC | Age: 54
End: 2024-10-31
Payer: COMMERCIAL

## 2024-11-01 NOTE — TELEPHONE ENCOUNTER
REASON FOR VISIT: Cubital tunnel syndrome on right    DATE OF APPT: 11/15/2024   NOTES (FOR ALL VISITS) STATUS DETAILS   [facility] 10/[facility]/20[facility]   EMG Internal [facility]  EMG 10/17/2024   MEDICATION LIST Internal    IMAGING  (FOR ALL VISITS)     XR N/A    MRI (HEAD, NECK, SPINE) N/A    CT (HEAD, NECK, SPINE) N/A

## 2024-11-05 DIAGNOSIS — M19.90 OSTEOARTHRITIS: ICD-10-CM

## 2024-11-05 DIAGNOSIS — Z01.812 BLOOD TESTS PRIOR TO TREATMENT OR PROCEDURE: Primary | ICD-10-CM

## 2024-11-06 NOTE — TELEPHONE ENCOUNTER
REASON FOR VISIT: Cubital tunnel syndrome on right    DATE OF APPT: 12/20/2024   NOTES (FOR ALL VISITS) STATUS DETAILS   OFFICE NOTE from referring provider Internal [facility] Geneva Suazo NP 10/28/2024   EMG Internal [facility]  EMG 10/17/2024   MEDICATION LIST Internal    IMAGING  (FOR ALL VISITS)     XR N/A    MRI (HEAD, NECK, SPINE) N/A    CT (HEAD, NECK, SPINE) N/A

## 2024-11-11 ENCOUNTER — LAB (OUTPATIENT)
Dept: LAB | Facility: CLINIC | Age: 54
End: 2024-11-11
Payer: COMMERCIAL

## 2024-11-11 ENCOUNTER — ANCILLARY PROCEDURE (OUTPATIENT)
Dept: ULTRASOUND IMAGING | Facility: CLINIC | Age: 54
End: 2024-11-11
Attending: RADIOLOGY
Payer: COMMERCIAL

## 2024-11-11 DIAGNOSIS — M19.90 OSTEOARTHRITIS: ICD-10-CM

## 2024-11-11 DIAGNOSIS — Z01.812 BLOOD TESTS PRIOR TO TREATMENT OR PROCEDURE: ICD-10-CM

## 2024-11-11 LAB
BASOPHILS # BLD AUTO: 0.1 10E3/UL (ref 0–0.2)
BASOPHILS NFR BLD AUTO: 1 %
EOSINOPHIL # BLD AUTO: 0.1 10E3/UL (ref 0–0.7)
EOSINOPHIL NFR BLD AUTO: 2 %
ERYTHROCYTE [DISTWIDTH] IN BLOOD BY AUTOMATED COUNT: 12.7 % (ref 10–15)
HCG SERPL QL: NEGATIVE
HCT VFR BLD AUTO: 39.8 % (ref 35–47)
HGB BLD-MCNC: 13.4 G/DL (ref 11.7–15.7)
IMM GRANULOCYTES # BLD: 0 10E3/UL
IMM GRANULOCYTES NFR BLD: 0 %
LYMPHOCYTES # BLD AUTO: 2.6 10E3/UL (ref 0.8–5.3)
LYMPHOCYTES NFR BLD AUTO: 34 %
MCH RBC QN AUTO: 29.8 PG (ref 26.5–33)
MCHC RBC AUTO-ENTMCNC: 33.7 G/DL (ref 31.5–36.5)
MCV RBC AUTO: 89 FL (ref 78–100)
MONOCYTES # BLD AUTO: 0.7 10E3/UL (ref 0–1.3)
MONOCYTES NFR BLD AUTO: 8 %
NEUTROPHILS # BLD AUTO: 4.4 10E3/UL (ref 1.6–8.3)
NEUTROPHILS NFR BLD AUTO: 55 %
NRBC # BLD AUTO: 0 10E3/UL
NRBC BLD AUTO-RTO: 0 /100
PLATELET # BLD AUTO: 312 10E3/UL (ref 150–450)
RBC # BLD AUTO: 4.49 10E6/UL (ref 3.8–5.2)
WBC # BLD AUTO: 7.9 10E3/UL (ref 4–11)

## 2024-11-15 ENCOUNTER — PRE VISIT (OUTPATIENT)
Dept: ORTHOPEDICS | Facility: CLINIC | Age: 54
End: 2024-11-15

## 2024-12-02 ENCOUNTER — OFFICE VISIT (OUTPATIENT)
Dept: RHEUMATOLOGY | Facility: CLINIC | Age: 54
End: 2024-12-02
Payer: COMMERCIAL

## 2024-12-02 VITALS
DIASTOLIC BLOOD PRESSURE: 84 MMHG | WEIGHT: 220.5 LBS | BODY MASS INDEX: 39.06 KG/M2 | OXYGEN SATURATION: 100 % | HEART RATE: 78 BPM | SYSTOLIC BLOOD PRESSURE: 120 MMHG | TEMPERATURE: 97.5 F

## 2024-12-02 DIAGNOSIS — G89.29 CHRONIC PAIN OF BOTH KNEES: ICD-10-CM

## 2024-12-02 DIAGNOSIS — M35.00 SICCA COMPLEX (H): ICD-10-CM

## 2024-12-02 DIAGNOSIS — R76.8 POSITIVE ANA (ANTINUCLEAR ANTIBODY): ICD-10-CM

## 2024-12-02 DIAGNOSIS — M25.561 CHRONIC PAIN OF BOTH KNEES: ICD-10-CM

## 2024-12-02 DIAGNOSIS — Z86.711 HISTORY OF PULMONARY EMBOLUS (PE): ICD-10-CM

## 2024-12-02 DIAGNOSIS — M25.562 CHRONIC PAIN OF BOTH KNEES: ICD-10-CM

## 2024-12-02 DIAGNOSIS — M35.00 PRIMARY SJOGREN'S SYNDROME (H): Primary | ICD-10-CM

## 2024-12-02 DIAGNOSIS — M17.0 PRIMARY OSTEOARTHRITIS OF BOTH KNEES: ICD-10-CM

## 2024-12-02 DIAGNOSIS — R76.8 ELEVATED ANTINUCLEAR ANTIBODY (ANA) LEVEL: ICD-10-CM

## 2024-12-02 DIAGNOSIS — Z82.49 FAMILY HISTORY OF BLOOD CLOTS: ICD-10-CM

## 2024-12-02 DIAGNOSIS — R76.8 SS-A ANTIBODY POSITIVE: ICD-10-CM

## 2024-12-02 LAB
ALBUMIN SERPL BCG-MCNC: 4.8 G/DL (ref 3.5–5.2)
ALT SERPL W P-5'-P-CCNC: 26 U/L (ref 0–50)
AST SERPL W P-5'-P-CCNC: 25 U/L (ref 0–45)
CREAT SERPL-MCNC: 0.59 MG/DL (ref 0.51–0.95)
CRP SERPL-MCNC: 9.05 MG/L
EGFRCR SERPLBLD CKD-EPI 2021: >90 ML/MIN/1.73M2
ERYTHROCYTE [DISTWIDTH] IN BLOOD BY AUTOMATED COUNT: 12.5 % (ref 10–15)
ERYTHROCYTE [SEDIMENTATION RATE] IN BLOOD BY WESTERGREN METHOD: 16 MM/HR (ref 0–30)
HCT VFR BLD AUTO: 45.4 % (ref 35–47)
HGB BLD-MCNC: 15.1 G/DL (ref 11.7–15.7)
MCH RBC QN AUTO: 29.7 PG (ref 26.5–33)
MCHC RBC AUTO-ENTMCNC: 33.3 G/DL (ref 31.5–36.5)
MCV RBC AUTO: 89 FL (ref 78–100)
PLATELET # BLD AUTO: 369 10E3/UL (ref 150–450)
RBC # BLD AUTO: 5.08 10E6/UL (ref 3.8–5.2)
WBC # BLD AUTO: 6.5 10E3/UL (ref 4–11)

## 2024-12-02 PROCEDURE — 84460 ALANINE AMINO (ALT) (SGPT): CPT | Performed by: NURSE PRACTITIONER

## 2024-12-02 PROCEDURE — G2211 COMPLEX E/M VISIT ADD ON: HCPCS | Performed by: NURSE PRACTITIONER

## 2024-12-02 PROCEDURE — 86200 CCP ANTIBODY: CPT | Performed by: NURSE PRACTITIONER

## 2024-12-02 PROCEDURE — 36415 COLL VENOUS BLD VENIPUNCTURE: CPT | Performed by: NURSE PRACTITIONER

## 2024-12-02 PROCEDURE — 99417 PROLNG OP E/M EACH 15 MIN: CPT | Performed by: NURSE PRACTITIONER

## 2024-12-02 PROCEDURE — 86147 CARDIOLIPIN ANTIBODY EA IG: CPT | Performed by: NURSE PRACTITIONER

## 2024-12-02 PROCEDURE — 85027 COMPLETE CBC AUTOMATED: CPT | Performed by: NURSE PRACTITIONER

## 2024-12-02 PROCEDURE — 82040 ASSAY OF SERUM ALBUMIN: CPT | Performed by: NURSE PRACTITIONER

## 2024-12-02 PROCEDURE — 86160 COMPLEMENT ANTIGEN: CPT | Performed by: NURSE PRACTITIONER

## 2024-12-02 PROCEDURE — 86140 C-REACTIVE PROTEIN: CPT | Performed by: NURSE PRACTITIONER

## 2024-12-02 PROCEDURE — 86225 DNA ANTIBODY NATIVE: CPT | Performed by: NURSE PRACTITIONER

## 2024-12-02 PROCEDURE — 82565 ASSAY OF CREATININE: CPT | Performed by: NURSE PRACTITIONER

## 2024-12-02 PROCEDURE — 99215 OFFICE O/P EST HI 40 MIN: CPT | Performed by: NURSE PRACTITIONER

## 2024-12-02 PROCEDURE — 85652 RBC SED RATE AUTOMATED: CPT | Performed by: NURSE PRACTITIONER

## 2024-12-02 PROCEDURE — 84450 TRANSFERASE (AST) (SGOT): CPT | Performed by: NURSE PRACTITIONER

## 2024-12-02 RX ORDER — HYDROXYCHLOROQUINE SULFATE 200 MG/1
200 TABLET, FILM COATED ORAL 2 TIMES DAILY
Qty: 180 TABLET | Refills: 3 | Status: SHIPPED | OUTPATIENT
Start: 2024-12-02

## 2024-12-02 ASSESSMENT — PAIN SCALES - GENERAL: PAINLEVEL_OUTOF10: SEVERE PAIN (6)

## 2024-12-02 NOTE — NURSING NOTE
"Siri Vyas's goals for this visit include:   Chief Complaint   Patient presents with    RECHECK     Elevated antinuclear antibody (DEBBIE) level       PCP: Chapito Coleman    Referring Provider:  Chapito Coleman MD  [address] [place] [address]      Initial /84 (BP Location: Left arm, Patient Position: Sitting, Cuff Size: Adult Large)   Pulse 78   Temp 97.5  F (36.4  C) (Oral)   Wt 100 kg (220 lb 8 oz)   LMP 10/20/2024 (Exact Date)   SpO2 100%   Breastfeeding No   BMI 39.06 kg/m   Estimated body mass index is 39.06 kg/m  as calculated from the following:    Height as of 7/31/24: 1.6 m (5' 3\").    Weight as of this encounter: 100 kg (220 lb 8 oz).    Medication Reconciliation: complete    Do you need any medication refills at today's visit? none    PASQUALE Bassett  Rheumatology/Infectious disease  [facility]   [phone]      "

## 2024-12-02 NOTE — PATIENT INSTRUCTIONS
1) Plaquenil 200 mg twice a day, start with 1 tab daily and if tolerated increase to 1 tab twice a day    2) US of parotids    3) Have eye doctor to [facility] test for sjogren's dx.     4) See me back March, labs 1 week before hand.     5) Labs today of C3, C4, dsDNA, cardio lipin IGG and IGM ,  and CCP juana

## 2024-12-02 NOTE — PROGRESS NOTES
Rheumatology Clinic Visit  Geneva Pantoja, RAMONE      Siri Vyas  YOB: 1970    Age: 54 year old   MRN# [id number]       Date of Visit: 12/02/2024  Primary care provider: Chapito Coleman              Subjective:     Siri is a 53 year old female presents today for follow-up to consult +DEBBIE. Referred by primary, Dr. Chapito Coleman.   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     12/02/2024:  New HA's. Did go to ER for this. Being referred to neurologist for what is thought to be migraine.     Consult 8/01/2024 HPI:   Went to donate blood and found to have HLA juana, her DEBBIE was checked through PCP and primary and was referred.   Back: Has had 5 years back pain that radiates down left buttock to back of thigh is worse when sits down at desk. Is trying to work out. Is getting injections, had second, may get ablation. Did have MRI.   Joints: Knees hurt when she gets up in the morning, knees can be stiff for over an hour. Feels like legs can give out. Has had injections in Knees in past, been awhile, over 4 years ago.     2007 had large work up for PE and was negative and thought to be rt to BC.  ROS: Patient denies recent infections, fevers, LONDON, weight loss, diarrhea, rashes, SOB, mouth sores, frequent miscarriages, sickness in sun, raynauds, skin turning tight. No knowing thyroid dz.   +Dry eye mild - eye  Suggested drops, said they were a little dry. Does wake up at night to drink water.   +PE on BC  +Fatigue  +R 5th digit and sometimes 4th digit numb.        Past Rheum tx:      Social History  [place], , father and children live her.    for [employer] , previous research   No tobacco  ETOH-social  Walking 5-7 a day   Boating, concerts, takes care of dad  Place up [place]    FMH:  Son -ILD antisythatase syndrome, 28 yrs old. PE  Cousin- Lupus  Son with Klinefelters  Daughter 2 blood clots, factor V liden   Factor v liden    Active Problem list:  Patient Active Problem List    Diagnosis Date  Noted    Class 2 severe obesity due to excess calories with serious comorbidity in adult (H) 10/22/2024     Priority: Medium    RLQ abdominal pain 2024     Priority: Medium    Other spondylosis, lumbar region 2024     Priority: Medium    Caregiver role strain 12/10/2021     Priority: Medium    Pain and swelling of right knee 2019     Priority: Medium    Primary osteoarthritis of both knees 10/11/2018     Priority: Medium    Grief reaction 09/10/2018     Priority: Medium    Pain and swelling of left knee 2018     Priority: Medium    Acute left-sided low back pain without sciatica 2018     Priority: Medium    Thrombophlebitis leg 2018     Priority: Medium    Saphenous vein clot, left 2018     Priority: Medium    Family history of malignant neoplasm of breast 03/15/2018     Priority: Medium    Class 2 obesity due to excess calories without serious comorbidity with body mass index (BMI) of 39.0 to 39.9 in adult 03/15/2018     Priority: Medium    Chondromalacia patellae, right 2017     Priority: Medium    Chondromalacia, knee, left 2017     Priority: Medium    Headache 2015     Priority: Medium     Problem list name updated by automated process. Provider to review      CTS (carpal tunnel syndrome) 10/01/2014     Priority: Medium    Tendinitis of left wrist 2014     Priority: Medium    Epicondylitis, medial humeral 2014     Priority: Medium    Median neuropathy 2014     Priority: Medium    Paresthesias/numbness 2014     Priority: Medium     IMO Regulatory Load OCT 2020      LUQ abdominal pain 2012     Priority: Medium    S/P cholecystectomy 2012     Priority: Medium    Dermoid cyst of ovary 2012     Priority: Medium     Left ovary         H/O:  section 2012     Priority: Medium     X 4         CARDIOVASCULAR SCREENING; LDL GOAL LESS THAN 160 10/31/2010     Priority: Medium    Seasonal allergic rhinitis  08/19/2010     Priority: Medium            Objective:     Physical Exam  /84 (BP Location: Left arm, Patient Position: Sitting, Cuff Size: Adult Large)   Pulse 78   Temp 97.5  F (36.4  C) (Oral)   Wt 100 kg (220 lb 8 oz)   LMP 10/20/2024 (Exact Date)   SpO2 100%   Breastfeeding No   BMI 39.06 kg/m    Body mass index is 39.06 kg/m .    Constitutional: Lg body habitus with out gross deformities. Well groomed.   Psych: nl judgement, orientation, memory, affect.  Eyes: wnl EOM, PERRLA, vision. Injected dry conjunctiva  ENT: wnl external ears, nose, hearing, lips, teeth, gums, throat. Dry mucous membrane lesions. Decreased but present saliva pool  Neck: no mass, thyroid enlargement, enlarged cervical lymph nodes or parotid glands  Resp: lungs clear to auscultation, nl work of breathing  CV: RRR, no murmurs, rubs or gallops, no edema  Skin: no nail pitting, alopecia, rash, nodules or lesions of exposed areas of face, neck, bilateral upper and lower extremity   Neuro: Strength WNL of bilateral upper and lower extremity large muscle groups.     MSK- (T=tender to palpation, S=swelling)  TMJ: -T/S, FROM   Cervical spine:  FROM  Shoulders: -T/S, FROM   Elbows: -T/S, FROM   Wrists: -T/S, FROM   Hands: -T/S, FROM, Normal  strength. No dactylitis.   Hips: LROM  Knees: -T/S, crepitus slight limited flexion ROM   Ankles: -T/S, FROM No enthesopathy or tenosynovitis.   Feet: -T/S, FROM . No dactylitis. R 1st MTP large bony change, L small      Labs:    Lab Results   Component Value Date    ALT 17 09/24/2024    AST 18 09/24/2024    CR 0.63 10/15/2024    CRP 10.5 (H) 04/24/2017      Latest Reference Range & Units 06/14/24 16:28   CRP Inflammation <5.00 mg/L 14.20 (H)   Rheumatoid Factor <14 IU/mL <10      Latest Reference Range & Units 06/14/24 16:28   DEBBIE interpretation Negative  Positive !   DEBBIE pattern 1  Speckled   DEBBIE titer 1  1:160      Latest Reference Range & Units 09/24/24 16:25   CRP Inflammation <5.00 mg/L  9.20 (H)      Latest Reference Range & Units 08/06/24 16:11   Histone Antibody,IgG 0.0 - 0.9 Units 1.3 (H)      Latest Reference Range & Units 08/06/24 16:11   Hepatitis C Antibody Nonreactive  Nonreactive      Latest Reference Range & Units 08/06/24 16:11   Beta 2 Glycoprotein 1 Antibody IgG <7.0 U/mL <0.8   Beta 2 Glycoprotein 1 Antibody IgM <7.0 U/mL <2.4   Thyroid Peroxidase Antibody <35 IU/mL <10      Latest Reference Range & Units 09/24/24 16:25   RNP Estefanía IgG Instrument Value <5.0 U/mL 2.0   Smith CANDELARIO Estefanía IgG Instrument Value <7.0 U/mL <0.7   SSA (Ro) Antibody IgG Negative  Positive !   SSA Estefanía IgG Instrument Value <7.0 U/mL 88.0 (H)   SSB (La) Antibody IgG Negative  Negative   SSB Estefanía IgG Instrument Value <7.0 U/mL 3.6     Imaging:   Narrative & Impression   MRI LUMBAR SPINE WITHOUT CONTRAST   6/4/2024 8:09 AM      HISTORY: Chronic low back pain, left posterior pelvic pain and  intermittent left greater than right leg pain; Chronic bilateral low  back pain, unspecified whether sciatica present; Somatic dysfunction  of pelvis region.     TECHNIQUE: Multiplanar multisequence MRI of the lumbar spine without  contrast.     COMPARISON: Lumbar spine MRI 12/21/2021.      FINDINGS:  Alignment is essentially within normal limits. Bone marrow  demonstrates minimal degenerative endplate changes including mild  edema and fatty marrow change (mixed Modic 1/2) most conspicuous  surrounding the L5-S1 disc joint. Conus medullaris is unremarkable  terminating at the level of the L1-2 disc. Cauda equina is  unremarkable. Bilateral sacroiliac joint degenerative change. Small  areas of T2 hyperintense signal within bilateral kidneys, incompletely  characterized, presumably benign.     Segmental Analysis:      L1-L2:  Disc height maintained. No herniation. Mild bilateral facet  arthropathy. No foraminal or spinal canal stenosis.       L2-L3:  Mild disc height loss. Minimal bulge. Mild bilateral facet  arthropathy. No foraminal or  spinal canal stenosis.       L3-L4:  Mild disc height loss. Minimal bulge. Mild bilateral facet  arthropathy. No foraminal or spinal canal stenosis.       L4-L5:  Mild disc height loss. Disc bulge, asymmetric to the right  with shallow right foraminal component, similar compared to the prior.  Moderate bilateral facet arthropathy. Mild right foraminal stenosis.  No left foraminal stenosis. Mild spinal canal stenosis.       L5-S1:  Mild disc height loss. Minimal bulge. Mild right and moderate  left facet arthropathy. No right foraminal stenosis. Mild left  foraminal stenosis. No spinal canal stenosis.                                                                      IMPRESSION:       1. Mild degenerative change as detailed.  2. No high-grade stenoses.     MIKE CHOE MD             Assessment and Plan:     1) +DEBBIE with mild SICCA, knee arthralgia:  Pt was donating blood and received a notification that she had HLA abys, her DEBBIE was checked and was found to be + and referred to rheumatology. CTD ROS + for dry eye mild, slight dry mouth at night, prolonged am stiffness of 1 hour bl knees. General ROS significant fatigue  Will check CANDELARIO and do CBC, CR, UA to access for Sjogren, if SSA SSB negative and CBC, CR, and UA normal would not do further work up for +DEBBIE at this time unless SICCA complex worsens.     Update 12/02/2024:  Symptoms of Sicca complex continues, eye doctor continues to recommend eye drops. Wakes at night needing to drink. Has 30+ min of am stiffness of hands in am. Exam with dry injected conjunctiva, Dry mucous membrane lesions. Decreased but present saliva pool.   Labs;  +SSA 88.0  + histone juana 1.3 (weak +)  +CRP 9.20    Plan: This likely represents Sjogren's. Will do Parotid US, have pt do schimers test at next eye appt. Start HCQ.       2) Hx of PE, son with ILD/ antisynthetase syndrome and PE: LAC negative 2018, will check B2GC and CLA.      3) R ulnar neuropathy: R 5,4 digit numbness  Previous L cubital and carpal tunnel release. EMG Interpretation:  This is an abnormal study.  There is electrophysiologic evidence of a chronic right ulnar neuropathy at the elbow (approximately between 2-4 cm distal to the medial epicondyl as well as between the medial epicondyle and 2 cm proximal to it). There is no electrophysiologic evidence of a right cervical radiculopathy or median neuropathy in the current study.  Sees ortho for consult this month.        Continue with long term management of chronic medical condition.     Pt Instructions:     You likely have Sjogren's.   You have advanced knee OA.     1) Plaquenil 200 mg twice a day, start with 1 tab daily and if tolerated increase to 1 tab twice a day    2) US of parotids    3) Have eye doctor to [facility] test for sjogren's dx.     4) See me back March, labs 1 week before hand.     5) Labs today of C3, C4, dsDNA, cardio lipin IGG and IGM ,  and CCP juana     Pt states understanding and agreement to plan of care, has no further questions.   55 minute spent on the date of the encounter doing chart review, history and exam, documentation and further activities per the note      Geneva Pantoja CNP  Rheumatology, [facility]

## 2024-12-03 LAB
C3 SERPL-MCNC: 170 MG/DL (ref 81–157)
C4 SERPL-MCNC: 35 MG/DL (ref 13–39)
CARDIOLIPIN IGG SER IA-ACNC: <2 GPL-U/ML
CARDIOLIPIN IGG SER IA-ACNC: NEGATIVE
CARDIOLIPIN IGM SER IA-ACNC: <2 MPL-U/ML
CARDIOLIPIN IGM SER IA-ACNC: NEGATIVE
CCP AB SER IA-ACNC: 2.1 U/ML
DSDNA AB SER-ACNC: 3.5 IU/ML

## 2024-12-05 ENCOUNTER — ANCILLARY PROCEDURE (OUTPATIENT)
Dept: ULTRASOUND IMAGING | Facility: CLINIC | Age: 54
End: 2024-12-05
Attending: NURSE PRACTITIONER
Payer: COMMERCIAL

## 2024-12-05 DIAGNOSIS — R76.8 SS-A ANTIBODY POSITIVE: ICD-10-CM

## 2024-12-05 DIAGNOSIS — M35.00 SICCA COMPLEX (H): ICD-10-CM

## 2024-12-05 DIAGNOSIS — R76.8 POSITIVE ANA (ANTINUCLEAR ANTIBODY): ICD-10-CM

## 2024-12-05 PROCEDURE — 76536 US EXAM OF HEAD AND NECK: CPT | Mod: GC | Performed by: RADIOLOGY

## 2024-12-09 ENCOUNTER — MYC MEDICAL ADVICE (OUTPATIENT)
Dept: RHEUMATOLOGY | Facility: CLINIC | Age: 54
End: 2024-12-09
Payer: COMMERCIAL

## 2024-12-19 ENCOUNTER — TRANSFERRED RECORDS (OUTPATIENT)
Dept: HEALTH INFORMATION MANAGEMENT | Facility: CLINIC | Age: 54
End: 2024-12-19
Payer: COMMERCIAL

## 2024-12-20 ENCOUNTER — TELEPHONE (OUTPATIENT)
Dept: ORTHOPEDICS | Facility: CLINIC | Age: 54
End: 2024-12-20

## 2024-12-20 ENCOUNTER — PRE VISIT (OUTPATIENT)
Dept: ORTHOPEDICS | Facility: CLINIC | Age: 54
End: 2024-12-20

## 2024-12-20 NOTE — TELEPHONE ENCOUNTER
Procedure: Right ulnar nerve decompression at the elbow  Facility: [facility]  Length: 90 minutes  Anesthesia: Regional, MAC  Post-op appointments needed: 2 weeks with surgeon or PA, 6 weeks with surgeon only.  Surgery packet/instructions given to patient?  Yes     Raheem Zarate RNCC

## 2024-12-23 PROBLEM — G56.22 ULNAR NEUROPATHY AT ELBOW, LEFT: Status: ACTIVE | Noted: 2024-12-20

## 2024-12-23 NOTE — TELEPHONE ENCOUNTER
Called to schedule surgery with: Dr. Hendrix    Spoke with: Siri     Date of Surgery: 4/16    Estimated Arrival time Discussed with Patient:  No    Location of surgery: [facility]    Pre-operative H&P: patient confirmed they will schedule with primary care clinic    Post-operative Appointment w/SUZANNE or Surgeon: Dr. Hendrix 5/2 at 3:30 PM    Additional Appointments: N/A, no additional visits requested    Discussed with patient pre-op RN will call 2-3 days prior to surgery with arrival time and instructions:  Yes     Standard Ortho Surgery Packet: Yes - was provided to patient during appointment    All patients questions were answered and was instructed to contact the clinic with any questions or concerns.     Additional Comments: N/A and Patient requested to be placed on a cancellation list      Maryanne Rangel on 12/23/2024 at 11:48 AM

## 2024-12-30 ENCOUNTER — THERAPY VISIT (OUTPATIENT)
Dept: PHYSICAL THERAPY | Facility: CLINIC | Age: 54
End: 2024-12-30
Attending: NURSE PRACTITIONER
Payer: COMMERCIAL

## 2024-12-30 DIAGNOSIS — G89.29 CHRONIC PAIN OF BOTH KNEES: Primary | ICD-10-CM

## 2024-12-30 DIAGNOSIS — M25.561 CHRONIC PAIN OF BOTH KNEES: Primary | ICD-10-CM

## 2024-12-30 DIAGNOSIS — M17.0 PRIMARY OSTEOARTHRITIS OF BOTH KNEES: ICD-10-CM

## 2024-12-30 DIAGNOSIS — M25.562 CHRONIC PAIN OF BOTH KNEES: Primary | ICD-10-CM

## 2024-12-30 PROCEDURE — 97110 THERAPEUTIC EXERCISES: CPT | Mod: GP

## 2024-12-30 PROCEDURE — 97161 PT EVAL LOW COMPLEX 20 MIN: CPT | Mod: GP

## 2024-12-30 ASSESSMENT — ACTIVITIES OF DAILY LIVING (ADL)
WEAKNESS: THE SYMPTOM AFFECTS MY ACTIVITY MODERATELY
PUSHING_UP_ON_YOUR_HANDS: A LITTLE BIT OF DIFFICULTY
AS_A_RESULT_OF_YOUR_KNEE_INJURY,_HOW_WOULD_YOU_RATE_YOUR_CURRENT_LEVEL_OF_DAILY_ACTIVITY?: ABNORMAL
PAIN: THE SYMPTOM AFFECTS MY ACTIVITY MODERATELY
PLEASE_INDICATE_YOR_PRIMARY_REASON_FOR_REFERRAL_TO_THERAPY:: KNEE
LIMPING: I HAVE THE SYMPTOM BUT IT DOES NOT AFFECT MY ACTIVITY
WALK: ACTIVITY IS SOMEWHAT DIFFICULT
PLEASE_INDICATE_YOR_PRIMARY_REASON_FOR_REFERRAL_TO_THERAPY:: ELBOW
OPENING_A_JAR: A LITTLE BIT OF DIFFICULTY
PAIN: THE SYMPTOM AFFECTS MY ACTIVITY MODERATELY
TYING_OR_LACING_SHOES: NO DIFFICULTY
USING_TOOLS_OR_APPLIANCES: A LITTLE BIT OF DIFFICULTY
ANY_OF_YOUR_USUAL_WORK,_HOUSEWORK_OR_SCHOOL_ACTIVITIES: MODERATE DIFFICULTY
RISE FROM A CHAIR: ACTIVITY IS MINIMALLY DIFFICULT
SWELLING: I DO NOT HAVE THE SYMPTOM
STAND: ACTIVITY IS SOMEWHAT DIFFICULT
DOING_UP_BUTTONS: NO DIFFICULTY
KNEEL ON THE FRONT OF YOUR KNEE: ACTIVITY IS SOMEWHAT DIFFICULT
THROWING_A_BALL: A LITTLE BIT OF DIFFICULTY
HOW_WOULD_YOU_RATE_THE_OVERALL_FUNCTION_OF_YOUR_KNEE_DURING_YOUR_USUAL_DAILY_ACTIVITIES?: ABNORMAL
UEFI_TOTAL_SCORE: 58
SIT WITH YOUR KNEE BENT: ACTIVITY IS NOT DIFFICULT
KNEE_ACTIVITY_OF_DAILY_LIVING_SUM: 46
GO UP STAIRS: ACTIVITY IS SOMEWHAT DIFFICULT
LIMPING: I HAVE THE SYMPTOM BUT IT DOES NOT AFFECT MY ACTIVITY
STAND: ACTIVITY IS SOMEWHAT DIFFICULT
STIFFNESS: THE SYMPTOM AFFECTS MY ACTIVITY MODERATELY
RISE FROM A CHAIR: ACTIVITY IS MINIMALLY DIFFICULT
RAW_SCORE: 46
STIFFNESS: THE SYMPTOM AFFECTS MY ACTIVITY MODERATELY
LAUNDERING_CLOTHES: NO DIFFICULTY
SIT WITH YOUR KNEE BENT: ACTIVITY IS NOT DIFFICULT
SLEEPING: A LITTLE BIT OF DIFFICULTY
DRESS: NO DIFFICULTY
DRIVING: A LITTLE BIT OF DIFFICULTY
SQUAT: ACTIVITY IS SOMEWHAT DIFFICULT
GO DOWN STAIRS: ACTIVITY IS MINIMALLY DIFFICULT
VACUUMING,_SWEEPING,_OR_RAKING: MODERATE DIFFICULTY
CARRYING_A_SMALL_SUITCASE_WITH_YOUR_AFFECTED_LIMB: MODERATE DIFFICULTY
SQUAT: ACTIVITY IS SOMEWHAT DIFFICULT
YOUR_USUAL_HOBBIES,_RECREATIONAL_OR_SPORTING_ACTIVITIES: MODERATE DIFFICULTY
OPENING_DOORS: A LITTLE BIT OF DIFFICULTY
GO UP STAIRS: ACTIVITY IS SOMEWHAT DIFFICULT
WEAKNESS: THE SYMPTOM AFFECTS MY ACTIVITY MODERATELY
GIVING WAY, BUCKLING OR SHIFTING OF KNEE: THE SYMPTOM AFFECTS MY ACTIVITY SLIGHTLY
PREPARING_FOOD: A LITTLE BIT OF DIFFICULTY
UEFI_TOTAL_SCORE/80: .73
AS_A_RESULT_OF_YOUR_KNEE_INJURY,_HOW_WOULD_YOU_RATE_YOUR_CURRENT_LEVEL_OF_DAILY_ACTIVITY?: ABNORMAL
KNEE_ACTIVITY_OF_DAILY_LIVING_SCORE: 65.71
WASHING_YOUR_HAIR_OR_SCALP: A LITTLE BIT OF DIFFICULTY
GIVING WAY, BUCKLING OR SHIFTING OF KNEE: THE SYMPTOM AFFECTS MY ACTIVITY SLIGHTLY
LIFTING_A_BAG_OF_GROCERIES_TO_WAIST_LEVEL: A LITTLE BIT OF DIFFICULTY
WALK: ACTIVITY IS SOMEWHAT DIFFICULT
HOW_WOULD_YOU_RATE_THE_OVERALL_FUNCTION_OF_YOUR_KNEE_DURING_YOUR_USUAL_DAILY_ACTIVITIES?: ABNORMAL
PLACING_AN_OBJECT_ONTO,_OR_REMOVING_IT_FROM_AN_OVERHEAD_SHELF: A LITTLE BIT OF DIFFICULTY
HOW_WOULD_YOU_RATE_THE_CURRENT_FUNCTION_OF_YOUR_KNEE_DURING_YOUR_USUAL_DAILY_ACTIVITIES_ON_A_SCALE_FROM_0_TO_100_WITH_100_BEING_YOUR_LEVEL_OF_KNEE_FUNCTION_PRIOR_TO_YOUR_INJURY_AND_0_BEING_THE_INABILITY_TO_PERFORM_ANY_OF_YOUR_USUAL_DAILY_ACTIVITIES?: 50
GO DOWN STAIRS: ACTIVITY IS MINIMALLY DIFFICULT
CLEANING: A LITTLE BIT OF DIFFICULTY
SWELLING: I DO NOT HAVE THE SYMPTOM
HOW_WOULD_YOU_RATE_THE_CURRENT_FUNCTION_OF_YOUR_KNEE_DURING_YOUR_USUAL_DAILY_ACTIVITIES_ON_A_SCALE_FROM_0_TO_100_WITH_100_BEING_YOUR_LEVEL_OF_KNEE_FUNCTION_PRIOR_TO_YOUR_INJURY_AND_0_BEING_THE_INABILITY_TO_PERFORM_ANY_OF_YOUR_USUAL_DAILY_ACTIVITIES?: 50
KNEEL ON THE FRONT OF YOUR KNEE: ACTIVITY IS SOMEWHAT DIFFICULT

## 2024-12-30 NOTE — PROGRESS NOTES
PHYSICAL THERAPY EVALUATION  Type of Visit: Evaluation              Subjective         Presenting condition or subjective complaint: (Patient-Rptd) knees and elbows  Date of onset: 09/30/24    Relevant medical history: (Patient-Rptd) Arthritis; Osteoarthritis; Overweight; Severe headaches   Dates & types of surgery:      Prior diagnostic imaging/testing results: (Patient-Rptd) X-ray; EMG     Prior therapy history for the same diagnosis, illness or injury: (Patient-Rptd) Yes      Prior Level of Function  Transfers:   Ambulation:   ADL:   IADL:     Living Environment  Social support: (Patient-Rptd) With family members   Type of home: (Patient-Rptd) House; Multi-level   Stairs to enter the home: (Patient-Rptd) Yes       Ramp: (Patient-Rptd) No   Stairs inside the home: (Patient-Rptd) Yes (Patient-Rptd) 8 Is there a railing: (Patient-Rptd) Yes     Help at home: (Patient-Rptd) None  Equipment owned:       Employment: (Patient-Rptd) Yes (Patient-Rptd)   Hobbies/Interests: (Patient-Rptd) fishing walking    Patient goals for therapy: (Patient-Rptd) exercise    PHYSICAL THERAPY KNEE EVALUATION    SUBJECTIVE:  Siri is a 54 year old female who reports that she has knee pain bilaterally. She was diagnosed with Sjogren's syndrome.  She reports always having knee problems. 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 She reports she was doing walking on the treadmill, but now she cannot due to the knee pain.  She would walk 2.5 miles. It's mostly the right knee. It's been going on her whole life, but most disruptive for the last three months.     Patient reports symptoms of:  Pain and Weakness    Patient report of Pain:  Pain Rating Now: 7/10  Pain Rating at Best: 1/10  Pain Rating at Worst: 10/10  Pain Location: Right medial knee.  Left knee also hurts, but isn't as bad.   Pain Quality/Description: Sharp and Achy  Pain Better with: Just  resting it. Icing, Anti-Inflammatories.  Pain Worse with: Prolonged Walking, activity and on the feet  Progression of Symptoms: Getting Worse    Patient reports Red Flags symptoms of:  Recent Fall or Trauma    OBJECTIVE:  Seated Lower Extremity Manual Muscle Test / Strength Assessment:  Hip Flexion: Right Hip 5/5, Left Hip 5/5  Knee Extension: Right Knee 5/5, Left Knee 5/5  Knee Flexion: Right Knee 5/5, Left Knee 5/5  Ankle Dorsiflexion: Right Ankle 5/5, Left Ankle 5/5  Supine SLR Quad Endurance Test: Right Lower Extremity 30 Reps,  Left Lower Extremity 30 Reps    Supine Knee Heelslide Active ROM  Right Knee:   Knee Flexion: 124 degrees  Knee Extension -1 degrees  Left Knee:  Knee Flexion: 124 degrees  Knee Extension: -1 degrees    Knee Special Tests:  Genu Valgus Stress Test: Positive  Genu Varus Stress Test: Negative  Lachman's Test: Negative  Posterior Drawer Test: Negative  Sherri's Test: Positive       ASSESSMENT:  Siri is a 54 year old female referred to Physical Therapy for Primary Osteoarthrits of Both Knees   from [place].  Siri demonstrates findings of Pain, Loss of Function, and Gait Deficits that justify a need for formal Physical Therapy. These impairments interfere with their ability to perform self care tasks, work tasks, recreational activities, household chores, driving , household mobility, and community mobility as compared to their previous level of function.    Medical Diagnosis: Primary Osteoarthrits of Both Knees    Treatment Diagnosis: Bilateral Chronic Knee Pain     Clinical Decision Making (Complexity):  Clinical Presentation: Stable/Uncomplicated  Clinical Presentation Rationale: based on medical and personal factors listed in PT evaluation  Clinical Decision Making (Complexity): Low complexity      PHYSICAL THERAPY PLAN OF CARE:  Treatment Interventions:  Modalities: Cryotherapy, Dry Needling, E-stim, Hot Pack, Ultrasound  Interventions: Gait Training, Manual Therapy,  Neuromuscular Re-education, Therapeutic Activity, Therapeutic Exercise    Long Term Goals     PT Goal 1  Goal Identifier: Ambulation  Goal Description: Siri will be able to walk 2.5 miles without any knee pain  Rationale: to maximize safety and independence with performance of ADLs and functional tasks;to maximize safety and independence within the home;to maximize safety and independence within the community;to maximize safety and independence with transportation;to maximize safety and independence with self cares  Goal Progress: Has discontinued long walks on treadmill due to knee pain. Considering pursuing a bike instead  Target Date: 02/24/25    Frequency of Treatment: 1x a week  Duration of Treatment: 8 weeks         Risks and benefits of evaluation/treatment have been explained.   Patient/Family/caregiver agrees with Plan of Care.      Evaluation Time:     PT Eval, Low Complexity Minutes (01040): 15         Signing Clinician: Marco Crawford, PAXTON        SUMMARY OF PLAN OF CARE:  Siri presents with bilateral knee pain right worse than left primarily in the medial aspect with weight bearing prolonged tasks such as walking on the treadmill.  She has good strength and equal ROM bilaterally, however pain is reproduced with special tests.  Given her good baseline level of strength, she should respond well to a higher level strengthening program targeting the quadricpes and glute medius bilaterally.

## 2024-12-31 ENCOUNTER — OFFICE VISIT (OUTPATIENT)
Dept: FAMILY MEDICINE | Facility: CLINIC | Age: 54
End: 2024-12-31
Payer: COMMERCIAL

## 2024-12-31 VITALS
DIASTOLIC BLOOD PRESSURE: 63 MMHG | BODY MASS INDEX: 38.98 KG/M2 | OXYGEN SATURATION: 96 % | RESPIRATION RATE: 20 BRPM | HEIGHT: 63 IN | TEMPERATURE: 97.6 F | WEIGHT: 220 LBS | HEART RATE: 71 BPM | SYSTOLIC BLOOD PRESSURE: 114 MMHG

## 2024-12-31 DIAGNOSIS — R73.09 ELEVATED GLUCOSE: ICD-10-CM

## 2024-12-31 DIAGNOSIS — G43.001 MIGRAINE WITHOUT AURA AND WITH STATUS MIGRAINOSUS, NOT INTRACTABLE: Primary | ICD-10-CM

## 2024-12-31 LAB
EST. AVERAGE GLUCOSE BLD GHB EST-MCNC: 114 MG/DL
HBA1C MFR BLD: 5.6 % (ref 0–5.6)

## 2024-12-31 PROCEDURE — 83036 HEMOGLOBIN GLYCOSYLATED A1C: CPT | Performed by: FAMILY MEDICINE

## 2024-12-31 PROCEDURE — 99214 OFFICE O/P EST MOD 30 MIN: CPT | Performed by: FAMILY MEDICINE

## 2024-12-31 PROCEDURE — 36415 COLL VENOUS BLD VENIPUNCTURE: CPT | Performed by: FAMILY MEDICINE

## 2024-12-31 RX ORDER — SUMATRIPTAN SUCCINATE 100 MG/1
100 TABLET ORAL
COMMUNITY
Start: 2024-12-31

## 2024-12-31 RX ORDER — PROPRANOLOL HYDROCHLORIDE 60 MG/1
60 CAPSULE, EXTENDED RELEASE ORAL DAILY
COMMUNITY
Start: 2024-12-31

## 2024-12-31 ASSESSMENT — PAIN SCALES - GENERAL: PAINLEVEL_OUTOF10: SEVERE PAIN (7)

## 2024-12-31 NOTE — PATIENT INSTRUCTIONS
At [facility], we strive to deliver an exceptional experience to you, every time we see you. If you receive a survey, please let us know what we are doing well and/or what we could improve upon, as we do value your feedback.  If you have MyChart, you can expect to receive results automatically within 24 hours of their completion.  Your provider will send a note interpreting your results as well.   If you do not have MyChart, you should receive your results in about a week by mail.    Your care team:                            Family Medicine Internal Medicine   MD Nathaniel Piña, MD Kait Rao, MD Lenin Rangel, MD Donna Kwan, PAItzC    Chapito Coleman, MD Pediatrics   Denisa Sosa, MD Kera Martinez, MD Kay Mayen, APRN CNP Sandhya Peacock APRN CNP   MD Siria Moulton, MD Neyda Frost, CNP     Cory Park, CNP Same-Day Provider (No follow-up visits)   QUANG Aaron, DNP Cristina Go, QUANG Davis, FNP, BC RAJINDER BoydC     Clinic hours: Monday - Thursday 7 am-6 pm; Fridays 7 am-5 pm.   Urgent care: Monday - Friday 10 am- 8 pm; Saturday and Sunday 9 am-5 pm.    Clinic: [phone]       [facility]: Monday - Thursday 8 am - 7 pm; Friday 8 am - 6 pm  [facility]: [phone]

## 2024-12-31 NOTE — PROGRESS NOTES
"    Jayne Horner is a 54 year old, presenting for the following health issues:  Headache        12/31/2024     1:37 PM   Additional Questions   Roomed by Candace   Accompanied by Self     HPI         Where in your body do you have pain? Headache  Onset/Duration: over 1 month ago  Description  Location: bilateral in the frontal area, bilateral in the temporal area, bilateral in the occipital area   Character: throbbing pain, dull pain, sharp pain, squeezing pain  Frequency:  all day  Duration:  all day  Wake with headaches: YES  Able to do daily activities when headache present: no   Intensity:  0/10  Progression of Symptoms:  improving  Accompanying signs and symptoms:  Stiff neck: YES  Neck or upper back pain: YES  Sinus or URI symptoms no   Fever: No  Nausea or vomiting: No  Dizziness: No  Numbness/tingling: No  Weakness: No  Visual changes: Yes  History  Head trauma: No  Family history of migraines: No  Daily pain medication use: No  Previous tests for headaches: No  Neurologist evaluation: YES- 12/19/24  Precipitating or Alleviating factors (light/sound/sleep/caffeine):   Therapies tried and outcome: Inderal  & Imitrex            Outcome - effective  Frequent/daily pain medication use: No        Review of Systems  Constitutional, HEENT, cardiovascular, pulmonary, GI, , musculoskeletal, neuro, skin, endocrine and psych systems are negative, except as otherwise noted.      Objective    /63 (BP Location: Left arm, Patient Position: Chair, Cuff Size: Adult Large)   Pulse 71   Temp 97.6  F (36.4  C) (Temporal)   Resp 20   Ht 1.6 m (5' 3\")   Wt 99.8 kg (220 lb)   SpO2 96%   BMI 38.97 kg/m    Body mass index is 38.97 kg/m .  Physical Exam   GENERAL: alert and no distress  NECK: no adenopathy, no asymmetry, masses, or scars  RESP: lungs clear to auscultation - no rales, rhonchi or wheezes  CV: regular rate and rhythm, normal S1 S2, no S3 or S4, no murmur, click or rub, no peripheral edema  ABDOMEN: " soft, nontender, no hepatosplenomegaly, no masses and bowel sounds normal    A/P:  (G43.001) Migraine without aura and with status migrainosus, not intractable  (primary encounter diagnosis)  Comment:   Plan: SUMAtriptan (IMITREX) 100 MG tablet, riboflavin        100 MG CAPS, propranolol ER (INDERAL LA) 60 MG         24 hr capsule        Improved per patient. Patient seeing Neurology. Continue with propranolol for prophylaxis. Patient may use sumatriptan as needed. Patient will start riboflavin recommended per Neurology.    (R73.09) Elevated glucose  Comment:   Plan: Hemoglobin A1c        Monitor. Patient was doing well with weight loss but with knee issues, patient having hard time exercising. Will continue to monitor. Discussed glp-1's and metformin for the future if needed.            Signed Electronically by: Chapito Coleman MD, MD

## 2025-01-06 ENCOUNTER — THERAPY VISIT (OUTPATIENT)
Dept: PHYSICAL THERAPY | Facility: CLINIC | Age: 55
End: 2025-01-06
Attending: NURSE PRACTITIONER
Payer: COMMERCIAL

## 2025-01-06 DIAGNOSIS — M25.562 CHRONIC PAIN OF BOTH KNEES: Primary | ICD-10-CM

## 2025-01-06 DIAGNOSIS — M25.561 CHRONIC PAIN OF BOTH KNEES: Primary | ICD-10-CM

## 2025-01-06 DIAGNOSIS — G89.29 CHRONIC PAIN OF BOTH KNEES: Primary | ICD-10-CM

## 2025-01-06 PROCEDURE — 97110 THERAPEUTIC EXERCISES: CPT | Mod: GP

## 2025-01-09 NOTE — TELEPHONE ENCOUNTER
Action 1/09/2025   Action Taken 1) Called patient - patient has no prior surgery on knees      REASON FOR VISIT: cortisone shot, right knee > left knee   DATE OF APPT: 1/20/2025   NOTES (FOR ALL VISITS) STATUS DETAILS   OFFICE NOTE from referring provider N/A    OFFICE NOTE from other specialist Internal [facility]  Marco Crawford, PT  12/30/2024 - 1/06/2025   EMG N/A    MEDICATION LIST N/A    IMAGING  (FOR ALL VISITS)     XR Internal [facility]  XR Knee left 10/22/2024    [facility]  XR Knee bilateral 8/06/2024   MRI (HEAD, NECK, SPINE) N/A    CT (HEAD, NECK, SPINE) N/A

## 2025-01-15 ENCOUNTER — THERAPY VISIT (OUTPATIENT)
Dept: PHYSICAL THERAPY | Facility: CLINIC | Age: 55
End: 2025-01-15
Attending: NURSE PRACTITIONER
Payer: COMMERCIAL

## 2025-01-15 DIAGNOSIS — G89.29 CHRONIC PAIN OF BOTH KNEES: Primary | ICD-10-CM

## 2025-01-15 DIAGNOSIS — M25.562 CHRONIC PAIN OF BOTH KNEES: Primary | ICD-10-CM

## 2025-01-15 DIAGNOSIS — M25.561 CHRONIC PAIN OF BOTH KNEES: Primary | ICD-10-CM

## 2025-01-15 PROCEDURE — 97110 THERAPEUTIC EXERCISES: CPT | Mod: GP

## 2025-01-15 NOTE — PROGRESS NOTES
01/15/25 0500   Appointment Info   Signing clinician's name / credentials Marco Crawford, PT, DPT, CSCS, CLT   Total/Authorized Visits E&T   Visits Used 3   Medical Diagnosis Primary Osteoarthrits of Both Knees   PT Tx Diagnosis Bilateral Chronic Knee Pain   Progress Note/Certification   Onset of illness/injury or Date of Surgery 09/30/24   Therapy Frequency 1x a week   Predicted Duration 8 weeks   Progress Note Due Date 02/24/25   Progress Note Completed Date 12/30/24   PT Goal 1   Goal Identifier Ambulation   Goal Description Siri will be able to walk 2.5 miles without any knee pain   Rationale to maximize safety and independence with performance of ADLs and functional tasks;to maximize safety and independence within the home;to maximize safety and independence within the community;to maximize safety and independence with transportation;to maximize safety and independence with self cares   Goal Progress She is currently able to ambulate 1.5 miles on an incline on the treadmill   Target Date 02/24/25   Subjective Report   Subjective Report She reports that she has been on the treadmill walking despite it hurting. She also reports she still has the medial knee pain.  She feels   Objective Measures   Objective Measures Objective Measure 1;Objective Measure 2   Objective Measure 1   Objective Measure ROM   Details Right Knee: 0-118.  Left Knee: 0-121 (reports her knees feeling stiff at end range)   Objective Measure 2   Objective Measure Ambulation   Details She is currently able to ambulate 1.5 miles on an incline on the treadmill   Treatment Interventions (PT)   Interventions Therapeutic Procedure/Exercise   Therapeutic Procedure/Exercise   Therapeutic Procedures: strength, endurance, ROM, flexibility minutes (38279) 38   Ther Proc 1 Bike   Ther Proc 1 - Details 5 minutes with level 5 resistance   PTRx Ther Proc 1 Armenian Squats   PTRx Ther Proc 1 - Details 3x15 with Black TB   PTRx Ther Proc 2 Side Stepping With  Theraband   PTRx Ther Proc 2 - Details 3x20 feet each direction with blue band   PTRx Ther Proc 3 Star Exercise   PTRx Ther Proc 3 - Details 1x10 each direction   PTRx Ther Proc 4 Squats with Theraband   PTRx Ther Proc 4 - Details Squats with Theraband on Leg Press with Blue TB around knees 3x15 with 40 lbs added   PTRx Ther Proc 5 Standing Fire Hydrant   PTRx Ther Proc 5 - Details 1x25 seconds hold then 1x70 seconds hold with Blue TB around knees   PTRx Ther Proc 6 Short Arc Knee Extension   PTRx Ther Proc 6 - Details 1x10 for 3-5 second holds and 5 lb ankle weights each side. Felt slight knocking on her right knee when performing.   Skilled Intervention Continued HEP   Patient Response/Progress Tolerated well   Plan   Home program See PTRx   Updates to plan of care Continue per POC   Plan for next session Minimal change in symptoms overall, but no pain with this current HEP. Her current HEP is well tolerated with a lot of strengthening and fatigue occuring with her HEP.  She will follow up with Dr. Amezcua and then continue therapy on a biweekly basis while she manages her HEP at home   Total Session Time   Timed Code Treatment Minutes 38   Total Treatment Time (sum of timed and untimed services) 38           PLAN  Continue therapy per current plan of care. Doing well and has a good HEP, but so far has had minimal change in symptoms.    Beginning/End Dates of Progress Note Reporting Period:  12/30/24 to 01/15/2025    Referring Provider:  [place]

## 2025-01-20 ENCOUNTER — ANCILLARY PROCEDURE (OUTPATIENT)
Dept: GENERAL RADIOLOGY | Facility: CLINIC | Age: 55
End: 2025-01-20
Attending: FAMILY MEDICINE
Payer: COMMERCIAL

## 2025-01-20 ENCOUNTER — PRE VISIT (OUTPATIENT)
Dept: ORTHOPEDICS | Facility: CLINIC | Age: 55
End: 2025-01-20

## 2025-01-20 ENCOUNTER — OFFICE VISIT (OUTPATIENT)
Dept: ORTHOPEDICS | Facility: CLINIC | Age: 55
End: 2025-01-20
Payer: COMMERCIAL

## 2025-01-20 DIAGNOSIS — M17.0 BILATERAL PRIMARY OSTEOARTHRITIS OF KNEE: Primary | ICD-10-CM

## 2025-01-20 DIAGNOSIS — M17.0 BILATERAL PRIMARY OSTEOARTHRITIS OF KNEE: ICD-10-CM

## 2025-01-20 PROCEDURE — 99203 OFFICE O/P NEW LOW 30 MIN: CPT | Mod: 25 | Performed by: FAMILY MEDICINE

## 2025-01-20 PROCEDURE — 73564 X-RAY EXAM KNEE 4 OR MORE: CPT | Mod: LT | Performed by: RADIOLOGY

## 2025-01-20 PROCEDURE — 20610 DRAIN/INJ JOINT/BURSA W/O US: CPT | Mod: 50 | Performed by: FAMILY MEDICINE

## 2025-01-20 RX ORDER — TRIAMCINOLONE ACETONIDE 40 MG/ML
40 INJECTION, SUSPENSION INTRA-ARTICULAR; INTRAMUSCULAR
Status: COMPLETED | OUTPATIENT
Start: 2025-01-20 | End: 2025-01-20

## 2025-01-20 RX ADMIN — TRIAMCINOLONE ACETONIDE 40 MG: 40 INJECTION, SUSPENSION INTRA-ARTICULAR; INTRAMUSCULAR at 11:01

## 2025-01-20 ASSESSMENT — PAIN SCALES - GENERAL: PAINLEVEL_OUTOF10: SEVERE PAIN (8)

## 2025-01-20 NOTE — NURSING NOTE
Chief Complaint   Patient presents with    Left Knee - Pain, New Patient     Knee pain for a few months    Right Knee - Pain, New Patient     Knee pain for a few months         There were no vitals filed for this visit.    There is no height or weight on file to calculate BMI.      KAUR Jacobs NREMT

## 2025-01-20 NOTE — NURSING NOTE
[facility]   ORTHOPEDICS & SPORTS MEDICINE  [address] [place] [address]  Dept: [phone]  ______________________________________________________________________________    Patient: Siri Vyas   : 1970   MRN: [id number]   2025    INVASIVE PROCEDURE SAFETY CHECKLIST    Date: 2025   Procedure: Bilateral knee injection  Patient Name: Siri Vyas  MRN: [id number]  YOB: 1970    Action: Complete sections as appropriate. Any discrepancy results in a HARD COPY until resolved.     PRE PROCEDURE:  Patient ID verified with 2 identifiers (name and  or MRN): Yes  Procedure and site verified with patient/designee (when able): Yes  Accurate consent documentation in medical record: Yes  H&P (or appropriate assessment) documented in medical record: Yes  H&P must be up to 20 days prior to procedure and updates within 24 hours of procedure as applicable: NA  Relevant diagnostic and radiology test results appropriately labeled and displayed as applicable: NA  Procedure site(s) marked with provider initials: NA    TIMEOUT:  Time-Out performed immediately prior to starting procedure, including verbal and active participation of all team members addressing the following:Yes  * Correct patient identify  * Confirmed that the correct side and site are marked  * An accurate procedure consent form  * Agreement on the procedure to be done  * Correct patient position  * Relevant images and results are properly labeled and appropriately displayed  * The need to administer antibiotics or fluids for irrigation purposes during the procedure as applicable   * Safety precautions based on patient history or medication use    DURING PROCEDURE: Verification of correct person, site, and procedures any time the responsibility for care of the patient is transferred to another member of the care team.       Prior to injection, verified patient identity using patient's name and date of  birth.  Due to injection administration, patient instructed to remain in clinic for 15 minutes  afterwards, and to report any adverse reaction to me immediately.    Joint injection was performed.      Drug Amount Wasted:  None.  Vial/Syringe: Single dose vial  Expiration Date:  7/31/2026      Reynaldo Hernandez, EMT  January 20, 2025

## 2025-01-20 NOTE — PROGRESS NOTES
HISTORY OF PRESENT ILLNESS  Ms. Vyas is a 54 year old female who presents to clinic today with bilateral knee pain.  Siri has bilateral knee pain that is longstanding, right is much worse than her left.  She has had corticosteroid injections for her knees in the past, most recently a number of years ago.  These did help greatly at the time.        Additional history: as documented    MEDICAL HISTORY  Patient Active Problem List   Diagnosis    Seasonal allergic rhinitis    CARDIOVASCULAR SCREENING; LDL GOAL LESS THAN 160    S/P cholecystectomy    Dermoid cyst of ovary    H/O:  section    LUQ abdominal pain    Paresthesias/numbness    Median neuropathy    Epicondylitis, medial humeral    Tendinitis of left wrist    Headache    Chondromalacia, knee, left    Chondromalacia patellae, right    CTS (carpal tunnel syndrome)    Family history of malignant neoplasm of breast    Class 2 obesity due to excess calories without serious comorbidity with body mass index (BMI) of 39.0 to 39.9 in adult    Saphenous vein clot, left    Thrombophlebitis leg    Pain and swelling of left knee    Acute left-sided low back pain without sciatica    Grief reaction    Primary osteoarthritis of both knees    Chronic pain of both knees    Caregiver role strain    Other spondylosis, lumbar region    RLQ abdominal pain    Class 2 severe obesity due to excess calories with serious comorbidity in adult (H)    Ulnar neuropathy at elbow, left       Current Outpatient Medications   Medication Sig Dispense Refill    acetaminophen (TYLENOL) 325 MG tablet Take 325-650 mg by mouth every 6 hours as needed      aspirin (ASA) 81 MG EC tablet Take 1 tablet (81 mg) by mouth daily 90 tablet 3    benzonatate (TESSALON) 200 MG capsule Take 1 capsule (200 mg) by mouth 3 times daily as needed for cough. 30 capsule 0    Cholecalciferol (VITAMIN D3) 25 MCG (1000 UT) CAPS Take 2,000 Units by mouth.      fexofenadine (ALLEGRA) 180 MG tablet Take  by mouth.  [facility] Dermatology Record:  Store and forward and Telephone [phone]    Dermatology Problem List:  1. Acne vulgaris, on Accutane  - s/p isotretioin 12,900mg (236.7mg/kg) discontinued 8/26/20    Encounter Date: Aug 27, 2020    CC:   Chief Complaint   Patient presents with     Accutane     Laurie is following up on accutane.      History of Present Illness:  Laurie Morales is a 21 year old female who presents for acne/isotretinoin follow-up. Reports she is doing great, no breakouts in the past few moths. Skin is really clear. No new side effects to report. She has about 3 days of medication left to take. She has no other concerns today. The patient reports tolerable mucocutaneous dryness, and denies arthralgias, myalgias, depression, suicidal ideation, diarrhea, headache, or blurred vision.      ROS: Patient is generally feeling well today   -Neuro: no HA or vision changes  -GI: No nausea, blood in stool, diarrhea, hx of IBD  -Psych: no depression/anxiety, mood changes, or sleep problems   -Musculoskeletal: no joint or muscle pain or swelling   -Heme/Lymph: no concerning bumps, no bleeding problems  -Constitutional: no unintended weight loss/gain, no night sweats or fevers  -Skin as per HPI    Physical Examination:  General: Well-appearing, appropriately-developed individual.  Skin: Focused examination including face was performed.   -skin is clear, no active papules, pustules or comedones  -few hyperpigmented macules on the lower face, but improving    Labs:  Hcg negative, will repeat 30 days after last dose    Past Medical History:   Patient Active Problem List   Diagnosis     Mild intermittent asthma     Impetigo     Goiter     Acne vulgaris     Encounter for long-term current use of high risk medication     Past Medical History:   Diagnosis Date     Nonsuppurative otitis media, not specified as acute or chronic 09/17/1999    Recurrent otitis media     Unspecified disease of respiratory system 09/17/1999     1 TABLET DAILY FOR ALLERGIES 90 tablet 0    GLUCOSAMINE-CHONDROITIN PO       hydroxychloroquine (PLAQUENIL) 200 MG tablet Take 1 tablet (200 mg) by mouth 2 times daily. Annual Plaquenil toxicity eye screening required. 180 tablet 3    magnesium oxide 400 MG CAPS Take by mouth daily      Misc Natural Products (OSTEO BI-FLEX JOINT SHIELD PO) Take by mouth daily.      Multiple Vitamin (MULTIVITAMINS PO)       Omega-3 Fatty Acids (OMEGA-3 FISH OIL PO)       propranolol ER (INDERAL LA) 60 MG 24 hr capsule Take 1 capsule (60 mg) by mouth daily.      riboflavin 100 MG CAPS Take 200 mg by mouth daily.      SUMAtriptan (IMITREX) 100 MG tablet Take 1 tablet (100 mg) by mouth at onset of headache for migraine.         Allergies   Allergen Reactions    Dust Mites     No Clinical Screening - See Comments Other (See Comments)    No Known Drug Allergy     Ragweeds Other (See Comments)    Seasonal Allergies        Family History   Problem Relation Age of Onset    Breast Cancer Mother     Lipids Mother     Hypertension Mother     Hearing Loss Mother     Lung Cancer Mother         2017    Hyperlipidemia Mother     Other Cancer Mother         lung    Esophageal Cancer Mother     LUNG DISEASE Mother     Lipids Brother     Vascular Disease Father     Cerebrovascular Disease Maternal Grandmother 76    Neurologic Disorder Son 14        migraines    Breast Cancer Maternal Aunt 70    Breast Cancer Maternal Aunt 80    Hypertension Brother     Hyperlipidemia Brother     Colon Cancer Cousin     Other Cancer Cousin     Other Cancer Other         breast       Additional medical/Social/Surgical histories reviewed in [facility] and updated as appropriate.        PHYSICAL EXAM  General  - normal appearance, in no obvious distress  Musculoskeletal -right and left knee  - stance: mildly antalgic gait  - inspection: trace effusion in right  - palpation: medial joint line tenderness, right worse than left  - ROM: 120 degrees flexion, 0 degrees extension  -  RAD     Past Surgical History:   Procedure Laterality Date     HC CREATE EARDRUM OPENING,GEN ANESTH      P.E. [id number]     Social History:  Patient reports that she has never smoked. She has never used smokeless tobacco. She reports that she does not drink alcohol or use drugs.    Family History:  Family History   Problem Relation Age of Onset     Cancer Paternal Grandfather         prostate     Heart Disease Maternal Grandfather         CAB age 63     Cerebrovascular Disease Maternal Grandfather      Anesthesia Reaction No family hx of      Medications:  Current Outpatient Medications   Medication     ISOtretinoin (ABSORICA) 30 MG capsule     TRI-LO-ESTARYLLA 0.18/0.215/0.25 MG-25 MCG per tablet     VENTOLIN  (90 BASE) MCG/ACT Inhaler     clindamycin (CLEOCIN T) 1 % solution     ISOtretinoin (ABSORICA) 30 MG capsule     ISOtretinoin (ACCUTANE) 40 MG capsule     terconazole (TERAZOL 7) 0.4 % cream     No current facility-administered medications for this visit.      Allergies   Allergen Reactions     No Known Allergies      Impression and Recommendations (Patient Counseled on the Following):  1. Acne vulgaris, predominantly inflammatory.  Patient started isotretinoin 6 months ago, tolerating well without significant side effects.  Ongoing improvement.    Disontinue isotretinoin. Goal dose is 220mg/kg. Weighs 120 lb (54.5kg); total dose to date is 12,900mg (goal dose 12,000mg)     Method of contraception includes: OCP and condoms.    UPT yesterday was negative; will repeat 30 days after final dose    Discussion of the risks and side effects of isotretinoin including but not limited to mucocutaneous dryness, arthralgias, myalgias, depression,  headache, blurred vision, increase in liver function test and increase in lipids.     Re-emphasized the absolute contraindication to pregnancy while taking isotretinoin    Discussed need for sun protection, at least SPF 30+.    Repeat labs 3/4/20  - LFT, fasting  lipids - all WNL    Total dose to date: 12,900mg/kg (236.7mg/kg)    Start tretinoin 0.05% cream QHS    Follow-up:   Follow-up with dermatology in approximately 3mo. Earlier for new or changing lesions or rash.   CC Dr. Alicia at close of this encounter     Staff only    All risks, benefits and alternatives were discussed with patient.  Patient is in agreement and understands the assessment and plan.  All questions were answered.    Slime Hernandez PA-C, MPAS  [facility] [place]: Phone: [phone], Fax: [phone]  [facility]: Phone: [phone],  Fax: [phone]  _____________________________________________________________________________    Teledermatology information:  - Location of patient: [place]  - Patient presented as: return  - Location of teledermatologist:  ([facility] )  - Reason teledermatology is appropriate:  of National Emergency Regarding Coronavirus disease (COVID 19) Outbreak  - Image quality and interpretability: acceptable  - Physician has received verbal consent for a Video/Photos Visit from the patient? Yes  - In-person dermatology visit recommendation: no  - Date of images: 8/27/20  - Length of call: 8min  - Date of report: 8/27/2020    strength: 5/5 in flexion, 5/5 in extension  - special tests:  (-) Sherri  (-) varus at 0 and 30 degrees flexion  (-) valgus at 0 and 30 degrees flexion  Neuro  - no sensory or motor deficit, grossly normal coordination, normal muscle tone              ASSESSMENT & PLAN  Ms. Vyas is a 54 year old female who presents to clinic today with bilateral knee pain.    I ordered and independently reviewed an xray of her knees, this reveals moderate to severe tricompartmental osteoarthritis, this is worse in the medial knee compartments bilaterally, worse in her right knee than her left.    Siri and I had a discussion centering around the spectrum of treatment options for osteoarthritis that preclude surgery.  We discussed nonoperative treatment with pain relievers, icing, low impact exercise, and weight control.  We also talked about the role of injection based therapies.    Through shared decision making we did decide to inject both of her knees with corticosteroid today.  This is something that we could consider repeating in the future on an as helpful basis, no sooner than 3 months from now.  If she does well we can follow-up as needed for this and other issues.    It was a pleasure seeing Siri today.    Greater than 20 minutes were spent on the day of visit reviewing records, in direct face-to-face consultation and exam, and documentation independent of the procedure.       Hernesto Amezcua DO, [facility]  Primary Care Sports Medicine      This note was constructed using Dragon dictation software, please excuse any minor errors in spelling, grammar, or syntax.      Large Joint Injection/Arthocentesis: bilateral knee    Date/Time: 1/20/2025 11:01 AM    Performed by: Hernesto Amezcua DO  Authorized by: Hernesto Amezcua DO    Indications:  Pain  Needle Size:  22 G  Guidance: landmark guided    Approach:  Lateral  Location:  Knee  Laterality:  Bilateral      Medications (Right):  40 mg triamcinolone 40 MG/ML  Medications  (Left):  40 mg triamcinolone 40 MG/ML  Outcome:  Tolerated well, no immediate complications  Procedure discussed: discussed risks, benefits, and alternatives    Consent Given by:  Patient  Timeout: timeout called immediately prior to procedure    Prep: patient was prepped and draped in usual sterile fashion         PROCEDURE    Knee Injections - Intraarticular    The patient was informed of the risks and the benefits of the procedure and a written consent was signed.    The patient's left knee was prepped with chlorhexidine in sterile fashion.   40 mg of triamcinolone suspension was drawn up into a 5 mL syringe with 4 mL of 1% lidocaine.  Injection was performed using substerile technique.  A 1.5-inch 22-gauge needle was used to enter the lateral aspect of the left knee.  Injection performed successfully without difficulty.  There were no complications. The patient tolerated the procedure well. There was negligible bleeding.     The patient's right knee was prepped with chlorhexidine in sterile fashion.   40 mg of triamcinolone suspension was drawn up into a 5 mL syringe with 4 mL of 1% lidocaine.  Injection was performed using substerile technique.  A 1.5-inch 22-gauge needle was used to enter the lateral aspect of the right knee.  Injection performed successfully without difficulty.  There were no complications. The patient tolerated the procedure well. There was negligible bleeding.

## 2025-01-20 NOTE — LETTER
2025      Siri Vyas  [address]  [facility] [address]      Dear Colleague,    Thank you for referring your patient, Siri Vyas, to the [facility] [place]. Please see a copy of my visit note below.      HISTORY OF PRESENT ILLNESS  Ms. Vyas is a 54 year old female who presents to clinic today with bilateral knee pain.  Siri has bilateral knee pain that is longstanding, right is much worse than her left.  She has had corticosteroid injections for her knees in the past, most recently a number of years ago.  These did help greatly at the time.        Additional history: as documented    MEDICAL HISTORY  Patient Active Problem List   Diagnosis     Seasonal allergic rhinitis     CARDIOVASCULAR SCREENING; LDL GOAL LESS THAN 160     S/P cholecystectomy     Dermoid cyst of ovary     H/O:  section     LUQ abdominal pain     Paresthesias/numbness     Median neuropathy     Epicondylitis, medial humeral     Tendinitis of left wrist     Headache     Chondromalacia, knee, left     Chondromalacia patellae, right     CTS (carpal tunnel syndrome)     Family history of malignant neoplasm of breast     Class 2 obesity due to excess calories without serious comorbidity with body mass index (BMI) of 39.0 to 39.9 in adult     Saphenous vein clot, left     Thrombophlebitis leg     Pain and swelling of left knee     Acute left-sided low back pain without sciatica     Grief reaction     Primary osteoarthritis of both knees     Chronic pain of both knees     Caregiver role strain     Other spondylosis, lumbar region     RLQ abdominal pain     Class 2 severe obesity due to excess calories with serious comorbidity in adult (H)     Ulnar neuropathy at elbow, left       Current Outpatient Medications   Medication Sig Dispense Refill     acetaminophen (TYLENOL) 325 MG tablet Take 325-650 mg by mouth every 6 hours as needed       aspirin (ASA) 81 MG EC tablet Take 1 tablet (81 mg) by mouth  daily 90 tablet 3     benzonatate (TESSALON) 200 MG capsule Take 1 capsule (200 mg) by mouth 3 times daily as needed for cough. 30 capsule 0     Cholecalciferol (VITAMIN D3) 25 MCG (1000 UT) CAPS Take 2,000 Units by mouth.       fexofenadine (ALLEGRA) 180 MG tablet Take  by mouth. 1 TABLET DAILY FOR ALLERGIES 90 tablet 0     GLUCOSAMINE-CHONDROITIN PO        hydroxychloroquine (PLAQUENIL) 200 MG tablet Take 1 tablet (200 mg) by mouth 2 times daily. Annual Plaquenil toxicity eye screening required. 180 tablet 3     magnesium oxide 400 MG CAPS Take by mouth daily       Misc Natural Products (OSTEO BI-FLEX JOINT SHIELD PO) Take by mouth daily.       Multiple Vitamin (MULTIVITAMINS PO)        Omega-3 Fatty Acids (OMEGA-3 FISH OIL PO)        propranolol ER (INDERAL LA) 60 MG 24 hr capsule Take 1 capsule (60 mg) by mouth daily.       riboflavin 100 MG CAPS Take 200 mg by mouth daily.       SUMAtriptan (IMITREX) 100 MG tablet Take 1 tablet (100 mg) by mouth at onset of headache for migraine.         Allergies   Allergen Reactions     Dust Mites      No Clinical Screening - See Comments Other (See Comments)     No Known Drug Allergy      Ragweeds Other (See Comments)     Seasonal Allergies        Family History   Problem Relation Age of Onset     Breast Cancer Mother      Lipids Mother      Hypertension Mother      Hearing Loss Mother      Lung Cancer Mother         2017     Hyperlipidemia Mother      Other Cancer Mother         lung     Esophageal Cancer Mother      LUNG DISEASE Mother      Lipids Brother      Vascular Disease Father      Cerebrovascular Disease Maternal Grandmother 76     Neurologic Disorder Son 14        migraines     Breast Cancer Maternal Aunt 70     Breast Cancer Maternal Aunt 80     Hypertension Brother      Hyperlipidemia Brother      Colon Cancer Cousin      Other Cancer Cousin      Other Cancer Other         breast       Additional medical/Social/Surgical histories reviewed in [facility] and updated as  appropriate.        PHYSICAL EXAM  General  - normal appearance, in no obvious distress  Musculoskeletal -right and left knee  - stance: mildly antalgic gait  - inspection: trace effusion in right  - palpation: medial joint line tenderness, right worse than left  - ROM: 120 degrees flexion, 0 degrees extension  - strength: 5/5 in flexion, 5/5 in extension  - special tests:  (-) Sherri  (-) varus at 0 and 30 degrees flexion  (-) valgus at 0 and 30 degrees flexion  Neuro  - no sensory or motor deficit, grossly normal coordination, normal muscle tone              ASSESSMENT & PLAN  Ms. Vyas is a 54 year old female who presents to clinic today with bilateral knee pain.    I ordered and independently reviewed an xray of her knees, this reveals moderate to severe tricompartmental osteoarthritis, this is worse in the medial knee compartments bilaterally, worse in her right knee than her left.    Siri and I had a discussion centering around the spectrum of treatment options for osteoarthritis that preclude surgery.  We discussed nonoperative treatment with pain relievers, icing, low impact exercise, and weight control.  We also talked about the role of injection based therapies.    Through shared decision making we did decide to inject both of her knees with corticosteroid today.  This is something that we could consider repeating in the future on an as helpful basis, no sooner than 3 months from now.  If she does well we can follow-up as needed for this and other issues.    It was a pleasure seeing Siri today.    Greater than 20 minutes were spent on the day of visit reviewing records, in direct face-to-face consultation and exam, and documentation independent of the procedure.       Hernesto Amezcua DO, [facility]  Primary Care Sports Medicine      This note was constructed using Dragon dictation software, please excuse any minor errors in spelling, grammar, or syntax.      Large Joint Injection/Arthocentesis: bilateral  knee    Date/Time: 1/20/2025 11:01 AM    Performed by: Hernesto Amezcua DO  Authorized by: Hernesto Amezcua DO    Indications:  Pain  Needle Size:  22 G  Guidance: landmark guided    Approach:  Lateral  Location:  Knee  Laterality:  Bilateral      Medications (Right):  40 mg triamcinolone 40 MG/ML  Medications (Left):  40 mg triamcinolone 40 MG/ML  Outcome:  Tolerated well, no immediate complications  Procedure discussed: discussed risks, benefits, and alternatives    Consent Given by:  Patient  Timeout: timeout called immediately prior to procedure    Prep: patient was prepped and draped in usual sterile fashion         PROCEDURE    Knee Injections - Intraarticular    The patient was informed of the risks and the benefits of the procedure and a written consent was signed.    The patient's left knee was prepped with chlorhexidine in sterile fashion.   40 mg of triamcinolone suspension was drawn up into a 5 mL syringe with 4 mL of 1% lidocaine.  Injection was performed using substerile technique.  A 1.5-inch 22-gauge needle was used to enter the lateral aspect of the left knee.  Injection performed successfully without difficulty.  There were no complications. The patient tolerated the procedure well. There was negligible bleeding.     The patient's right knee was prepped with chlorhexidine in sterile fashion.   40 mg of triamcinolone suspension was drawn up into a 5 mL syringe with 4 mL of 1% lidocaine.  Injection was performed using substerile technique.  A 1.5-inch 22-gauge needle was used to enter the lateral aspect of the right knee.  Injection performed successfully without difficulty.  There were no complications. The patient tolerated the procedure well. There was negligible bleeding.                 Again, thank you for allowing me to participate in the care of your patient.        Sincerely,    Hernesto Amezcua DO    Electronically signed

## 2025-01-27 ENCOUNTER — TRANSFERRED RECORDS (OUTPATIENT)
Dept: HEALTH INFORMATION MANAGEMENT | Facility: CLINIC | Age: 55
End: 2025-01-27
Payer: COMMERCIAL

## 2025-01-30 ENCOUNTER — DOCUMENTATION ONLY (OUTPATIENT)
Dept: RHEUMATOLOGY | Facility: CLINIC | Age: 55
End: 2025-01-30

## 2025-01-30 NOTE — PROGRESS NOTES
Plaquenil eye exam received, updated in flow sheets and office notes sent to be scanned into chart.    Date of last eye exam: 1/27/25   Ophthalmologist:  Dr. Aleshia Torres MD   Eye exam location: [facility] eye    OCT last date completed: 1/27/25   VF (most recent):  1/27/25   Toxicity: No   Follow-up needed: 1 year follow-up     PASQUALE Bassett  Rheumatology/Infectious disease  [facility]   Rheumatology ph:[phone]  Infectious Disease ph:[phone]

## 2025-03-05 ENCOUNTER — OFFICE VISIT (OUTPATIENT)
Dept: NEUROLOGY | Facility: CLINIC | Age: 55
End: 2025-03-05
Attending: STUDENT IN AN ORGANIZED HEALTH CARE EDUCATION/TRAINING PROGRAM
Payer: COMMERCIAL

## 2025-03-05 DIAGNOSIS — G56.22 ULNAR NEUROPATHY AT ELBOW, LEFT: ICD-10-CM

## 2025-03-05 NOTE — PROGRESS NOTES
[facility]  Electrodiagnostic Laboratory                 Department of Neurology                                                                                                         Test Date:  3/5/2025    Patient: Siri Vyas : 1970 Physician: Reynaldo Beasley MD   Sex: Female AGE: 54 year Ref Phys: Nilesh Hendrix MD   ID#: [id number]   Technician: Mack Turcios     History and Examination:  54 year old with numbness and paresthesias in the left forearm and hand. Onset about 6 months ago. She has a history of cubital tunnel and carpal tunnel surgery on the milagros several years ago. This study is being performed to investigate for radiculopathy vs focal neuropathy.      Techniques:  Motor and sensory conduction studies were done with surface recording electrodes.  EMG was done with a concentric needle electrode.     Results  Nerve conduction studies:  1. Left median-D2 and ulnar-D5 sensory responses are normal.   2. Left median-APB, ulnar-FDI and ulnar-ADM motor responses are normal.     Needle EMG of selected proximal and distal left upper limb muscles was performed as tabulated below. No abnormal spontaneous activity was observed in the sampled muscles. Motor unit potential morphology and recruitment patterns were normal.     Interpretation:  This is a normal study. There is no electrophysiologic evidence of a focal neuropathy or cervical radiculopathy affecting the left upper limb on the basis of this study.     Reynaldo Beasley MD  Department of Neurology        Nerve Conduction Studies  Motor Sites      Latency Neg. Amp Neg. Amp Diff Segment Distance Velocity Neg. Dur Neg Area Diff Temperature Comment   Site (ms) Norm (mV) Norm (%)  cm m/s Norm (ms) (%) ( C)    Left Median (APB) Motor   Wrist 2.8  < 4.4 9.8  > 5.0  Wrist-APB 8   -  32.4    Elbow 6.1 - 9.3  > 5.0 -5 Elbow-Wrist 18.5 56  > 48 - - 32.5    Left Ulnar (ADM) Motor   Wrist 2.5  < 3.5 9.0  > 5.0  Wrist-ADM 8    5.2  33.6    Below Elbow 5.2 - 8.6 - -4 Below Elbow-Wrist 17.5 65  > 48 5.1 -6 33.6    Above Elbow 6.6 - 8.3 - -3 Above Elbow-Below Elbow 9 64  > 48 5.5 0 33.7    Left Ulnar (FDI) Motor   Wrist 3.0 - 9.4 -      4.0  33.5    Below Elbow 6.0 - 9.2 - -2 Below Elbow-Wrist 17.5 58  > 48 3.9 -5 33.5    Above Elbow 7.6 - 8.9 - -3 Above Elbow-Below Elbow 9 56  > 48 4.0 -2 33.5      F-Wave Sites      Min F-Lat Max-Min F-Lat Mean F-Lat   Site (ms) (ms) (ms)   Left Ulnar F-Wave   Wrist 24.2 1.90 25.1     Sensory Sites      Onset Lat Peak Lat Amp (O-P) Amp (P-P) Segment Distance Velocity Temperature Comment   Site ms (ms)  V Norm ( V)  cm m/s Norm ( C)    Left Median Sensory   Wrist-Dig II 2.2 2.9 29  > 10 38 Wrist-Dig II 14 64  > 48 33.5    Left Ulnar Sensory   Wrist-Dig V 2.1 2.7 16  > 8 30 Wrist-Dig V 12.5 60  > 48 33.8        Electromyography     Side Muscle Ins Act Fibs/PSW Fasc HF Amp Dur Poly Recrt Int Pat   Left Deltoid Nml None Nml 0 Nml Nml 0 Nml Nml   Left Biceps Nml None Nml 0 Nml Nml 0 Nml Nml   Left Triceps Nml None Nml 0 Nml Nml 0 Nml Nml   Left Pronator Teres Nml None Nml 0 Nml Nml 0 Nml Nml   Left FDI Nml None Nml 0 Nml Nml 0 Nml Nml         Waveforms / Images:    Motor           Sensory         F-Wave

## 2025-03-05 NOTE — LETTER
3/5/2025       RE: Siri Vyas  [address]  [facility] [address]     Dear Colleague,    Thank you for referring your patient, Siri Vyas, to the [facility] EMG [facility] [place] at [facility]. Please see a copy of my visit note below.                        [facility]  Electrodiagnostic Laboratory                 Department of Neurology                                                                                                         Test Date:  3/5/2025    Patient: Siri Vyas : 1970 Physician: Reynaldo Beasley MD   Sex: Female AGE: 54 year Ref Phys: Nilesh Hendrix MD   ID#: [id number]   Technician: Mack Turcios     History and Examination:  54 year old with numbness and paresthesias in the left forearm and hand. Onset about 6 months ago. She has a history of cubital tunnel and carpal tunnel surgery on the milagros several years ago. This study is being performed to investigate for radiculopathy vs focal neuropathy.      Techniques:  Motor and sensory conduction studies were done with surface recording electrodes.  EMG was done with a concentric needle electrode.     Results  Nerve conduction studies:  1. Left median-D2 and ulnar-D5 sensory responses are normal.   2. Left median-APB, ulnar-FDI and ulnar-ADM motor responses are normal.     Needle EMG of selected proximal and distal left upper limb muscles was performed as tabulated below. No abnormal spontaneous activity was observed in the sampled muscles. Motor unit potential morphology and recruitment patterns were normal.     Interpretation:  This is a normal study. There is no electrophysiologic evidence of a focal neuropathy or cervical radiculopathy affecting the left upper limb on the basis of this study.     Reynaldo Beasley MD  Department of Neurology        Nerve Conduction Studies  Motor Sites      Latency Neg. Amp Neg. Amp Diff Segment Distance Velocity Neg. Dur Neg  Area Diff Temperature Comment   Site (ms) Norm (mV) Norm (%)  cm m/s Norm (ms) (%) ( C)    Left Median (APB) Motor   Wrist 2.8  < 4.4 9.8  > 5.0  Wrist-APB 8   -  32.4    Elbow 6.1 - 9.3  > 5.0 -5 Elbow-Wrist 18.5 56  > 48 - - 32.5    Left Ulnar (ADM) Motor   Wrist 2.5  < 3.5 9.0  > 5.0  Wrist-ADM 8   5.2  33.6    Below Elbow 5.2 - 8.6 - -4 Below Elbow-Wrist 17.5 65  > 48 5.1 -6 33.6    Above Elbow 6.6 - 8.3 - -3 Above Elbow-Below Elbow 9 64  > 48 5.5 0 33.7    Left Ulnar (FDI) Motor   Wrist 3.0 - 9.4 -      4.0  33.5    Below Elbow 6.0 - 9.2 - -2 Below Elbow-Wrist 17.5 58  > 48 3.9 -5 33.5    Above Elbow 7.6 - 8.9 - -3 Above Elbow-Below Elbow 9 56  > 48 4.0 -2 33.5      F-Wave Sites      Min F-Lat Max-Min F-Lat Mean F-Lat   Site (ms) (ms) (ms)   Left Ulnar F-Wave   Wrist 24.2 1.90 25.1     Sensory Sites      Onset Lat Peak Lat Amp (O-P) Amp (P-P) Segment Distance Velocity Temperature Comment   Site ms (ms)  V Norm ( V)  cm m/s Norm ( C)    Left Median Sensory   Wrist-Dig II 2.2 2.9 29  > 10 38 Wrist-Dig II 14 64  > 48 33.5    Left Ulnar Sensory   Wrist-Dig V 2.1 2.7 16  > 8 30 Wrist-Dig V 12.5 60  > 48 33.8        Electromyography     Side Muscle Ins Act Fibs/PSW Fasc HF Amp Dur Poly Recrt Int Pat   Left Deltoid Nml None Nml 0 Nml Nml 0 Nml Nml   Left Biceps Nml None Nml 0 Nml Nml 0 Nml Nml   Left Triceps Nml None Nml 0 Nml Nml 0 Nml Nml   Left Pronator Teres Nml None Nml 0 Nml Nml 0 Nml Nml   Left FDI Nml None Nml 0 Nml Nml 0 Nml Nml         Waveforms / Images:    Motor           Sensory         F-Wave                 Again, thank you for allowing me to participate in the care of your patient.      Sincerely,    Reynaldo Beasley MD

## 2025-03-13 ENCOUNTER — PATIENT OUTREACH (OUTPATIENT)
Dept: CARE COORDINATION | Facility: CLINIC | Age: 55
End: 2025-03-13
Payer: COMMERCIAL

## 2025-03-19 ENCOUNTER — TELEPHONE (OUTPATIENT)
Dept: RHEUMATOLOGY | Facility: CLINIC | Age: 55
End: 2025-03-19
Payer: COMMERCIAL

## 2025-03-19 NOTE — TELEPHONE ENCOUNTER
Health News message sent to offer appointment for 4/7 with Aldo at .    PASQUALE Mcknight [facility]   Medical Specialties: Rheumatology & Infectious Disease  Phone: [phone]  Fax: [phone]

## 2025-03-20 ENCOUNTER — TRANSFERRED RECORDS (OUTPATIENT)
Dept: HEALTH INFORMATION MANAGEMENT | Facility: CLINIC | Age: 55
End: 2025-03-20
Payer: COMMERCIAL

## 2025-04-06 NOTE — PROGRESS NOTES
"        Rheumatology Clinic Visit       Siri Vyas  YOB: 1970    Age: 54 year old   MRN# [id number]       Date of Visit: 04/07/2025  Primary care provider: Chapito Coleman         Assessment and Plan:     1) Sjogren's syndrome (mild SICCA, +DEBBIE, +SSA, +anti-histone): as of 12/02/2024, Symptoms of Sicca complex continue, eye doctor continues to recommend eye drops. Wakes at night needing to drink. Has 30+ min of am stiffness of hands in am. Exam with normal conjunctivae, moist mucous membranes, excellent dentition.  Dry mucous membrane lesions. Decreased but present saliva pool.      2) Hx of PE, son with ILD/ antisynthetase syndrome and PE: LAC negative 2018,  B2GC and CLA negative     3) R ulnar neuropathy: R 5,4 digit numbness Previous L cubital and carpal tunnel release.  Patient plans surgical correction at the right elbow; she is already succeeded with relief of left hand symptoms with elbow surgery.    4) Knee OA: Patient reports \"80%\" improvement after bilateral knee injections in recent months    Data: On March 21, 2025, creatinine, albumin, transaminases, CRP, and CBC were all normal; sedimentation rate normal at 7; urinalysis clear; ophthalmology evaluation on January 27, 2025 yielded impression of long-term use of hydroxychloroquine without signs of ocular toxicity with normal macular OCT.    Imaging: Parotid ultrasound on December 5, 2024 showed normal appearance of bilateral parotid glands and lymph nodes.    Discussion: Symptoms of dry mouth are persistent, but stable.  Dry eye symptoms are not prominent.  Patient reports diffuse polyarthralgia is improved in the 4 months since starting hydroxychloroquine.  For Sjogren syndrome, and inflammatory arthritis, seronegative, I recommend continuing hydroxychloroquine for now.  Continue weightbearing exercises and weight management to help alleviate chronic knee pain from osteoarthritis.  We discussed ways to augment symptomatic treatment of dry " mouth, and discussed possibility of medication to help saliva production if dry mouth worsens.    We discussed in clinic:    Diagnosis:  1.  Sjogren syndrome (autoimmune disease of the glands): Dry mouth symptoms are mostly controlled  2.  Knee osteoarthritis  3.  Inflammatory arthritis: Improved with hydroxychloroquine.    Plan:  1.  Continue frequent drinking water by the bedside, make a trial of Biotene products spray, toothpaste, gel, use sugarless candy or lozenges.  2.  Consider use of saliva producing medication cevimeline or pilocarpine if dry mouth worsens  3.  Continue follow-up with sports medicine for knee osteoarthritis  4.  Continue hydroxychloroquine: If gastrointestinal symptoms persist, make a trial of discontinuing hydroxychloroquine for 2 weeks.  If symptoms improve with medicine gone, restart medicine at half dose (200 mg just once daily).  While taking hydroxychloroquine, undergo annual ophthalmology evaluation for rare retinal toxicity.  5.  Blood work for kidney function, blood counts, and urinalysis every 6 to 12 months.    RTC 6 mos    Esau Carlson MD  Staff Rheumatologist, [facility]    For Geneva Pantoja, who is temporarily out of office.    The longitudinal plan of care for the diagnosis(es)/condition(s) as documented were addressed during this visit. Due to the added complexity in care, I will continue to support Siri in the subsequent management and with ongoing continuity of care.    42 minute spent on the date of the encounter doing chart review, history and exam, documentation and further activities per the note.         Subjective:     Siri is a 53 year old female presents today for follow-up to consult +DEBBIE.  Patient is usually followed by Geneva Pantoja, who is out of office on leave.  Patient last saw Geneva Pantoja in December 2024.  At that visit, suspicion for autoimmune exocrinopathy was raised.  Patient was started on hydroxychloroquine, and symptomatic  "treatment for sicca symptoms was recommended.      Rheum hx: in 2023, Pt was donating blood and received a notification that she had HLA abys, her DEBBIE was checked and was found to be + and referred to rheumatology. CTD ROS + for dry eye mild, slight dry mouth at night, prolonged am stiffness of 1 hour bl knees. General ROS significant fatigue  CANDELARIO, CBC, CR, UA SSA SSB negative and CBC, CR, and UA normal.    Interval history April 7, 2025    She states that joint pain is improved compared to last visit. She had bilateral knee injections from Dr. Amezcua, and reports that she I s\"80%\" better.  She is walking 1-1/2 to 2 miles a day, and striving to lose weight.  No morning stiffness, and there is improvement in other joint pain in association with starting hydroxychloroquine in December.    Dry mouth continues, she has water at bedside and takes small sips frequently.  No sores in mouth or nose. She needs some drink in order to take in dry foods.   No swelling in facial glands or under the jaw.    She takes hydroxychloroquine 200 mg in the mornings, and in evening.  She thinks that fingers, elbows, knees are better with hydroxychloroquine.     She reports undergoing  Hydroxychloroquine toxicity exam in January 2025, provider reportedly stated (no toxicity close (    Geneva Pantoja history, 12/02/2024:  New HA's. Did go to ER for this. Being referred to neurologist for what is thought to be migraine.     Consult 8/01/2024 HPI:   Went to donate blood and found to have HLA juana, her DEBBIE was checked through PCP and primary and was referred.   Back: Has had 5 years back pain that radiates down left buttock to back of thigh is worse when sits down at desk. Is trying to work out. Is getting injections, had second, may get ablation. Did have MRI.   Joints: Knees hurt when she gets up in the morning, knees can be stiff for over an hour. Feels like legs can give out. Has had injections in Knees in past, been awhile, over 4 years " ago.     2007 had large work up for PE and was negative and thought to be rt to BC.  ROS: Patient denies recent infections, fevers, [place], weight loss, diarrhea, rashes, SOB, mouth sores, frequent miscarriages, sickness in sun, raynauds, skin turning tight. No knowing thyroid dz.   +Dry eye mild - eye  Suggested drops, said they were a little dry. Does wake up at night to drink water.   +PE on BC  +Fatigue  +R 5th digit and sometimes 4th digit numb.    ROS: she has noted more loose stools       Past Rheum tx:    Social History  [place], , father and children live with her.    for [employer], previous research   No tobacco  ETOH-social  Walking 5-7 a day   Boating, concerts, takes care of dad  Place up [place]    FMH:  Son -ILD antisythatase syndrome, 28 yrs old. PE  Cousin- Lupus  Son with Klinefelters  Daughter 2 blood clots, factor V Leiden   Factor v Leiden    Active Problem list:  Patient Active Problem List    Diagnosis Date Noted    Ulnar neuropathy at elbow, left 12/20/2024     Priority: Medium    Class 2 severe obesity due to excess calories with serious comorbidity in adult (H) 10/22/2024     Priority: Medium    RLQ abdominal pain 09/18/2024     Priority: Medium    Other spondylosis, lumbar region 06/11/2024     Priority: Medium    Caregiver role strain 12/10/2021     Priority: Medium    Chronic pain of both knees 02/18/2019     Priority: Medium    Primary osteoarthritis of both knees 10/11/2018     Priority: Medium    Grief reaction 09/10/2018     Priority: Medium    Pain and swelling of left knee 09/07/2018     Priority: Medium    Acute left-sided low back pain without sciatica 09/07/2018     Priority: Medium    Thrombophlebitis leg 07/20/2018     Priority: Medium    Saphenous vein clot, left 07/17/2018     Priority: Medium    Family history of malignant neoplasm of breast 03/15/2018     Priority: Medium    Class 2 obesity due to excess calories without serious  comorbidity with body mass index (BMI) of 39.0 to 39.9 in adult 03/15/2018     Priority: Medium    Chondromalacia patellae, right 2017     Priority: Medium    Chondromalacia, knee, left 2017     Priority: Medium    Headache 2015     Priority: Medium     Problem list name updated by automated process. Provider to review      CTS (carpal tunnel syndrome) 10/01/2014     Priority: Medium    Tendinitis of left wrist 2014     Priority: Medium    Epicondylitis, medial humeral 2014     Priority: Medium    Median neuropathy 2014     Priority: Medium    Paresthesias/numbness 2014     Priority: Medium     IMO Regulatory Load OCT 2020      LUQ abdominal pain 2012     Priority: Medium    S/P cholecystectomy 2012     Priority: Medium    Dermoid cyst of ovary 2012     Priority: Medium     Left ovary         H/O:  section 2012     Priority: Medium     X 4         CARDIOVASCULAR SCREENING; LDL GOAL LESS THAN 160 10/31/2010     Priority: Medium    Seasonal allergic rhinitis 2010     Priority: Medium       Current Outpatient Medications   Medication Sig Dispense Refill    acetaminophen (TYLENOL) 325 MG tablet Take 325-650 mg by mouth every 6 hours as needed      aspirin (ASA) 81 MG EC tablet Take 1 tablet (81 mg) by mouth daily 90 tablet 3    Cholecalciferol (VITAMIN D3) 25 MCG (1000 UT) CAPS Take 2,000 Units by mouth.      fexofenadine (ALLEGRA) 180 MG tablet Take  by mouth. 1 TABLET DAILY FOR ALLERGIES 90 tablet 0    GLUCOSAMINE-CHONDROITIN PO       hydroxychloroquine (PLAQUENIL) 200 MG tablet Take 1 tablet (200 mg) by mouth 2 times daily. Annual Plaquenil toxicity eye screening required. 180 tablet 3    magnesium oxide 400 MG CAPS Take by mouth daily      Misc Natural Products (OSTEO BI-FLEX JOINT SHIELD PO) Take by mouth daily.      Multiple Vitamin (MULTIVITAMINS PO)       Omega-3 Fatty Acids (OMEGA-3 FISH OIL PO)       propranolol ER (INDERAL LA) 60  MG 24 hr capsule Take 1 capsule (60 mg) by mouth daily.      Propylene Glycol (SYSTANE COMPLETE PF OP) Apply 1 drop to eye 2 times daily.      rizatriptan (MAXALT-MLT) 10 MG ODT Dissolve 1 tablet (10 mg) in mouth as needed. May repeat after two hours.  Maximum dose 30mg/24 hours.      benzonatate (TESSALON) 200 MG capsule Take 1 capsule (200 mg) by mouth 3 times daily as needed for cough. 30 capsule 0    riboflavin 100 MG CAPS Take 200 mg by mouth daily. (Patient not taking: Reported on 4/7/2025)      SUMAtriptan (IMITREX) 100 MG tablet Take 1 tablet (100 mg) by mouth at onset of headache for migraine. (Patient not taking: Reported on 4/7/2025)       No current facility-administered medications for this visit.              Objective:     Physical Exam  /76 (BP Location: Left arm, Patient Position: Sitting, Cuff Size: Adult Regular)   Pulse 66   Temp 97.8  F (36.6  C) (Oral)   Wt 96.3 kg (212 lb 6.4 oz)   LMP 03/01/2025 (Approximate)   SpO2 97%   Breastfeeding No   BMI 37.62 kg/m    Body mass index is 37.62 kg/m .    Constitutional: Lg body habitus with out gross deformities. Well groomed.   Psych: nl judgement, orientation, memory, affect.  Eyes: wnl EOM, PERRLA, vision. Injected dry conjunctiva  ENT: wnl external ears, nose, hearing, lips, teeth, gums, throat. Dry mucous membrane lesions. Decreased but present saliva pool  Neck: no mass, thyroid enlargement, enlarged cervical lymph nodes or parotid glands    Skin: no nail pitting, alopecia, rash, nodules or lesions of exposed areas of face, neck, bilateral upper and lower extremity   Neuro: Strength WNL of bilateral upper and lower extremity large muscle groups.     MSK- (T=tender to palpation, S=swelling)  TMJ: -T/S, FROM   Cervical spine:  FROM  Shoulders: -T/S, FROM   Elbows: -T/S, FROM   Wrists: -T/S, FROM   Hands: -T/S, FROM, Normal  strength. No dactylitis.   Hips: LROM  Knees: -T/S, crepitus slight limited flexion ROM   Ankles: -T/S, FROM No  enthesopathy or tenosynovitis.   Feet: -T/S, FROM . No dactylitis. R 1st MTP large bony change, L small      Labs:    Lab Results   Component Value Date    ALT 22 03/21/2025    AST 24 03/21/2025    CR 0.66 03/21/2025    CRP 10.5 (H) 04/24/2017      Latest Reference Range & Units 06/14/24 16:28   CRP Inflammation <5.00 mg/L 14.20 (H)   Rheumatoid Factor <14 IU/mL <10      Latest Reference Range & Units 06/14/24 16:28   DEBBIE interpretation Negative  Positive !   DEBBIE pattern 1  Speckled   DEBBIE titer 1  1:160      Latest Reference Range & Units 09/24/24 16:25   CRP Inflammation <5.00 mg/L 9.20 (H)      Latest Reference Range & Units 08/06/24 16:11   Histone Antibody,IgG 0.0 - 0.9 Units 1.3 (H)      Latest Reference Range & Units 08/06/24 16:11   Hepatitis C Antibody Nonreactive  Nonreactive      Latest Reference Range & Units 08/06/24 16:11   Beta 2 Glycoprotein 1 Antibody IgG <7.0 U/mL <0.8   Beta 2 Glycoprotein 1 Antibody IgM <7.0 U/mL <2.4   Thyroid Peroxidase Antibody <35 IU/mL <10      Latest Reference Range & Units 09/24/24 16:25   RNP Estefanía IgG Instrument Value <5.0 U/mL 2.0   Smith CANDELARIO Estefanía IgG Instrument Value <7.0 U/mL <0.7   SSA (Ro) Antibody IgG Negative  Positive !   SSA Estefanía IgG Instrument Value <7.0 U/mL 88.0 (H)   SSB (La) Antibody IgG Negative  Negative   SSB Estefanía IgG Instrument Value <7.0 U/mL 3.6     Imaging:   Narrative & Impression   MRI LUMBAR SPINE WITHOUT CONTRAST   6/4/2024 8:09 AM      HISTORY: Chronic low back pain, left posterior pelvic pain and  intermittent left greater than right leg pain; Chronic bilateral low  back pain, unspecified whether sciatica present; Somatic dysfunction  of pelvis region.     TECHNIQUE: Multiplanar multisequence MRI of the lumbar spine without  contrast.     COMPARISON: Lumbar spine MRI 12/21/2021.      FINDINGS:  Alignment is essentially within normal limits. Bone marrow  demonstrates minimal degenerative endplate changes including mild  edema and fatty marrow change  (mixed Modic 1/2) most conspicuous  surrounding the L5-S1 disc joint. Conus medullaris is unremarkable  terminating at the level of the L1-2 disc. Cauda equina is  unremarkable. Bilateral sacroiliac joint degenerative change. Small  areas of T2 hyperintense signal within bilateral kidneys, incompletely  characterized, presumably benign.     Segmental Analysis:      L1-L2:  Disc height maintained. No herniation. Mild bilateral facet  arthropathy. No foraminal or spinal canal stenosis.       L2-L3:  Mild disc height loss. Minimal bulge. Mild bilateral facet  arthropathy. No foraminal or spinal canal stenosis.       L3-L4:  Mild disc height loss. Minimal bulge. Mild bilateral facet  arthropathy. No foraminal or spinal canal stenosis.       L4-L5:  Mild disc height loss. Disc bulge, asymmetric to the right  with shallow right foraminal component, similar compared to the prior.  Moderate bilateral facet arthropathy. Mild right foraminal stenosis.  No left foraminal stenosis. Mild spinal canal stenosis.       L5-S1:  Mild disc height loss. Minimal bulge. Mild right and moderate  left facet arthropathy. No right foraminal stenosis. Mild left  foraminal stenosis. No spinal canal stenosis.                                                                      IMPRESSION:       1. Mild degenerative change as detailed.  2. No high-grade stenoses.     MIKE CHOE MD

## 2025-04-07 ENCOUNTER — OFFICE VISIT (OUTPATIENT)
Dept: RHEUMATOLOGY | Facility: CLINIC | Age: 55
End: 2025-04-07
Payer: COMMERCIAL

## 2025-04-07 VITALS
SYSTOLIC BLOOD PRESSURE: 110 MMHG | OXYGEN SATURATION: 97 % | HEART RATE: 66 BPM | DIASTOLIC BLOOD PRESSURE: 76 MMHG | WEIGHT: 212.4 LBS | BODY MASS INDEX: 37.62 KG/M2 | TEMPERATURE: 97.8 F

## 2025-04-07 DIAGNOSIS — R76.8 POSITIVE ANA (ANTINUCLEAR ANTIBODY): ICD-10-CM

## 2025-04-07 DIAGNOSIS — M35.00 SICCA COMPLEX: ICD-10-CM

## 2025-04-07 DIAGNOSIS — M35.00 PRIMARY SJOGREN'S SYNDROME: ICD-10-CM

## 2025-04-07 DIAGNOSIS — R76.8 ELEVATED ANTINUCLEAR ANTIBODY (ANA) LEVEL: ICD-10-CM

## 2025-04-07 RX ORDER — HYDROXYCHLOROQUINE SULFATE 200 MG/1
200 TABLET, FILM COATED ORAL 2 TIMES DAILY
Qty: 180 TABLET | Refills: 3 | Status: SHIPPED | OUTPATIENT
Start: 2025-04-07

## 2025-04-07 RX ORDER — RIZATRIPTAN BENZOATE 10 MG/1
TABLET, ORALLY DISINTEGRATING ORAL
COMMUNITY
Start: 2025-03-20

## 2025-04-07 NOTE — PATIENT INSTRUCTIONS
Diagnosis:  1.  Sjogren syndrome (autoimmune disease of the glands): Dry mouth symptoms are mostly controlled  2.  Knee osteoarthritis  3.  Inflammatory arthritis: Improved with hydroxychloroquine.    Plan:  1.  Continue frequent drinking water by the bedside, make a trial of Biotene products spray, toothpaste, gel, use sugarless candy or lozenges.  2.  Consider use of saliva producing medication cevimeline or pilocarpine if dry mouth worsens  3.  Continue follow-up with sports medicine for knee osteoarthritis  4.  Continue hydroxychloroquine: If gastrointestinal symptoms persist, make a trial of discontinuing hydroxychloroquine for 2 weeks.  If symptoms improve with medicine gone, restart medicine at half dose (200 mg just once daily).  While taking hydroxychloroquine, undergo annual ophthalmology evaluation for rare retinal toxicity.  5.  Blood work for kidney function, blood counts, and urinalysis every 6 to 12 months.

## 2025-04-07 NOTE — NURSING NOTE
"Siri Vyas's goals for this visit include:   Chief Complaint   Patient presents with    RECHECK     Positive DEBBIE (antinuclear antibody)  Sicca complex  SS-A antibody positive      Plaquenil     Plaquenil eye exam up to date 1/27/25 at [facility] eye       PCP: Chapito Coleman    Referring Provider:  Referred Self, MD  No address on file      Initial /76 (BP Location: Left arm, Patient Position: Sitting, Cuff Size: Adult Regular)   Pulse 66   Temp 97.8  F (36.6  C) (Oral)   Wt 96.3 kg (212 lb 6.4 oz)   LMP 03/01/2025 (Approximate)   SpO2 97%   Breastfeeding No   BMI 37.62 kg/m   Estimated body mass index is 37.62 kg/m  as calculated from the following:    Height as of 12/31/24: 1.6 m (5' 3\").    Weight as of this encounter: 96.3 kg (212 lb 6.4 oz).    Medication Reconciliation: complete    Do you need any medication refills at today's visit? none    PASQUALE Bassett  Rheumatology/Infectious disease  [facility]   [phone]      "

## 2025-04-10 ENCOUNTER — PATIENT OUTREACH (OUTPATIENT)
Dept: CARE COORDINATION | Facility: CLINIC | Age: 55
End: 2025-04-10
Payer: COMMERCIAL

## 2025-04-14 ENCOUNTER — OFFICE VISIT (OUTPATIENT)
Dept: URGENT CARE | Facility: URGENT CARE | Age: 55
End: 2025-04-14
Payer: COMMERCIAL

## 2025-04-14 VITALS
DIASTOLIC BLOOD PRESSURE: 80 MMHG | HEART RATE: 76 BPM | RESPIRATION RATE: 16 BRPM | BODY MASS INDEX: 37.56 KG/M2 | WEIGHT: 212 LBS | SYSTOLIC BLOOD PRESSURE: 126 MMHG | TEMPERATURE: 98.2 F | OXYGEN SATURATION: 98 %
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
## 2025-04-14 DIAGNOSIS — L03.311 CELLULITIS OF ABDOMINAL WALL: ICD-10-CM

## 2025-04-14 DIAGNOSIS — S30.861A TICK BITE OF ABDOMEN, INITIAL ENCOUNTER: Primary | ICD-10-CM

## 2025-04-14 PROCEDURE — 99213 OFFICE O/P EST LOW 20 MIN: CPT | Performed by: EMERGENCY MEDICINE

## 2025-04-14 PROCEDURE — 3079F DIAST BP 80-89 MM HG: CPT | Performed by: EMERGENCY MEDICINE

## 2025-04-14 PROCEDURE — 3074F SYST BP LT 130 MM HG: CPT | Performed by: EMERGENCY MEDICINE

## 2025-04-14 RX ORDER — DOXYCYCLINE 100 MG/1
100 CAPSULE ORAL 2 TIMES DAILY
Qty: 10 CAPSULE | Refills: 0 | Status: SHIPPED | OUTPATIENT
Start: 2025-04-14 | End: 2025-04-19

## 2025-04-14 NOTE — PROGRESS NOTES
Urgent Care Clinic Visit    Chief Complaint   Patient presents with    Tick Bite     Pulled off tick on stomach yesterday - area is red, tender and round. Pt is freaking out she is scheduled for surgery on Wednesday.     Rash               4/14/2025     5:09 PM   Additional Questions   Roomed by clint smith   Accompanied by n/a

## 2025-04-14 NOTE — PROGRESS NOTES
Assessment & Plan     Diagnosis:    ICD-10-CM    1. Tick bite of abdomen, initial encounter  S30.861A doxycycline hyclate (VIBRAMYCIN) 100 MG capsule    W57.XXXA       2. Cellulitis of abdominal wall  L03.311 doxycycline hyclate (VIBRAMYCIN) 100 MG capsule          Medical Decision Making:  Siri Vyas is a 54 year old female who presents for evaluation of a tick bite.  The tick was removed by the patient and it appears all of the tick parts have been successfully removed.  There are signs of cellulitis at this time.  Will treat with antibiotics (doxycycline) noted above.  Given the time course and symptoms, testing for tick disease as this point is not indicated.  Patient is agreeable with this plan for prophylaxis. There is no signs of abscess on physical exam.  Discussed Lyme's symptoms warranting further work up and recommended recheck with PCP next week. All questions answered.    SHRADDHA Olivo [facility]    Subjective     Siri Vyas is a 54 year old female who presents to clinic today for the following health issues:  Chief Complaint   Patient presents with    Tick Bite     Pulled off tick on stomach yesterday - area is red, tender and round. Pt is freaking out she is scheduled for surgery on Wednesday.     Rash       HPI  Patient states that she pulled a tick off of her stomach yesterday, the area is red and tender, getting a little more painful.  She thinks she had the tick for less than 24 hours as she had no prior exposure areas.  No other rashes, fever, joint pains, palpitations, shortness of breath or other symptoms.    Review of Systems    See HPI    Objective      Vitals: /80 (BP Location: Left arm, Patient Position: Sitting, Cuff Size: Adult Large)   Pulse 76   Temp 98.2  F (36.8  C) (Tympanic)   Resp 16   Wt 96.2 kg (212 lb)   LMP 04/08/2025 (Exact Date)   SpO2 98%   BMI 37.56 kg/m      Patient Vitals for the past 24 hrs:   BP Temp Temp src Pulse Resp  SpO2 Weight   04/14/25 1710 126/80 98.2  F (36.8  C) Tympanic 76 16 98 % 96.2 kg (212 lb)       Vital signs reviewed by: Shreyas Kennedy PA-C    Physical Exam   Constitutional: Patient is alert. No acute distress.  Neurological: Alert and oriented x3.   Skin: There is a ~2 cm circular rash on the abdomen near the umbilicus. No foreign body or insect parts noted. No concentric ring/bulls-eye signs. No fluctuance or areas of pointing. No lymphangitis or rash involving the mucous membranes. No petechia or purpura.   Psychiatric:The patient has a normal mood and affect.       Shreyas Kennedy PA-C, April 14, 2025

## 2025-04-15 ENCOUNTER — ANESTHESIA EVENT (OUTPATIENT)
Dept: SURGERY | Facility: AMBULATORY SURGERY CENTER | Age: 55
End: 2025-04-15
Payer: COMMERCIAL

## 2025-04-16 ENCOUNTER — HOSPITAL ENCOUNTER (OUTPATIENT)
Facility: AMBULATORY SURGERY CENTER | Age: 55
Discharge: HOME | End: 2025-04-16
Attending: STUDENT IN AN ORGANIZED HEALTH CARE EDUCATION/TRAINING PROGRAM
Payer: COMMERCIAL

## 2025-04-16 ENCOUNTER — ANESTHESIA (OUTPATIENT)
Dept: SURGERY | Facility: AMBULATORY SURGERY CENTER | Age: 55
End: 2025-04-16
Payer: COMMERCIAL

## 2025-04-16 VITALS
RESPIRATION RATE: 16 BRPM | DIASTOLIC BLOOD PRESSURE: 54 MMHG | SYSTOLIC BLOOD PRESSURE: 94 MMHG | WEIGHT: 212 LBS | HEART RATE: 62 BPM | BODY MASS INDEX: 37.56 KG/M2 | OXYGEN SATURATION: 94 % | TEMPERATURE: 97.5 F

## 2025-04-16 DIAGNOSIS — G56.21 ULNAR NEUROPATHY AT ELBOW, RIGHT: Primary | ICD-10-CM

## 2025-04-16 LAB
HCG UR QL: NEGATIVE
INTERNAL QC OK POCT: NORMAL
POCT KIT EXPIRATION DATE: NORMAL
POCT KIT LOT NUMBER: NORMAL

## 2025-04-16 RX ORDER — SODIUM CHLORIDE, SODIUM LACTATE, POTASSIUM CHLORIDE, CALCIUM CHLORIDE 600; 310; 30; 20 MG/100ML; MG/100ML; MG/100ML; MG/100ML
INJECTION, SOLUTION INTRAVENOUS CONTINUOUS PRN
Status: DISCONTINUED | OUTPATIENT
Start: 2025-04-16 | End: 2025-04-16

## 2025-04-16 RX ORDER — ONDANSETRON 4 MG/1
4 TABLET, ORALLY DISINTEGRATING ORAL EVERY 30 MIN PRN
Status: DISCONTINUED | OUTPATIENT
Start: 2025-04-16 | End: 2025-04-17 | Stop reason: HOSPADM

## 2025-04-16 RX ORDER — LABETALOL HYDROCHLORIDE 5 MG/ML
10 INJECTION, SOLUTION INTRAVENOUS
Status: DISCONTINUED | OUTPATIENT
Start: 2025-04-16 | End: 2025-04-17 | Stop reason: HOSPADM

## 2025-04-16 RX ORDER — NALOXONE HYDROCHLORIDE 0.4 MG/ML
0.1 INJECTION, SOLUTION INTRAMUSCULAR; INTRAVENOUS; SUBCUTANEOUS
Status: DISCONTINUED | OUTPATIENT
Start: 2025-04-16 | End: 2025-04-17 | Stop reason: HOSPADM

## 2025-04-16 RX ORDER — NALOXONE HYDROCHLORIDE 0.4 MG/ML
0.4 INJECTION, SOLUTION INTRAMUSCULAR; INTRAVENOUS; SUBCUTANEOUS
Status: DISCONTINUED | OUTPATIENT
Start: 2025-04-16 | End: 2025-04-17 | Stop reason: HOSPADM

## 2025-04-16 RX ORDER — KETOROLAC TROMETHAMINE 30 MG/ML
INJECTION, SOLUTION INTRAMUSCULAR; INTRAVENOUS PRN
Status: DISCONTINUED | OUTPATIENT
Start: 2025-04-16 | End: 2025-04-16

## 2025-04-16 RX ORDER — PROPOFOL 10 MG/ML
INJECTION, EMULSION INTRAVENOUS CONTINUOUS PRN
Status: DISCONTINUED | OUTPATIENT
Start: 2025-04-16 | End: 2025-04-16

## 2025-04-16 RX ORDER — NALOXONE HYDROCHLORIDE 0.4 MG/ML
0.2 INJECTION, SOLUTION INTRAMUSCULAR; INTRAVENOUS; SUBCUTANEOUS
Status: DISCONTINUED | OUTPATIENT
Start: 2025-04-16 | End: 2025-04-17 | Stop reason: HOSPADM

## 2025-04-16 RX ORDER — FENTANYL CITRATE 50 UG/ML
50 INJECTION, SOLUTION INTRAMUSCULAR; INTRAVENOUS EVERY 5 MIN PRN
Status: DISCONTINUED | OUTPATIENT
Start: 2025-04-16 | End: 2025-04-17 | Stop reason: HOSPADM

## 2025-04-16 RX ORDER — HYDROMORPHONE HYDROCHLORIDE 1 MG/ML
0.4 INJECTION, SOLUTION INTRAMUSCULAR; INTRAVENOUS; SUBCUTANEOUS EVERY 5 MIN PRN
Status: DISCONTINUED | OUTPATIENT
Start: 2025-04-16 | End: 2025-04-17 | Stop reason: HOSPADM

## 2025-04-16 RX ORDER — CEPHALEXIN 500 MG/1
500 CAPSULE ORAL 4 TIMES DAILY
Qty: 12 CAPSULE | Refills: 0 | Status: SHIPPED | OUTPATIENT
Start: 2025-04-16

## 2025-04-16 RX ORDER — FENTANYL CITRATE 50 UG/ML
25 INJECTION, SOLUTION INTRAMUSCULAR; INTRAVENOUS EVERY 5 MIN PRN
Status: DISCONTINUED | OUTPATIENT
Start: 2025-04-16 | End: 2025-04-17 | Stop reason: HOSPADM

## 2025-04-16 RX ORDER — FENTANYL CITRATE 50 UG/ML
INJECTION, SOLUTION INTRAMUSCULAR; INTRAVENOUS PRN
Status: DISCONTINUED | OUTPATIENT
Start: 2025-04-16 | End: 2025-04-16

## 2025-04-16 RX ORDER — CEFAZOLIN SODIUM 2 G/50ML
2 SOLUTION INTRAVENOUS SEE ADMIN INSTRUCTIONS
Status: DISCONTINUED | OUTPATIENT
Start: 2025-04-16 | End: 2025-04-17 | Stop reason: HOSPADM

## 2025-04-16 RX ORDER — LIDOCAINE 40 MG/G
CREAM TOPICAL
Status: DISCONTINUED | OUTPATIENT
Start: 2025-04-16 | End: 2025-04-17 | Stop reason: HOSPADM

## 2025-04-16 RX ORDER — ONDANSETRON 4 MG/1
4 TABLET, FILM COATED ORAL EVERY 6 HOURS PRN
Qty: 12 TABLET | Refills: 0 | Status: SHIPPED | OUTPATIENT
Start: 2025-04-16

## 2025-04-16 RX ORDER — ONDANSETRON 2 MG/ML
4 INJECTION INTRAMUSCULAR; INTRAVENOUS EVERY 30 MIN PRN
Status: DISCONTINUED | OUTPATIENT
Start: 2025-04-16 | End: 2025-04-17 | Stop reason: HOSPADM

## 2025-04-16 RX ORDER — FLUMAZENIL 0.1 MG/ML
0.2 INJECTION, SOLUTION INTRAVENOUS
Status: DISCONTINUED | OUTPATIENT
Start: 2025-04-16 | End: 2025-04-17 | Stop reason: HOSPADM

## 2025-04-16 RX ORDER — OXYCODONE HYDROCHLORIDE 5 MG/1
5 TABLET ORAL
Status: DISCONTINUED | OUTPATIENT
Start: 2025-04-16 | End: 2025-04-17 | Stop reason: HOSPADM

## 2025-04-16 RX ORDER — OXYCODONE HYDROCHLORIDE 5 MG/1
5 TABLET ORAL EVERY 6 HOURS PRN
Qty: 12 TABLET | Refills: 0 | Status: SHIPPED | OUTPATIENT
Start: 2025-04-16 | End: 2025-04-19

## 2025-04-16 RX ORDER — LIDOCAINE HYDROCHLORIDE 20 MG/ML
INJECTION, SOLUTION INFILTRATION; PERINEURAL PRN
Status: DISCONTINUED | OUTPATIENT
Start: 2025-04-16 | End: 2025-04-16

## 2025-04-16 RX ORDER — HYDROMORPHONE HYDROCHLORIDE 1 MG/ML
0.2 INJECTION, SOLUTION INTRAMUSCULAR; INTRAVENOUS; SUBCUTANEOUS EVERY 5 MIN PRN
Status: DISCONTINUED | OUTPATIENT
Start: 2025-04-16 | End: 2025-04-17 | Stop reason: HOSPADM

## 2025-04-16 RX ORDER — OXYCODONE HYDROCHLORIDE 5 MG/1
10 TABLET ORAL
Status: DISCONTINUED | OUTPATIENT
Start: 2025-04-16 | End: 2025-04-17 | Stop reason: HOSPADM

## 2025-04-16 RX ORDER — PROPOFOL 10 MG/ML
INJECTION, EMULSION INTRAVENOUS PRN
Status: DISCONTINUED | OUTPATIENT
Start: 2025-04-16 | End: 2025-04-16

## 2025-04-16 RX ORDER — DEXAMETHASONE SODIUM PHOSPHATE 10 MG/ML
4 INJECTION, SOLUTION INTRAMUSCULAR; INTRAVENOUS
Status: DISCONTINUED | OUTPATIENT
Start: 2025-04-16 | End: 2025-04-17 | Stop reason: HOSPADM

## 2025-04-16 RX ORDER — CEFAZOLIN SODIUM 2 G/50ML
2 SOLUTION INTRAVENOUS
Status: COMPLETED | OUTPATIENT
Start: 2025-04-16 | End: 2025-04-16

## 2025-04-16 RX ORDER — FENTANYL CITRATE 50 UG/ML
25-50 INJECTION, SOLUTION INTRAMUSCULAR; INTRAVENOUS
Status: DISCONTINUED | OUTPATIENT
Start: 2025-04-16 | End: 2025-04-17 | Stop reason: HOSPADM

## 2025-04-16 RX ORDER — ACETAMINOPHEN 325 MG/1
975 TABLET ORAL ONCE
Status: COMPLETED | OUTPATIENT
Start: 2025-04-16 | End: 2025-04-16

## 2025-04-16 RX ORDER — BUPIVACAINE HYDROCHLORIDE AND EPINEPHRINE 5; 5 MG/ML; UG/ML
INJECTION, SOLUTION PERINEURAL
Status: COMPLETED | OUTPATIENT
Start: 2025-04-16 | End: 2025-04-16

## 2025-04-16 RX ADMIN — ACETAMINOPHEN 975 MG: 325 TABLET ORAL at 08:16

## 2025-04-16 RX ADMIN — PROPOFOL 150 MCG/KG/MIN: 10 INJECTION, EMULSION INTRAVENOUS at 09:50

## 2025-04-16 RX ADMIN — ONDANSETRON 4 MG: 2 INJECTION INTRAMUSCULAR; INTRAVENOUS at 10:34

## 2025-04-16 RX ADMIN — BUPIVACAINE HYDROCHLORIDE AND EPINEPHRINE 20 ML: 5; 5 INJECTION, SOLUTION PERINEURAL at 08:50

## 2025-04-16 RX ADMIN — FENTANYL CITRATE 50 MCG: 50 INJECTION, SOLUTION INTRAMUSCULAR; INTRAVENOUS at 08:48

## 2025-04-16 RX ADMIN — SODIUM CHLORIDE, SODIUM LACTATE, POTASSIUM CHLORIDE, CALCIUM CHLORIDE: 600; 310; 30; 20 INJECTION, SOLUTION INTRAVENOUS at 09:46

## 2025-04-16 RX ADMIN — LIDOCAINE HYDROCHLORIDE 80 MG: 20 INJECTION, SOLUTION INFILTRATION; PERINEURAL at 09:50

## 2025-04-16 RX ADMIN — FENTANYL CITRATE 50 MCG: 50 INJECTION, SOLUTION INTRAMUSCULAR; INTRAVENOUS at 09:55

## 2025-04-16 RX ADMIN — PROPOFOL 70 MG: 10 INJECTION, EMULSION INTRAVENOUS at 09:50

## 2025-04-16 RX ADMIN — CEFAZOLIN SODIUM 2 G: 2 SOLUTION INTRAVENOUS at 09:44

## 2025-04-16 RX ADMIN — KETOROLAC TROMETHAMINE 30 MG: 30 INJECTION, SOLUTION INTRAMUSCULAR; INTRAVENOUS at 10:34

## 2025-04-16 NOTE — OP NOTE
HAND SURGERY OPERATIVE REPORT     Date of Surgery: 4/16/2025  Surgical Service: Ortho Hand     Preoperative Diagnosis: Right ulnar neuropathy at the elbow     Postoperative Diagnosis: Same as preoperative diagnosis     Procedures Performed: Right ulnar nerve decompression at the elbow     Attending:  LYNDON Hendrix MD, PhD, FACS  Assistant: None     Complications: None apparent  Specimens: None  Implants: None  Estimated blood loss: < 5 mL  Tourniquet time: 15 minutes  Wound classification: Clean  Anesthesia: MAC with right supraclavicular block     Indications for Procedure: 54 year-old right-hand-dominant non-smoker presenting with right ulnar neuropathy.  The nerve study in October revealed conduction velocity of the ulnar nerve at the elbow of 36 m/s.  She continues to have symptoms that are refractory to conservative management including elbow pad use.    Discussed the risks of surgery, including but not limited to: infection, bleeding, pain, poor scarring, wound healing issues, injury to nerves and/or tendons, neuroma formation, recurrence of the condition including pain, need for revision or additional surgery, complex regional pain syndrome, anesthesia-related complication, DVT/PE, death. Despite these risks, patient consents to RIGHT ulnar nerve decompression at the elbow with possible anterior transposition. All questions answered.      Intraoperative findings: Compression underneath Aburto's ligament as well as the [place] and the proximal leading edge of the flexor-pronator fascia. No subluxation of the ulnar nerve with passive extreme elbow flexion. MABC branches preserved.     Description of Procedure: Patient was seen in the preoperative holding area.  Consent was verified.  The right elbow was marked.  All additional questions were answered.  The risks of the surgery were reiterated, including (but not limited to): infection, bleeding, scarring, pain, injury to surrounding structures  including nerves and tendons, need for additional revision surgery, neuroma formation, complex regional pain syndrome, stiffness.  Following discussion of all these risks, the patient again agreed to proceed with surgery.  Patient was then transferred to the operating room and placed supine on the operating table.  All pressure points were padded.  Sequential compression devices were placed on bilateral lower extremities and verified to be operational.  IV antibiotic prophylaxis was given.  Preinduction timeout was performed.  Monitored anesthesia care was commenced.  At the start of the case, the right upper extremity was prepped and draped in usual sterile fashion. A sterile tourniquet was placed on the right arm.The extremity was exsanguinated and the tourniquet was inflated to 250 mm Hg. Next, a 6-7 cm incision was made, centered over the interval between the medial humeral epicondyle and olecranon, extending proximally and distally. Once through skin, a MABC branches were identified over the deep fascia, dissected with tenotomies and protected. Next, the ulnar nerve was found proximally between the triceps and medial brachial septum. The deep fascia was unroofed over the nerve and dissection carried proximally until a thickened [place] was encountered, and which was divided. Next, the dissection was carried distally and the thickened Aburto's ligament was divided. Next, the flexor-pronator fascia was sharply divided - at the interval between the two heads of the FCU. Once done, a gentle spread was done to expose the ulnar nerve. The ulnar nerve was fully decompressed without over-doing a neurolysis. Next, the elbow was passively flexed to reveal no subluxation of the nerve about the medial humeral epicondyle. The tourniquet was deflated. Hemostasis was obtained with bipolar electrocautery. Next, the incision was closed with 3-0 Vicryl and 3-0 Monocryl deep dermal suture, as well as a running 4-0  "Monocryl intracuticular suture. The incision was then dressed with skin adhesive and steri-strips. A 4 x 4\" gauze and ABD was used to pad the elbow, and gently wrapped with an ACE bandage. The patient was woken up and transferred to the post-anesthesia care unit with no events.      Postoperative plan: Clinic in 10-14 days.      Nilesh Hendrix MD, PhD, FACS  "

## 2025-04-16 NOTE — ANESTHESIA PREPROCEDURE EVALUATION
Anesthesia Pre-Procedure Evaluation    Patient: Siri Vyas   MRN: [id number] : 1970        Procedure : Procedure(s):  Right ulnar nerve decompression at the elbow with possible anterior transposition          Past Medical History:   Diagnosis Date     Anxiety disorder      AR (allergic rhinitis)      Bilateral primary osteoarthritis of knee 10/11/2018     Cancer (H) 2018    breast cancer, lung cancer mother     Cerebral infarction (H)     grandmother     Depression with anxiety 10/2006     High cholesterol 10/2005     Hypertension     Mother  and brother     PE (pulmonary embolism) 2007      Past Surgical History:   Procedure Laterality Date     ABDOMEN SURGERY  96,98,00,04, 99    4 c-sections, dermoid cyst     Dermoid cyst  (OVARY) removal       DESTRUCTION OF PARAVERTEBRAL FACET LUMBAR / SACRAL ADDITIONAL Bilateral 2024    Procedure: Bilateral L4-5, L5-S1 facet radiofrequency ablation;  Surgeon: Saima Macdonald MD;  Location: [facility] OR     GYN SURGERY  ,,,          HERNIA REPAIR       INJECT BLOCK MEDIAL BRANCH CERVICAL/THORACIC/LUMBAR Bilateral 2024    Procedure: BILATERAL L4-5, L5-S1 medial branch block #1;  Surgeon: Saima Macdonald MD;  Location: [facility] OR     INJECT BLOCK MEDIAL BRANCH CERVICAL/THORACIC/LUMBAR Bilateral 2024    Procedure: Bilateral L4-5, L5-S1 medial branch block #2;  Surgeon: Saima Macdonald MD;  Location: [facility] OR     LAPAROSCOPIC CHOLECYSTECTOMY       SINUS SURGERY  2002    Nasls sinuses     TONSILLECTOMY  1999      Allergies   Allergen Reactions     Dust Mites      No Clinical Screening - See Comments Other (See Comments)     No Known Drug Allergy      Ragweeds Other (See Comments)     Seasonal Allergies       Social History     Tobacco Use     Smoking status: Never     Smokeless tobacco: Never     Tobacco comments:     no second hand smoke   Substance Use Topics     Alcohol use: Yes     Comment: 1-2 days in a week.  Each time 1-2 beers. 0-5 beers weeks      Wt Readings from Last 1 Encounters:   04/14/25 96.2 kg (212 lb)        Anesthesia Evaluation   Pt has had prior anesthetic. Type: General and MAC.        ROS/MED HX  ENT/Pulmonary:  - neg pulmonary ROS     Neurologic:     (+)    peripheral neuropathy, - left ulnar nerve. migraines,                          Cardiovascular:     (+) Dyslipidemia hypertension- -   -  - -                                      METS/Exercise Tolerance:     Hematologic: Comments: Hx of dvt and PE in 2007    (+) History of blood clots,    pt is not anticoagulated,           Musculoskeletal:   (+)  arthritis,             GI/Hepatic:  - neg GI/hepatic ROS     Renal/Genitourinary:       Endo:     (+)               Obesity,       Psychiatric/Substance Use:     (+) psychiatric history anxiety and depression       Infectious Disease:       Malignancy:  - neg malignancy ROS     Other:            Physical Exam    Airway      Comment: Short neck    Mallampati: I   TM distance: > 3 FB   Neck ROM: full   Mouth opening: > 3 cm    Respiratory Devices and Support         Dental       (+) Minor Abnormalities - some fillings, tiny chips      Cardiovascular   cardiovascular exam normal       Rhythm and rate: regular     Pulmonary   pulmonary exam normal            OUTSIDE LABS:  CBC:   Lab Results   Component Value Date    WBC 9.4 03/21/2025    WBC 6.5 12/02/2024    HGB 13.8 03/21/2025    HGB 15.1 12/02/2024    HCT 41.8 03/21/2025    HCT 45.4 12/02/2024     03/21/2025     12/02/2024     BMP:   Lab Results   Component Value Date     10/15/2024     02/28/2020    POTASSIUM 4.5 10/15/2024    POTASSIUM 3.8 02/28/2020    CHLORIDE 103 10/15/2024    CHLORIDE 107 02/28/2020    CO2 25 10/15/2024    CO2 24 02/28/2020    BUN 13.1 10/15/2024    BUN 9 02/28/2020    CR 0.66 03/21/2025    CR 0.59 12/02/2024    GLC 98 10/15/2024    GLC 99 06/29/2023     COAGS:   Lab Results   Component Value Date    PTT 28  "10/15/2024    INR 0.99 10/15/2024     POC:   Lab Results   Component Value Date    HCGS Negative 11/11/2024     HEPATIC:   Lab Results   Component Value Date    ALBUMIN 4.3 03/21/2025    PROTTOTAL 7.9 02/28/2020    ALT 22 03/21/2025    AST 24 03/21/2025    ALKPHOS 68 02/28/2020    BILITOTAL 0.4 02/28/2020     OTHER:   Lab Results   Component Value Date    A1C 5.6 12/31/2024    MYKE 9.9 10/15/2024    LIPASE 99 04/24/2017    AMYLASE 59 04/11/2012    TSH 3.15 08/06/2024    T4 1.04 04/11/2012    CRP 10.5 (H) 04/24/2017    SED 7 03/21/2025       Anesthesia Plan    ASA Status:  3    NPO Status:  NPO Appropriate    Anesthesia Type: Peripheral Nerve Block.              Consents    Anesthesia Plan(s) and associated risks, benefits, and realistic alternatives discussed. Questions answered and patient/representative(s) expressed understanding.     - Discussed: Risks, Benefits and Alternatives for BOTH SEDATION and the PROCEDURE were discussed     - Discussed with:  Patient      - Extended Intubation/Ventilatory Support Discussed: No.      - Patient is DNR/DNI Status: No     Use of blood products discussed: No .     Postoperative Care    Pain management: Multi-modal analgesia.        Comments:               Emily Dinh MD    Clinically Significant Risk Factors Present on Admission                             # Obesity: Estimated body mass index is 37.56 kg/m  as calculated from the following:    Height as of 4/11/25: 1.6 m (5' 2.99\").    Weight as of 4/14/25: 96.2 kg (212 lb).                "

## 2025-04-16 NOTE — BRIEF OP NOTE
[facility]    Brief Operative Note    Pre-operative diagnosis: Ulnar neuropathy at elbow, right [G56.22]  Post-operative diagnosis Same as pre-operative diagnosis    Procedure: Right ulnar nerve decompression at the elbow, Right - Wrist    Surgeon: Surgeons and Role:     * Nilesh Hendrix MD - Primary     * Sharonda Grace MD - Resident - Assisting  Anesthesia: MAC with Block   Estimated Blood Loss: 10ml    Drains: None  Specimens: * No specimens in log *  Findings:   Right ulnar nerve decompression at the [place], brown's ligament and proximal edge of the FCU. No anterior transposition needed  Complications: None.  Implants: * No implants in log *      Sharonda Grace MD  Plastic Surgery PGY2

## 2025-04-16 NOTE — ANESTHESIA POSTPROCEDURE EVALUATION
Patient: Siri Vyas    Procedure: Procedure(s):  Right ulnar nerve decompression at the elbow       Anesthesia Type:  Peripheral Nerve Block    Note:  Disposition: Outpatient   Postop Pain Control: Uneventful            Sign Out: Well controlled pain   PONV: No   Neuro/Psych: Uneventful            Sign Out: Acceptable/Baseline neuro status   Airway/Respiratory: Uneventful            Sign Out: Acceptable/Baseline resp. status   CV/Hemodynamics: Uneventful            Sign Out: Acceptable CV status; No obvious hypovolemia; No obvious fluid overload   Other NRE: NONE   DID A NON-ROUTINE EVENT OCCUR? No       Last vitals:  Vitals Value Taken Time   BP 94/54 04/16/25 1145   Temp 36.4  C (97.52  F) 04/16/25 1146   Pulse 62 04/16/25 1145   Resp 16 04/16/25 1145   SpO2 91 % 04/16/25 1146   Vitals shown include unfiled device data.    Electronically Signed By: Emily Dinh MD  April 16, 2025  12:38 PM

## 2025-04-16 NOTE — PROGRESS NOTES
Ortho Hand    Medically optimized.  No changes in history or examination.    OR today for right ulnar nerve decompression at the elbow with possible anterior transposition.    Discussed the risks of surgery, including but not limited to: infection, bleeding, pain, poor scarring, wound healing issues, injury to nerves and/or tendons, neuroma formation, recurrence of the condition including pain, need for revision or additional surgery, complex regional pain syndrome, anesthesia-related complication, DVT/PE, death. Despite these risks, patient consents to RIGHT ulnar nerve decompression at the elbow with possible anterior transposition. All questions answered.     Nilesh Hendrix MD, PhD, FACS

## 2025-04-16 NOTE — DISCHARGE INSTRUCTIONS
Hand Surgery Discharge Instructions    Patient has been treated for ulnar nerve compression with Dr. Hendrix on 4/16/25.     Care: Please keep the splint/cast/dressing clean and dry at all times. Should it get wet, please call the clinic to schedule an appointment - as it may need to be replaced. Do not hesitate to use the sling to help protect the affected extremity and in particular in the setting of a nerve block.     Medications: You can take Ibuprofen 400-800 mg and Tylenol 650 mg for pain relief. Please take each every 6 hours, and for optimal pain relief - please stagger the medications so that you are taking one or the other every 3 hours. If you had a nerve block, the effects may last 8-12 hours. If you have been prescribed additional pain medications, please take as instructed and as needed. If you are taking additional pain medications, please do not exceed 4000 mg of Tylenol daily from all sources. Also, if you are taking narcotic medications, please do not operate heavy machinery or drive. If you have been prescribed antibiotics, please also take as instructed.     Diet: Start with clear liquids and slow down if nauseated. Advance the diet as tolerated.    Weight-bearing/Activities: Move your fingers to prevent stiffness. Elevate the affected extremity to improve throbbing sensation or pain. No strenuous activities and do not raise your heart rate above 100 bpm for the first few weeks. Limit your weight-bearing with the affected extremity to 5-7 pounds unless otherwise specified.    ER Instructions: Please call the office and/or consider return to the ER if you experience worsening pain not relieved by medications, increased swelling, redness or high fevers >101F or if there are unexpected problems like shortness of breath.    Post-op follow-up: Clinic in 2 weeks with Dr. Hendrix or his PA at the [facility]. Please call our Ortho triage line if you have any questions or concerns  before your clinic visit: [phone].    Nilesh Hendrix MD, PhD, [facility]  Home Care Following Anesthesia  For 24 hours after surgery:  Get plenty of rest.  A responsible adult must stay with you for at least 24 hours after you leave the surgery center.  Do not drive or use heavy equipment.  If you have weakness or tingling, don't drive or use heavy equipment until this feeling goes away.   Do not drink alcohol.   Avoid strenuous or risky activities.  Ask for help when climbing stairs.  You may feel lightheaded.  IF so, sit for a few minutes before standing.  Have someone help you get up.   If you have nausea (feel sick to your stomach): Drink only clear liquids such as apple juice, ginger ale, broth or 7-Up.  Rest may also help.  Be sure to drink enough fluids.  Move to a regular diet as you feel able.   You may have a slight fever.  Call the doctor if your fever is over 100 F (37.7 C) (taken under the tongue) or lasts longer than 24 hours.  You may have a dry mouth, a sore throat, muscle aches or trouble sleeping. These should go away after 24 hours.  Do not make important or legal decisions.   It is recommended to avoid smoking.               Tips for taking pain medications  To get the best pain relief possible, remember these points:  Take pain medications as directed, before pain becomes severe.  Pain medication can upset your stomach: taking it with food may help.  Constipation is a common side effect of pain medication. Drink plenty of  fluids.  Eat foods high in fiber. Take a stool softener if recommended by your doctor or pharmacist.  Do not drink alcohol, drive or operate machinery while taking pain medications.  Ask about other ways to control pain, such as with heat, ice or relaxation.    Tylenol/Acetaminophen Consumption    If you feel your pain relief is insufficient, you may take Tylenol/Acetaminophen in addition to your narcotic pain medication.    Be careful not to exceed 4,000 mg of Tylenol/Acetaminophen in a 24 hour period from all sources.  If you are taking extra strength Tylenol/acetaminophen (500 mg), the maximum dose is 8 tablets in 24 hours.  If you are taking regular strength acetaminophen (325 mg), the maximum dose is 12 tablets in 24 hours.    Call a doctor for any of the following:  Signs of infection (fever, growing tenderness at the surgery site, a large amount of drainage or bleeding, severe pain, foul-smelling drainage, redness, swelling).  It has been over 8 to 10 hours since surgery and you are still not able to urinate (pass water).  Headache for over 24 hours.  Numbness, tingling or weakness the day after surgery (if you had spinal anesthesia).  Signs of Covid-19 infection (temperature over 100 degrees, shortness of breath, cough, loss of taste/smell, generalized body aches, persistent headache, chills, sore throat, nausea/vomiting/diarrhea)    Your doctor is:       Dr. Nilesh Hendrix, Plastic Surgery: [phone]               For emergency care, call the:  [facility]:  [phone] (TTY for hearing impaired: [phone])

## 2025-04-16 NOTE — ANESTHESIA PROCEDURE NOTES
"Brachial plexus Procedure Note    Pre-Procedure   Staff -        Anesthesiologist:  Emily Dinh MD       Resident/Fellow: Steve Mcdermott MD       Performed By: resident       Location: pre-op       Pre-Anesthestic Checklist: patient identified, IV checked, site marked, risks and benefits discussed, informed consent, monitors and equipment checked, pre-op evaluation, at physician/surgeon's request and post-op pain management  Timeout:       Correct Patient: Yes        Correct Procedure: Yes        Correct Site: Yes        Correct Position: Yes        Correct Laterality: Yes        Site Marked: Yes  Procedure Documentation  Procedure: Brachial plexus         Laterality: right       Patient Position: sitting       Skin prep: Chloraprep (supraclavicular approach).       Needle Type: insulated       Needle Gauge: 21.        Needle Length (millimeters): 110        Ultrasound guided       1. Ultrasound was used to identify targeted nerve, plexus, vascular marker, or fascial plane and place a needle adjacent to it in real-time.       2. Ultrasound was used to visualize the spread of anesthetic in close proximity to the above referenced structure.    Assessment/Narrative         The placement was negative for: blood aspirated, painful injection and site bleeding       Paresthesias: No.       Bolus given via needle..        Secured via.        Insertion/Infusion Method: Single Shot       Complications: none    Medication(s) Administered   Bupivacaine 0.5% w/ 1:200K Epi (Other) - Other   20 mL - 4/16/2025 8:50:00 AM  Bupivacaine liposome (Exparel) 1.3% LA inj susp (Infiltration) - Infiltration   10 mL - 4/16/2025 8:50:00 AM    FOR [facility] ([facility] ONLY:   Pain Team Contact information: please page the Pain Team [address] [employer]. Search \"Pain\". During daytime hours, please page the attending first. At night please page the resident first.      "

## 2025-04-16 NOTE — ANESTHESIA CARE TRANSFER NOTE
Patient: Siri Vyas    Procedure: Procedure(s):  Right ulnar nerve decompression at the elbow       Diagnosis: Ulnar neuropathy at elbow, left [G56.22]  Diagnosis Additional Information: No value filed.    Anesthesia Type:   Peripheral Nerve Block     Note:    Oropharynx: oropharynx clear of all foreign objects and spontaneously breathing  Level of Consciousness: awake  Oxygen Supplementation: room air    Independent Airway: airway patency satisfactory and stable  Dentition: dentition unchanged  Vital Signs Stable: post-procedure vital signs reviewed and stable  Report to RN Given: handoff report given  Patient transferred to: Phase II    Handoff Report: Identifed the Patient, Identified the Reponsible Provider, Reviewed the pertinent medical history, Discussed the surgical course, Reviewed Intra-OP anesthesia mangement and issues during anesthesia, Set expectations for post-procedure period and Allowed opportunity for questions and acknowledgement of understanding      Vitals:  Vitals Value Taken Time   BP 90/54 04/16/25 1050   Temp 36.4  C (97.52  F) 04/16/25 1057   Pulse 71 04/16/25 1048   Resp 12 04/16/25 1050   SpO2 93 % 04/16/25 1057   Vitals shown include unfiled device data.    Electronically Signed By: QUANG Sanchez CRNA  April 16, 2025  10:58 AM

## 2025-04-16 NOTE — ADDENDUM NOTE
Addendum  created 04/16/25 1444 by Emily Dinh MD    Attestation recorded in Intraprocedure, Flowsheet accepted, Intraprocedure Attestations filed

## 2025-04-17 ENCOUNTER — TELEPHONE (OUTPATIENT)
Dept: PLASTIC SURGERY | Facility: CLINIC | Age: 55
End: 2025-04-17
Payer: COMMERCIAL

## 2025-04-17 NOTE — TELEPHONE ENCOUNTER
Other: pt had surgery with Dr. Hendrix yesterday.   Pt has a kind of cast on.  She is asking if she is to keep it on for 2 weeks and wear the sling until then.  She needs to keep it dry.       Could we send this information to you in [employer] or would you prefer to receive a phone call?:   Patient would prefer a phone call   Okay to leave a detailed message?: Yes at Cell number on file:    Telephone Information:   Mobile [phone]

## 2025-04-17 NOTE — TELEPHONE ENCOUNTER
Patient also calling about 2 week post op appointment, Dr Hendrix not available until may 9th when patient is scheduled. Could patient see Renay Drake on the 30th for the post op follow up?

## 2025-04-18 ENCOUNTER — TELEPHONE (OUTPATIENT)
Dept: PLASTIC SURGERY | Facility: CLINIC | Age: 55
End: 2025-04-18
Payer: COMMERCIAL

## 2025-04-18 NOTE — TELEPHONE ENCOUNTER
Patient Returning Call    Reason for call:  regarding post surgery concerns     Information relayed to patient:  n/a    Patient has additional questions:  Yes    What are your questions/concerns:  Per Patient : I had surgery on my right elbow on 4/16/25 and noticed that today 4/18/25 all my fingers are swollen and feels numb.     Who does the patient want to speak with:  RN    Is an  needed?:  No      Could we send this information to you in [employer] or would you prefer to receive a phone call?:   Patient would prefer a phone call   Okay to leave a detailed message?: Yes at Cell number on file:    Telephone Information:   Mobile [phone]

## 2025-04-22 NOTE — TELEPHONE ENCOUNTER
Called and talked with patient to discuss symptoms from Friday. She states on Friday she noticed more swelling and that her had was numb, she did have feeling on Thursday in her hand. She had not been elevating. Today her hand is feeling better with minimal swelling and no numbness. We discussed the numbness will come with swelling and elevating her hand is the correct thing to do. She may still experience swelling and numbness off and on through recovery. Currently her hand seems to be normal color and warm. She has the wrap on and the surgical prep so it is hard to see.   She has no concerns about her hand at this time.     She will be dropping off forms for her work. We will allow her to be off for up to 4 weeks.   Linda Fry RN

## 2025-04-29 ENCOUNTER — OFFICE VISIT (OUTPATIENT)
Dept: ORTHOPEDICS | Facility: CLINIC | Age: 55
End: 2025-04-29
Payer: COMMERCIAL

## 2025-04-29 DIAGNOSIS — Z98.890 S/P MUSCULOSKELETAL SYSTEM SURGERY: Primary | ICD-10-CM

## 2025-04-29 NOTE — PROGRESS NOTES
Chief Complaint:   Follow up, DOS 4/16/25 with Dr. Hendrix    Procedures:  Right ulnar nerve decompression at the elbow     History:  Siri Vyas is a 54 year old who presents to clinic for routine follow-up from right elbow surgery. She is overall doing well. She has already noted some improvement in the numbness she's had in her middle, ring and small finger on the right hand. Last Friday, some return of tingling occurred for no specific reason, but has since improved. She has removed her ACE and is out of the sling. She's been working on ROM exercises.     Denies fevers, chills or sweats. Has had swelling in the lateral elbow but not into forearm or wrist.      Exam:    General: Awake, Alert, and oriented. Articulates and communicates with a normal affect  Respirations even and unlabored.  Right upper extremity: Incision is open to air, clean, dry, and intact. No erythema. No drainage present. Sens intact Ax/Musc/Med/Rad/Uln nerves with some diminished sensitivity over the ring and small fingers. Motor intact EPL, FPL,  strength and Intrinsics.     Medications:     Current Outpatient Medications:     acetaminophen (TYLENOL) 325 MG tablet, Take 325-650 mg by mouth every 6 hours as needed, Disp: , Rfl:     aspirin (ASA) 81 MG EC tablet, Take 1 tablet (81 mg) by mouth daily, Disp: 90 tablet, Rfl: 3    cephALEXin (KEFLEX) 500 MG capsule, Take 1 capsule (500 mg) by mouth 4 times daily., Disp: 12 capsule, Rfl: 0    Cholecalciferol (VITAMIN D3) 25 MCG (1000 UT) CAPS, Take 2,000 Units by mouth., Disp: , Rfl:     fexofenadine (ALLEGRA) 180 MG tablet, Take  by mouth. 1 TABLET DAILY FOR ALLERGIES, Disp: 90 tablet, Rfl: 0    GLUCOSAMINE-CHONDROITIN PO, , Disp: , Rfl:     hydroxychloroquine (PLAQUENIL) 200 MG tablet, Take 1 tablet (200 mg) by mouth 2 times daily. Annual Plaquenil toxicity eye screening required., Disp: 180 tablet, Rfl: 3    magnesium oxide 400 MG CAPS, Take by mouth daily, Disp: , Rfl:     Misc  Natural Products (OSTEO BI-FLEX JOINT SHIELD PO), Take by mouth daily., Disp: , Rfl:     Multiple Vitamin (MULTIVITAMINS PO), , Disp: , Rfl:     Omega-3 Fatty Acids (OMEGA-3 FISH OIL PO), , Disp: , Rfl:     ondansetron (ZOFRAN) 4 MG tablet, Take 1 tablet (4 mg) by mouth every 6 hours as needed for nausea., Disp: 12 tablet, Rfl: 0    propranolol ER (INDERAL LA) 60 MG 24 hr capsule, Take 1 capsule (60 mg) by mouth daily., Disp: , Rfl:     Propylene Glycol (SYSTANE COMPLETE PF OP), Apply 1 drop to eye 2 times daily., Disp: , Rfl:     riboflavin 100 MG CAPS, Take 200 mg by mouth daily., Disp: , Rfl:     rizatriptan (MAXALT-MLT) 10 MG ODT, Dissolve 1 tablet (10 mg) in mouth as needed. May repeat after two hours.  Maximum dose 30mg/24 hours., Disp: , Rfl:     SUMAtriptan (IMITREX) 100 MG tablet, Take 100 mg by mouth at onset of headache for migraine., Disp: , Rfl:     Assessment:  Doing well 2 weeks s/p above surgery.     Plan:   -Weight bearing: May progress weightbearing 10-15 pounds over the next 2 weeks, then 20-25 pounds the following 2 weeks.   -Incision care reviewed. She may shower and pat dry. Glue will continue to flake off with showering. May place compress over glue for 5 minutes to promote loosening.   -Avoid resting on elbow.   -Range of motion as tolerated at the elbow, demonstrated both flexion/extension and pronation/supination  -Follow-up as scheduled in 2 weeks with Dr. Ruma Zuniga, PA-C   Orthopedic Surgery

## 2025-04-29 NOTE — LETTER
4/29/2025      Siri Vyas  [address]  [facility] [address]      Dear Colleague,    Thank you for referring your patient, Siri Vyas, to the [facility]. Please see a copy of my visit note below.    Chief Complaint:   Follow up, DOS 4/16/25 with Dr. Hendrix    Procedures:  Right ulnar nerve decompression at the elbow     History:  Siri Vyas is a 54 year old who presents to clinic for routine follow-up from right elbow surgery. She is overall doing well. She has already noted some improvement in the numbness she's had in her middle, ring and small finger on the right hand. Last Friday, some return of tingling occurred for no specific reason, but has since improved. She has removed her ACE and is out of the sling. She's been working on ROM exercises.     Denies fevers, chills or sweats. Has had swelling in the lateral elbow but not into forearm or wrist.      Exam:    General: Awake, Alert, and oriented. Articulates and communicates with a normal affect  Respirations even and unlabored.  Right upper extremity: Incision is open to air, clean, dry, and intact. No erythema. No drainage present. Sens intact Ax/Musc/Med/Rad/Uln nerves with some diminished sensitivity over the ring and small fingers. Motor intact EPL, FPL,  strength and Intrinsics.     Medications:     Current Outpatient Medications:      acetaminophen (TYLENOL) 325 MG tablet, Take 325-650 mg by mouth every 6 hours as needed, Disp: , Rfl:      aspirin (ASA) 81 MG EC tablet, Take 1 tablet (81 mg) by mouth daily, Disp: 90 tablet, Rfl: 3     cephALEXin (KEFLEX) 500 MG capsule, Take 1 capsule (500 mg) by mouth 4 times daily., Disp: 12 capsule, Rfl: 0     Cholecalciferol (VITAMIN D3) 25 MCG (1000 UT) CAPS, Take 2,000 Units by mouth., Disp: , Rfl:      fexofenadine (ALLEGRA) 180 MG tablet, Take  by mouth. 1 TABLET DAILY FOR ALLERGIES, Disp: 90 tablet, Rfl: 0     GLUCOSAMINE-CHONDROITIN PO, , Disp: , Rfl:       hydroxychloroquine (PLAQUENIL) 200 MG tablet, Take 1 tablet (200 mg) by mouth 2 times daily. Annual Plaquenil toxicity eye screening required., Disp: 180 tablet, Rfl: 3     magnesium oxide 400 MG CAPS, Take by mouth daily, Disp: , Rfl:      Misc Natural Products (OSTEO BI-FLEX JOINT SHIELD PO), Take by mouth daily., Disp: , Rfl:      Multiple Vitamin (MULTIVITAMINS PO), , Disp: , Rfl:      Omega-3 Fatty Acids (OMEGA-3 FISH OIL PO), , Disp: , Rfl:      ondansetron (ZOFRAN) 4 MG tablet, Take 1 tablet (4 mg) by mouth every 6 hours as needed for nausea., Disp: 12 tablet, Rfl: 0     propranolol ER (INDERAL LA) 60 MG 24 hr capsule, Take 1 capsule (60 mg) by mouth daily., Disp: , Rfl:      Propylene Glycol (SYSTANE COMPLETE PF OP), Apply 1 drop to eye 2 times daily., Disp: , Rfl:      riboflavin 100 MG CAPS, Take 200 mg by mouth daily., Disp: , Rfl:      rizatriptan (MAXALT-MLT) 10 MG ODT, Dissolve 1 tablet (10 mg) in mouth as needed. May repeat after two hours.  Maximum dose 30mg/24 hours., Disp: , Rfl:      SUMAtriptan (IMITREX) 100 MG tablet, Take 100 mg by mouth at onset of headache for migraine., Disp: , Rfl:     Assessment:  Doing well 2 weeks s/p above surgery.     Plan:   -Weight bearing: May progress weightbearing 10-15 pounds over the next 2 weeks, then 20-25 pounds the following 2 weeks.   -Incision care reviewed. She may shower and pat dry. Glue will continue to flake off with showering. May place compress over glue for 5 minutes to promote loosening.   -Avoid resting on elbow.   -Range of motion as tolerated at the elbow, demonstrated both flexion/extension and pronation/supination  -Follow-up as scheduled in 2 weeks with Dr. Ruma Zuniga PA-C   Orthopedic Surgery      Again, thank you for allowing me to participate in the care of your patient.        Sincerely,    Brunilda Zuniga PA-C  Electronically signed

## 2025-05-09 ENCOUNTER — OFFICE VISIT (OUTPATIENT)
Dept: ORTHOPEDICS | Facility: CLINIC | Age: 55
End: 2025-05-09
Payer: COMMERCIAL

## 2025-05-09 DIAGNOSIS — G56.22 ULNAR NEUROPATHY AT ELBOW, LEFT: Primary | ICD-10-CM

## 2025-05-09 PROCEDURE — 99024 POSTOP FOLLOW-UP VISIT: CPT | Performed by: STUDENT IN AN ORGANIZED HEALTH CARE EDUCATION/TRAINING PROGRAM

## 2025-05-09 NOTE — LETTER
May 9, 2025      Siri Vyas  [address]  [facility] [place] [facility] [address]        To Whom It May Concern:    Siri Vyas was seen in our clinic. She may return to work with the only restriction being limit lifting to approximately 15-20 pounds for an additional 2-3 weeks.  She is cleared for all activities at 6 weeks postoperatively.        Sincere regards,        Nilesh Hendrix MD, PhD, FACS  Staff Hand Surgeon

## 2025-05-09 NOTE — LETTER
5/9/2025      Siri Vyas  [address]  [facility] [address]      Dear Colleague,    Thank you for referring your patient, Siri Vyas, to the [facility]. Please see a copy of my visit note below.    Ortho Hand    Doing well.  Improved symptoms following surgery.  No issues.    On exam, left elbow incision is well-healed with no wounds or signs of infection.  Left hand intrinsic function remains intact with 5 of 5 strength.  Left ulnar distributed sensation is normal.    A/P: 54-year-old female 4 weeks status post left ulnar nerve decompression at the elbow, doing well    - Cleared for all activities, but limit lifting to approximately 20-25 pounds for an additional 2 weeks.  Patient can initiate scar treatment with gentle circular massage or use of silicone sheeting.  After 6 weeks postoperatively, patient can conduct all activities with no restrictions, including soaking or swimming as needed.  Return to clinic if there is any issue.  All questions answered.    Nilesh Hendrix MD, PhD, FACS    Again, thank you for allowing me to participate in the care of your patient.        Sincerely,        Nilesh Hendrix MD    Electronically signed

## 2025-05-09 NOTE — NURSING NOTE
Chief Complaint   Patient presents with    Left Elbow - Surgical Followup, Numbness     Left elbow post op       There were no vitals filed for this visit.    There is no height or weight on file to calculate BMI.      KAUR Jacobs NREMT

## 2025-05-12 NOTE — PROGRESS NOTES
Ortho Hand    Doing well.  Improved symptoms following surgery.  No issues.    On exam, left elbow incision is well-healed with no wounds or signs of infection.  Left hand intrinsic function remains intact with 5 of 5 strength.  Left ulnar distributed sensation is normal.    A/P: 54-year-old female 4 weeks status post left ulnar nerve decompression at the elbow, doing well    - Cleared for all activities, but limit lifting to approximately 20-25 pounds for an additional 2 weeks.  Patient can initiate scar treatment with gentle circular massage or use of silicone sheeting.  After 6 weeks postoperatively, patient can conduct all activities with no restrictions, including soaking or swimming as needed.  Return to clinic if there is any issue.  All questions answered.    Nilesh Hendrix MD, PhD, FACS

## 2025-06-09 ENCOUNTER — PATIENT OUTREACH (OUTPATIENT)
Dept: CARE COORDINATION | Facility: CLINIC | Age: 55
End: 2025-06-09
Payer: COMMERCIAL

## 2025-06-22 ENCOUNTER — HEALTH MAINTENANCE LETTER (OUTPATIENT)
Age: 55
End: 2025-06-22

## 2025-06-23 ENCOUNTER — PATIENT OUTREACH (OUTPATIENT)
Dept: CARE COORDINATION | Facility: CLINIC | Age: 55
End: 2025-06-23
Payer: COMMERCIAL

## 2025-08-20 ENCOUNTER — LAB (OUTPATIENT)
Dept: LAB | Facility: CLINIC | Age: 55
End: 2025-08-20
Payer: COMMERCIAL

## 2025-08-20 DIAGNOSIS — R76.8 SS-A ANTIBODY POSITIVE: ICD-10-CM

## 2025-08-20 DIAGNOSIS — R76.8 POSITIVE ANA (ANTINUCLEAR ANTIBODY): ICD-10-CM

## 2025-08-20 DIAGNOSIS — M35.00 PRIMARY SJOGREN'S SYNDROME: ICD-10-CM

## 2025-08-20 LAB
ALBUMIN SERPL BCG-MCNC: 4.2 G/DL (ref 3.5–5.2)
ALBUMIN UR-MCNC: NEGATIVE MG/DL
ALT SERPL W P-5'-P-CCNC: 34 U/L (ref 0–50)
APPEARANCE UR: CLEAR
AST SERPL W P-5'-P-CCNC: 34 U/L (ref 0–45)
BACTERIA #/AREA URNS HPF: ABNORMAL /HPF
BILIRUB UR QL STRIP: NEGATIVE
COLOR UR AUTO: COLORLESS
CREAT SERPL-MCNC: 0.83 MG/DL (ref 0.51–0.95)
CRP SERPL-MCNC: 4.91 MG/L
EGFRCR SERPLBLD CKD-EPI 2021: 83 ML/MIN/1.73M2
ERYTHROCYTE [DISTWIDTH] IN BLOOD BY AUTOMATED COUNT: 12.4 % (ref 10–15)
ERYTHROCYTE [SEDIMENTATION RATE] IN BLOOD BY WESTERGREN METHOD: 9 MM/HR (ref 0–30)
GLUCOSE UR STRIP-MCNC: NEGATIVE MG/DL
HCT VFR BLD AUTO: 41.6 % (ref 35–47)
HGB BLD-MCNC: 14 G/DL (ref 11.7–15.7)
HGB UR QL STRIP: NEGATIVE
KETONES UR STRIP-MCNC: NEGATIVE MG/DL
LEUKOCYTE ESTERASE UR QL STRIP: NEGATIVE
MCH RBC QN AUTO: 29.5 PG (ref 26.5–33)
MCHC RBC AUTO-ENTMCNC: 33.7 G/DL (ref 31.5–36.5)
MCV RBC AUTO: 87.8 FL (ref 78–100)
NITRATE UR QL: NEGATIVE
PH UR STRIP: 6.5 [PH] (ref 5–7)
PLATELET # BLD AUTO: 284 10E3/UL (ref 150–450)
RBC # BLD AUTO: 4.74 10E6/UL (ref 3.8–5.2)
RBC #/AREA URNS AUTO: ABNORMAL /HPF
SP GR UR STRIP: 1.01 (ref 1–1.03)
SQUAMOUS #/AREA URNS AUTO: ABNORMAL /LPF
UROBILINOGEN UR STRIP-MCNC: NORMAL MG/DL
WBC # BLD AUTO: 8.81 10E3/UL (ref 4–11)
WBC #/AREA URNS AUTO: ABNORMAL /HPF

## 2025-08-20 PROCEDURE — 82040 ASSAY OF SERUM ALBUMIN: CPT

## 2025-08-20 PROCEDURE — 84450 TRANSFERASE (AST) (SGOT): CPT

## 2025-08-20 PROCEDURE — 85027 COMPLETE CBC AUTOMATED: CPT

## 2025-08-20 PROCEDURE — 36415 COLL VENOUS BLD VENIPUNCTURE: CPT

## 2025-08-20 PROCEDURE — 86140 C-REACTIVE PROTEIN: CPT

## 2025-08-20 PROCEDURE — 81001 URINALYSIS AUTO W/SCOPE: CPT

## 2025-08-20 PROCEDURE — 85652 RBC SED RATE AUTOMATED: CPT

## 2025-08-20 PROCEDURE — 82565 ASSAY OF CREATININE: CPT

## 2025-08-20 PROCEDURE — 84460 ALANINE AMINO (ALT) (SGPT): CPT

## 2025-08-24 ENCOUNTER — HEALTH MAINTENANCE LETTER (OUTPATIENT)
Age: 55
End: 2025-08-24

## 2025-08-27 ENCOUNTER — OFFICE VISIT (OUTPATIENT)
Dept: RHEUMATOLOGY | Facility: CLINIC | Age: 55
End: 2025-08-27
Payer: COMMERCIAL

## 2025-08-27 VITALS
OXYGEN SATURATION: 97 % | WEIGHT: 221 LBS | HEART RATE: 82 BPM | DIASTOLIC BLOOD PRESSURE: 71 MMHG | SYSTOLIC BLOOD PRESSURE: 109 MMHG | BODY MASS INDEX: 39.16 KG/M2

## 2025-08-27 DIAGNOSIS — Z79.899 LONG-TERM USE OF PLAQUENIL: ICD-10-CM

## 2025-08-27 DIAGNOSIS — M35.00 PRIMARY SJOGREN'S SYNDROME: Primary | ICD-10-CM

## 2025-08-27 DIAGNOSIS — M17.0 PRIMARY OSTEOARTHRITIS OF BOTH KNEES: ICD-10-CM

## 2025-08-27 RX ORDER — PILOCARPINE HYDROCHLORIDE 5 MG/1
5 TABLET, FILM COATED ORAL 3 TIMES DAILY
Qty: 270 TABLET | Refills: 0 | Status: SHIPPED | OUTPATIENT
Start: 2025-08-27

## 2025-08-27 ASSESSMENT — PAIN SCALES - GENERAL: PAINLEVEL_OUTOF10: NO PAIN (0)

## (undated) DEVICE — TUBING EXTENSION SET STD BORE 6" LL

## (undated) DEVICE — TRAY PAIN INJECTION 97A 640

## (undated) DEVICE — BNDG ELASTIC 3"X5YDS UNSTERILE 6611-30

## (undated) DEVICE — CAST PADDING 4" STERILE 9044S

## (undated) DEVICE — SOL NACL 0.9% IRRIG 500ML BOTTLE 2F7123

## (undated) DEVICE — GLOVE PROTEXIS POWDER FREE SMT 7.0  2D72PT70X

## (undated) DEVICE — PREP CHLORAPREP W/ORANGE TINT 10.5ML 930715

## (undated) DEVICE — TOURNIQUET SGL BLADDER 18"X4" RED 5921-218-135

## (undated) DEVICE — SU DERMABOND DHV12

## (undated) DEVICE — SU VICRYL+ 3-0 27IN SH UND VCP416H

## (undated) DEVICE — DRSG PRIMAPORE 03 1/8X6" 66000318

## (undated) DEVICE — CAST PADDING 2" COTTON SPECIALIST 100 UNSTERILE 7052

## (undated) DEVICE — SU MONOCRYL 3-0 PS-2 18" UND Y497G

## (undated) DEVICE — GLOVE BIOGEL PI ULTRATOUCH G SZ 7.0 42170

## (undated) DEVICE — DRSG STERI STRIP 1/2X4" R1547

## (undated) DEVICE — NDL SPINAL 25GA 3.5" QUINCKE 405180

## (undated) DEVICE — LINEN ORTHO PACK 5446

## (undated) DEVICE — SOL WATER IRRIG 500ML BOTTLE 2F7113

## (undated) DEVICE — DRAPE STERI TOWEL LG 1010

## (undated) DEVICE — PREP CHLORAPREP 26ML TINTED HI-LITE ORANGE 930815

## (undated) DEVICE — CAST PADDING 3" STERILE 9043S

## (undated) DEVICE — SUCTION MANIFOLD NEPTUNE 2 SYS 1 PORT 702-025-000

## (undated) DEVICE — DRSG KERLIX FLUFFS X5

## (undated) DEVICE — GLOVE BIOGEL PI MICRO SZ 7.5 48575

## (undated) DEVICE — SLING ARM LG 0814-0064

## (undated) DEVICE — PACK HAND CUSTOM ASC SOP15HPU15

## (undated) DEVICE — BNDG ELASTIC 2"X5YDS UNSTERILE 6611-20

## (undated) RX ORDER — ONDANSETRON 2 MG/ML
INJECTION INTRAMUSCULAR; INTRAVENOUS
Status: DISPENSED
Start: 2025-04-16

## (undated) RX ORDER — CEFAZOLIN SODIUM 2 G/50ML
SOLUTION INTRAVENOUS
Status: DISPENSED
Start: 2025-04-16

## (undated) RX ORDER — ACETAMINOPHEN 325 MG/1
TABLET ORAL
Status: DISPENSED
Start: 2025-04-16

## (undated) RX ORDER — PROPOFOL 10 MG/ML
INJECTION, EMULSION INTRAVENOUS
Status: DISPENSED
Start: 2025-04-16

## (undated) RX ORDER — FENTANYL CITRATE 50 UG/ML
INJECTION, SOLUTION INTRAMUSCULAR; INTRAVENOUS
Status: DISPENSED
Start: 2025-04-16

## (undated) RX ORDER — KETOROLAC TROMETHAMINE 30 MG/ML
INJECTION, SOLUTION INTRAMUSCULAR; INTRAVENOUS
Status: DISPENSED
Start: 2024-09-18

## (undated) RX ORDER — DIPHENHYDRAMINE HYDROCHLORIDE 50 MG/ML
INJECTION INTRAMUSCULAR; INTRAVENOUS
Status: DISPENSED
Start: 2024-09-18

## (undated) RX ORDER — ONDANSETRON 2 MG/ML
INJECTION INTRAMUSCULAR; INTRAVENOUS
Status: DISPENSED
Start: 2024-09-18

## (undated) RX ORDER — FENTANYL CITRATE 50 UG/ML
INJECTION, SOLUTION INTRAMUSCULAR; INTRAVENOUS
Status: DISPENSED
Start: 2024-09-18

## (undated) RX ORDER — KETOROLAC TROMETHAMINE 30 MG/ML
INJECTION, SOLUTION INTRAMUSCULAR; INTRAVENOUS
Status: DISPENSED
Start: 2025-04-16